# Patient Record
Sex: FEMALE | Employment: OTHER | URBAN - METROPOLITAN AREA
[De-identification: names, ages, dates, MRNs, and addresses within clinical notes are randomized per-mention and may not be internally consistent; named-entity substitution may affect disease eponyms.]

---

## 2017-01-08 ENCOUNTER — HOSPITAL ENCOUNTER (EMERGENCY)
Age: 82
Discharge: HOME | End: 2017-01-08
Attending: EMERGENCY MEDICINE
Payer: MEDICARE

## 2017-01-08 ENCOUNTER — APPOINTMENT (OUTPATIENT)
Dept: GENERAL RADIOLOGY | Age: 82
End: 2017-01-08
Attending: PHYSICIAN ASSISTANT
Payer: MEDICARE

## 2017-01-08 VITALS
TEMPERATURE: 97.9 F | HEART RATE: 64 BPM | SYSTOLIC BLOOD PRESSURE: 178 MMHG | WEIGHT: 113.76 LBS | BODY MASS INDEX: 21.49 KG/M2 | RESPIRATION RATE: 16 BRPM | OXYGEN SATURATION: 100 % | DIASTOLIC BLOOD PRESSURE: 93 MMHG

## 2017-01-08 DIAGNOSIS — M79.672 LEFT FOOT PAIN: Primary | ICD-10-CM

## 2017-01-08 PROCEDURE — 73630 X-RAY EXAM OF FOOT: CPT

## 2017-01-08 PROCEDURE — 77030036687 HC SHOE PSTOP S2SG -A

## 2017-01-08 PROCEDURE — 99283 EMERGENCY DEPT VISIT LOW MDM: CPT

## 2017-01-08 RX ORDER — HYDROCODONE BITARTRATE AND ACETAMINOPHEN 5; 325 MG/1; MG/1
1 TABLET ORAL
Qty: 15 TAB | Refills: 0 | Status: SHIPPED | OUTPATIENT
Start: 2017-01-08 | End: 2017-06-24

## 2017-01-08 NOTE — DISCHARGE INSTRUCTIONS
Foot Pain: Care Instructions  Your Care Instructions  Foot injuries that cause pain and swelling are fairly common. Almost all sports or home repair projects can cause a misstep that ends up as foot pain. Normal wear and tear, especially as you get older, also can cause foot pain. Most minor foot injuries will heal on their own, and home treatment is usually all you need to do. If you have a severe injury, you may need tests and treatment. Follow-up care is a key part of your treatment and safety. Be sure to make and go to all appointments, and call your doctor if you are having problems. Its also a good idea to know your test results and keep a list of the medicines you take. How can you care for yourself at home? · Take pain medicines exactly as directed. ¨ If the doctor gave you a prescription medicine for pain, take it as prescribed. ¨ If you are not taking a prescription pain medicine, ask your doctor if you can take an over-the-counter medicine. · Rest and protect your foot. Take a break from any activity that may cause pain. · Put ice or a cold pack on your foot for 10 to 20 minutes at a time. Put a thin cloth between the ice and your skin. · Prop up the sore foot on a pillow when you ice it or anytime you sit or lie down during the next 3 days. Try to keep it above the level of your heart. This will help reduce swelling. · Your doctor may recommend that you wrap your foot with an elastic bandage. Keep your foot wrapped for as long as your doctor advises. · If your doctor recommends crutches, use them as directed. · Wear roomy footwear. · As soon as pain and swelling end, begin gentle exercises of your foot. Your doctor can tell you which exercises will help. When should you call for help? Call 911 anytime you think you may need emergency care. For example, call if:  · Your foot turns pale, white, blue, or cold.   Call your doctor now or seek immediate medical care if:  · You cannot move or stand on your foot. · Your foot looks twisted or out of its normal position. · Your foot is not stable when you step down. · You have signs of infection, such as:  ¨ Increased pain, swelling, warmth, or redness. ¨ Red streaks leading from the sore area. ¨ Pus draining from a place on your foot. ¨ A fever. · Your foot is numb or tingly. Watch closely for changes in your health, and be sure to contact your doctor if:  · You do not get better as expected. · You have bruises from an injury that last longer than 2 weeks. Where can you learn more? Go to http://graeme-vlad.info/. Enter T764 in the search box to learn more about \"Foot Pain: Care Instructions. \"  Current as of: May 23, 2016  Content Version: 11.1  © 0061-5262 [employer]. Care instructions adapted under license by [employer] (which disclaims liability or warranty for this information). If you have questions about a medical condition or this instruction, always ask your healthcare professional. [place] [address] any warranty or liability for your use of this information. Foot Pain: Care Instructions  Your Care Instructions  Foot injuries that cause pain and swelling are fairly common. Almost all sports or home repair projects can cause a misstep that ends up as foot pain. Normal wear and tear, especially as you get older, also can cause foot pain. Most minor foot injuries will heal on their own, and home treatment is usually all you need to do. If you have a severe injury, you may need tests and treatment. Follow-up care is a key part of your treatment and safety. Be sure to make and go to all appointments, and call your doctor if you are having problems. Its also a good idea to know your test results and keep a list of the medicines you take. How can you care for yourself at home? · Take pain medicines exactly as directed.   ¨ If the doctor gave you a prescription medicine for pain, take it as prescribed. ¨ If you are not taking a prescription pain medicine, ask your doctor if you can take an over-the-counter medicine. · Rest and protect your foot. Take a break from any activity that may cause pain. · Put ice or a cold pack on your foot for 10 to 20 minutes at a time. Put a thin cloth between the ice and your skin. · Prop up the sore foot on a pillow when you ice it or anytime you sit or lie down during the next 3 days. Try to keep it above the level of your heart. This will help reduce swelling. · Your doctor may recommend that you wrap your foot with an elastic bandage. Keep your foot wrapped for as long as your doctor advises. · If your doctor recommends crutches, use them as directed. · Wear roomy footwear. · As soon as pain and swelling end, begin gentle exercises of your foot. Your doctor can tell you which exercises will help. When should you call for help? Call 911 anytime you think you may need emergency care. For example, call if:  · Your foot turns pale, white, blue, or cold. Call your doctor now or seek immediate medical care if:  · You cannot move or stand on your foot. · Your foot looks twisted or out of its normal position. · Your foot is not stable when you step down. · You have signs of infection, such as:  ¨ Increased pain, swelling, warmth, or redness. ¨ Red streaks leading from the sore area. ¨ Pus draining from a place on your foot. ¨ A fever. · Your foot is numb or tingly. Watch closely for changes in your health, and be sure to contact your doctor if:  · You do not get better as expected. · You have bruises from an injury that last longer than 2 weeks. Where can you learn more? Go to http://graeme-vlad.info/. Enter C379 in the search box to learn more about \"Foot Pain: Care Instructions. \"  Current as of: May 23, 2016  Content Version: 11.1  © 5428-4483 [employer].  Care instructions adapted under license by [address] (which disclaims liability or warranty for this information). If you have questions about a medical condition or this instruction, always ask your healthcare professional. [address] any warranty or liability for your use of this information.

## 2017-01-08 NOTE — ED TRIAGE NOTES
When she goes to stand up since the wood fell on the foot she has a pain that goes down entire left leg and then she is able to walk.

## 2017-01-08 NOTE — ED PROVIDER NOTES
HPI Comments: Feroz Richardson is a 80 y.o. female with PMHx significant for mild aortic root dilatation, HTN, CAD, hypothyroidism, arthritis, and GERD who presents ambulatory to the ED c/o constant, 5-10/10 L foot pain x 1 week after \"dropping a piece of wood on her foot. \" Pt reports that her pain is exacerbated with weight bearing actions and movement, and does not state any relieving factors. Pt reports that she is otherwise healthy. Pt specifically denies fever, SOB, nausea, and chest pain. PCP: Faye Portillo MD  Social Hx: - tobacco usage, - EtOH, - Illicit drugs      There are no other complaints, changes or physical findings at this time. This note is prepared by Malvin Ziegler, acting as Scribe for [employer] The history is provided by the patient. No  was used. Past Medical History:   Diagnosis Date    Aortic root dilatation (HCC)      mild    Arthritis      hands    CAD (coronary artery disease) 2001     started seeing Cardiologist 10 years ago for low pulse    Dyslipidemia     GERD (gastroesophageal reflux disease)      does not take anything other than Tums    HTN (hypertension)     Hypothyroidism     Thyroid disease        No past surgical history on file. No family history on file. Social History     Social History    Marital status:      Spouse name: N/A    Number of children: N/A    Years of education: N/A     Occupational History    Not on file. Social History Main Topics    Smoking status: Never Smoker    Smokeless tobacco: Not on file    Alcohol use No    Drug use: No    Sexual activity: Not on file     Other Topics Concern    Not on file     Social History Narrative    No narrative on file         ALLERGIES: Antibiotic [lhwla-dpamt-lceycmv-pramoxine]    Review of Systems   Constitutional: Negative for activity change, appetite change, chills, fever and unexpected weight change.    HENT: Negative for congestion. Eyes: Negative for pain and visual disturbance. Respiratory: Negative for cough and shortness of breath. Cardiovascular: Negative for chest pain. Gastrointestinal: Negative for abdominal pain, diarrhea, nausea and vomiting. Genitourinary: Negative for dysuria. Musculoskeletal: Positive for myalgias (L foot). Negative for back pain. Skin: Negative for rash. Neurological: Negative for headaches. Patient Vitals for the past 12 hrs:   Temp Pulse Resp BP SpO2   01/08/17 1049 - - - (!) 178/93 -   01/08/17 1046 97.9 °F (36.6 °C) 64 16 (!) 200/93 100 %           Physical Exam   Constitutional: She is oriented to person, place, and time. She appears well-developed and well-nourished. No distress. HENT:   Head: Normocephalic and atraumatic. Right Ear: External ear normal.   Left Ear: External ear normal.   Nose: Nose normal.   Mouth/Throat: Oropharynx is clear and moist. No oropharyngeal exudate. Eyes: Conjunctivae and EOM are normal. Pupils are equal, round, and reactive to light. Right eye exhibits no discharge. Left eye exhibits no discharge. No scleral icterus. Neck: Normal range of motion. Neck supple. No JVD present. No tracheal deviation present. Cardiovascular: Normal rate, regular rhythm, normal heart sounds and intact distal pulses. Exam reveals no gallop and no friction rub. No murmur heard. Pulmonary/Chest: Effort normal and breath sounds normal. No respiratory distress. She has no wheezes. She has no rales. She exhibits no tenderness. Abdominal: Soft. Bowel sounds are normal. She exhibits no distension and no mass. There is no tenderness. There is no rebound and no guarding. Musculoskeletal:   L Foot: slight swelling and tenderness on the dorsal aspect, decreased APROM secondary to pain. Lymphadenopathy:     She has no cervical adenopathy. Neurological: She is alert and oriented to person, place, and time. She has normal reflexes.  No cranial nerve deficit. She exhibits normal muscle tone. Coordination normal.   NVI. Skin: Skin is warm and dry. She is not diaphoretic. Psychiatric: She has a normal mood and affect. Her behavior is normal. Judgment and thought content normal.   Nursing note and vitals reviewed. MDM  Number of Diagnoses or Management Options  Diagnosis management comments:     DDx: sprain, strain, fracture       Amount and/or Complexity of Data Reviewed  Tests in the radiology section of CPT®: ordered and reviewed  Review and summarize past medical records: yes  Independent visualization of images, tracings, or specimens: yes    Patient Progress  Patient progress: stable    ED Course       Procedures       PROGRESS NOTE  11:14 AM  Post-op shoe was placed on pt's L foot. This note is prepared by Carolyn Leonardo, acting as Scribe for [employer] LABORATORY TESTS:  No results found for this or any previous visit (from the past 12 hour(s)). IMAGING RESULTS:  XR FOOT LT MIN 3 V   Final Result   EXAM: XR FOOT LT MIN 3 V     INDICATION: pain after dropping wood on foot.     COMPARISON: None.     FINDINGS: Three views of the left foot. The bones are osteopenic. No acute  fracture or dislocation. There is a type II os navicularis. No significant  arthritis. The soft tissues appear unremarkable.     IMPRESSION  IMPRESSION: No acute abnormality. MEDICATIONS GIVEN:  Medications - No data to display    IMPRESSION:  1. Left foot pain        PLAN:  1. Current Discharge Medication List      START taking these medications    Details   HYDROcodone-acetaminophen (NORCO) 5-325 mg per tablet Take 1 Tab by mouth every six (6) hours as needed for Pain. Max Daily Amount: 4 Tabs. Qty: 15 Tab, Refills: 0         CONTINUE these medications which have NOT CHANGED    Details   atorvastatin (LIPITOR) 20 mg tablet Take 1 Tab by mouth daily.   Qty: 90 Tab, Refills: 3    Associated Diagnoses: Dyslipidemia      multivitamins-minerals-lutein (CENTRUM SILVER) Tab Take 1 Tab by mouth. brimonidine (ALPHAGAN) 0.15 % ophthalmic solution Administer 1 Drop to both eyes three (3) times daily. Associated Diagnoses: Essential hypertension, benign      aspirin 81 mg tablet Take  by mouth daily. lisinopril-hydrochlorothiazide (PRINZIDE, ZESTORETIC) 20-25 mg per tablet Take  by mouth daily. levothyroxine (SYNTHROID) 125 mcg tablet Take  by mouth daily (before breakfast). Calcium Carbonate-Vit D3-Min (CALTRATE 600+D PLUS MINERALS) 600 mg calcium- 400 unit Tab Take 2 Tabs by mouth daily. LATANOPROST (XALATAN OP) Apply 1 Drop to eye daily. 2.   Follow-up Information     Follow up With Details Comments Contact Info    Katie Ortega DPM In 2 days As needed [address] [facility]  [phone]          Return to ED if worse     DISCHARGE NOTE  11:28 AM  The patient has been re-evaluated and is ready for discharge. Reviewed available results with patient. Counseled pt on diagnosis and care plan. Pt has expressed understanding, and all questions have been answered. Pt agrees with plan and agrees to F/U as recommended, or return to the ED if their sxs worsen. Discharge instructions have been provided and explained to the pt, along with reasons to return to the ED. Written by Carolyn Leonardo, ED Scribe, as dictated by Jarrod Acuna. This note is prepared by Carolyn Leonardo, acting as Scribe for Jarrod Acuna. DERRICK Devries: The scribe's documentation has been prepared under my direction and personally reviewed by me in its entirety. I confirm that the note above accurately reflects all work, treatment, procedures, and medical decision making performed by me.

## 2017-01-08 NOTE — ED NOTES
Patient identified and read over and explained discharge instructions with time for questions by attending MD/PA. Patient has verbalized understanding of discharge instructions.

## 2017-02-16 ENCOUNTER — APPOINTMENT (OUTPATIENT)
Dept: GENERAL RADIOLOGY | Age: 82
DRG: 919 | End: 2017-02-16
Attending: EMERGENCY MEDICINE
Payer: MEDICARE

## 2017-02-16 ENCOUNTER — HOSPITAL ENCOUNTER (INPATIENT)
Age: 82
LOS: 3 days | Discharge: HOME | DRG: 919 | End: 2017-02-20
Attending: EMERGENCY MEDICINE | Admitting: INTERNAL MEDICINE
Payer: MEDICARE

## 2017-02-16 DIAGNOSIS — D62 ACUTE BLOOD LOSS ANEMIA: Primary | ICD-10-CM

## 2017-02-16 DIAGNOSIS — R42 DIZZINESS: ICD-10-CM

## 2017-02-16 DIAGNOSIS — K92.2 GASTROINTESTINAL HEMORRHAGE, UNSPECIFIED GASTROINTESTINAL HEMORRHAGE TYPE: ICD-10-CM

## 2017-02-16 LAB
ALBUMIN SERPL BCP-MCNC: 3 G/DL (ref 3.5–5)
ALBUMIN/GLOB SERPL: 1.1 {RATIO} (ref 1.1–2.2)
ALP SERPL-CCNC: 64 U/L (ref 45–117)
ALT SERPL-CCNC: 16 U/L (ref 12–78)
ANION GAP BLD CALC-SCNC: 10 MMOL/L (ref 5–15)
APPEARANCE UR: CLEAR
AST SERPL W P-5'-P-CCNC: 14 U/L (ref 15–37)
BACTERIA URNS QL MICRO: ABNORMAL /HPF
BASOPHILS # BLD AUTO: 0 K/UL (ref 0–0.1)
BASOPHILS # BLD: 0 % (ref 0–1)
BILIRUB SERPL-MCNC: 0.3 MG/DL (ref 0.2–1)
BILIRUB UR QL: NEGATIVE
BUN SERPL-MCNC: 28 MG/DL (ref 6–20)
BUN/CREAT SERPL: 43 (ref 12–20)
CALCIUM SERPL-MCNC: 7.8 MG/DL (ref 8.5–10.1)
CHLORIDE SERPL-SCNC: 109 MMOL/L (ref 97–108)
CK SERPL-CCNC: 34 U/L (ref 26–192)
CO2 SERPL-SCNC: 24 MMOL/L (ref 21–32)
COLOR UR: ABNORMAL
CREAT SERPL-MCNC: 0.65 MG/DL (ref 0.55–1.02)
EOSINOPHIL # BLD: 0.1 K/UL (ref 0–0.4)
EOSINOPHIL NFR BLD: 1 % (ref 0–7)
EPITH CASTS URNS QL MICRO: ABNORMAL /LPF
ERYTHROCYTE [DISTWIDTH] IN BLOOD BY AUTOMATED COUNT: 14.4 % (ref 11.5–14.5)
GLOBULIN SER CALC-MCNC: 2.7 G/DL (ref 2–4)
GLUCOSE SERPL-MCNC: 110 MG/DL (ref 65–100)
GLUCOSE UR STRIP.AUTO-MCNC: NEGATIVE MG/DL
HCT VFR BLD AUTO: 27 % (ref 35–47)
HGB BLD-MCNC: 9.2 G/DL (ref 11.5–16)
HGB UR QL STRIP: ABNORMAL
KETONES UR QL STRIP.AUTO: NEGATIVE MG/DL
LEUKOCYTE ESTERASE UR QL STRIP.AUTO: ABNORMAL
LYMPHOCYTES # BLD AUTO: 19 % (ref 12–49)
LYMPHOCYTES # BLD: 1.6 K/UL (ref 0.8–3.5)
MCH RBC QN AUTO: 28.7 PG (ref 26–34)
MCHC RBC AUTO-ENTMCNC: 34.1 G/DL (ref 30–36.5)
MCV RBC AUTO: 84.1 FL (ref 80–99)
MONOCYTES # BLD: 0.6 K/UL (ref 0–1)
MONOCYTES NFR BLD AUTO: 7 % (ref 5–13)
NEUTS SEG # BLD: 6.2 K/UL (ref 1.8–8)
NEUTS SEG NFR BLD AUTO: 73 % (ref 32–75)
NITRITE UR QL STRIP.AUTO: NEGATIVE
PH UR STRIP: 5.5 [PH] (ref 5–8)
PLATELET # BLD AUTO: 123 K/UL (ref 150–400)
POTASSIUM SERPL-SCNC: 3.6 MMOL/L (ref 3.5–5.1)
PROT SERPL-MCNC: 5.7 G/DL (ref 6.4–8.2)
PROT UR STRIP-MCNC: NEGATIVE MG/DL
RBC # BLD AUTO: 3.21 M/UL (ref 3.8–5.2)
RBC #/AREA URNS HPF: ABNORMAL /HPF (ref 0–5)
SODIUM SERPL-SCNC: 143 MMOL/L (ref 136–145)
SP GR UR REFRACTOMETRY: 1.02 (ref 1–1.03)
TROPONIN I SERPL-MCNC: <0.04 NG/ML
UA: UC IF INDICATED,UAUC: ABNORMAL
UROBILINOGEN UR QL STRIP.AUTO: 0.2 EU/DL (ref 0.2–1)
WBC # BLD AUTO: 8.4 K/UL (ref 3.6–11)
WBC URNS QL MICRO: ABNORMAL /HPF (ref 0–4)

## 2017-02-16 PROCEDURE — 86900 BLOOD TYPING SEROLOGIC ABO: CPT | Performed by: EMERGENCY MEDICINE

## 2017-02-16 PROCEDURE — 80053 COMPREHEN METABOLIC PANEL: CPT | Performed by: EMERGENCY MEDICINE

## 2017-02-16 PROCEDURE — 81001 URINALYSIS AUTO W/SCOPE: CPT | Performed by: EMERGENCY MEDICINE

## 2017-02-16 PROCEDURE — 86921 COMPATIBILITY TEST INCUBATE: CPT | Performed by: EMERGENCY MEDICINE

## 2017-02-16 PROCEDURE — 36415 COLL VENOUS BLD VENIPUNCTURE: CPT | Performed by: EMERGENCY MEDICINE

## 2017-02-16 PROCEDURE — 86922 COMPATIBILITY TEST ANTIGLOB: CPT | Performed by: EMERGENCY MEDICINE

## 2017-02-16 PROCEDURE — 71020 XR CHEST PA LAT: CPT

## 2017-02-16 PROCEDURE — 82550 ASSAY OF CK (CPK): CPT | Performed by: EMERGENCY MEDICINE

## 2017-02-16 PROCEDURE — 86920 COMPATIBILITY TEST SPIN: CPT | Performed by: EMERGENCY MEDICINE

## 2017-02-16 PROCEDURE — 96374 THER/PROPH/DIAG INJ IV PUSH: CPT

## 2017-02-16 PROCEDURE — 87086 URINE CULTURE/COLONY COUNT: CPT | Performed by: EMERGENCY MEDICINE

## 2017-02-16 PROCEDURE — 85025 COMPLETE CBC W/AUTO DIFF WBC: CPT | Performed by: EMERGENCY MEDICINE

## 2017-02-16 PROCEDURE — 99285 EMERGENCY DEPT VISIT HI MDM: CPT

## 2017-02-16 PROCEDURE — 93005 ELECTROCARDIOGRAM TRACING: CPT

## 2017-02-16 PROCEDURE — 96361 HYDRATE IV INFUSION ADD-ON: CPT

## 2017-02-16 PROCEDURE — 84484 ASSAY OF TROPONIN QUANT: CPT | Performed by: EMERGENCY MEDICINE

## 2017-02-16 PROCEDURE — 74011250636 HC RX REV CODE- 250/636: Performed by: EMERGENCY MEDICINE

## 2017-02-16 PROCEDURE — 94761 N-INVAS EAR/PLS OXIMETRY MLT: CPT

## 2017-02-16 PROCEDURE — 86870 RBC ANTIBODY IDENTIFICATION: CPT | Performed by: EMERGENCY MEDICINE

## 2017-02-16 RX ORDER — SODIUM CHLORIDE 0.9 % (FLUSH) 0.9 %
5-10 SYRINGE (ML) INJECTION AS NEEDED
Status: DISCONTINUED | OUTPATIENT
Start: 2017-02-16 | End: 2017-02-17 | Stop reason: SDUPTHER

## 2017-02-16 RX ORDER — SODIUM CHLORIDE 0.9 % (FLUSH) 0.9 %
5-10 SYRINGE (ML) INJECTION EVERY 8 HOURS
Status: DISCONTINUED | OUTPATIENT
Start: 2017-02-16 | End: 2017-02-20 | Stop reason: HOSPADM

## 2017-02-16 RX ADMIN — SODIUM CHLORIDE 500 ML: 900 INJECTION, SOLUTION INTRAVENOUS at 20:21

## 2017-02-16 NOTE — ED NOTES
Pt requests to use the restroom. Family offers to assist pt. Pt family given urine hat and specimen cup.

## 2017-02-16 NOTE — IP AVS SNAPSHOT
[address] [facility] 
[phone] Patient: Elia Syed MRN: [id number] :1934 You are allergic to the following Allergen Reactions Antibiotic (Znxme-Segjc-Qlnvfls-Pramoxine) Other (comments) Unknown antibiotic begins with a \"C\" Recent Documentation Height Weight BMI OB Status Smoking Status 1.524 m 51.7 kg 22.26 kg/m2 Postmenopausal Never Smoker Unresulted Labs Order Current Status SAMPLE TO BLOOD BANK In process Emergency Contacts Name Discharge Info Relation Home Work Mobile Gaurang Ricklindsey  Child [2] [phone] Leopoldo Crawley  Daughter [21]   [phone] Melinda Townsend  Sister [23]   [phone] About your hospitalization You were admitted on:  2017 You last received care in the:  [facility] You were discharged on:  2017 Why you were hospitalized Your primary diagnosis was:  Not on File Your diagnoses also included:  Upper Gi Bleeding Providers Seen During Your Hospitalizations Provider Role Specialty Primary office phone Joshua Ham DO Attending Provider Emergency Medicine [phone] Eduarda Kohli MD Attending Provider Hospitalist [phone] David Rand MD Attending Provider Internal Medicine [phone] Your Primary Care Physician (PCP) Primary Care Physician Office Phone Office Fax Gaurang Ricklindsey [phone] Follow-up Information Follow up With Details Comments Contact Info Rocael Jean Baptiste MD Schedule an appointment as soon as possible for a visit in 1 week Patient states she will make a follow up appointment when she arrives home. [facility] [address] 
[phone]  Davonte Mahoney MD  Patient states she will make a follow up appointment when she arrives home. [address] 
[phone] Current Discharge Medication List  
  
START taking these medications Dose & Instructions Dispensing Information Comments Morning Noon Evening Bedtime  
 pantoprazole 40 mg tablet Commonly known as:  PROTONIX Your next dose is: Today, Tomorrow Other:  _________ Dose:  40 mg Take 1 Tab by mouth two (2) times a day. Quantity:  60 Tab Refills:  0  
     
   
   
   
  
 sucralfate 1 gram tablet Commonly known as:  Rainell Ruder Your next dose is: Today, Tomorrow Other:  _________ Dose:  1 g Take 1 Tab by mouth four (4) times daily for 14 days. Quantity:  56 Tab Refills:  0 CONTINUE these medications which have NOT CHANGED Dose & Instructions Dispensing Information Comments Morning Noon Evening Bedtime  
 atorvastatin 20 mg tablet Commonly known as:  LIPITOR Your next dose is: Today, Tomorrow Other:  _________ Dose:  20 mg Take 1 Tab by mouth daily. Quantity:  90 Tab Refills:  3  
     
   
   
   
  
 brimonidine 0.15 % ophthalmic solution Commonly known as:  Mounika Georgia Your next dose is: Today, Tomorrow Other:  _________ Dose:  1 Drop Administer 1 Drop to both eyes three (3) times daily. Refills:  0  
     
   
   
   
  
 CALTRATE 600+D PLUS MINERALS 600 mg calcium- 400 unit Tab Generic drug:  Calcium Carbonate-Vit D3-Min Your next dose is: Today, Tomorrow Other:  _________ Dose:  2 Tab Take 2 Tabs by mouth daily. Refills:  0 CENTRUM SILVER Tab tablet Generic drug:  multivitamins-minerals-lutein Your next dose is: Today, Tomorrow Other:  _________ Dose:  1 Tab Take 1 Tab by mouth. Refills:  0 HYDROcodone-acetaminophen 5-325 mg per tablet Commonly known as:  Barnet Cuff Your next dose is: Today, Tomorrow Other:  _________ Dose:  1 Tab Take 1 Tab by mouth every six (6) hours as needed for Pain. Max Daily Amount: 4 Tabs. Quantity:  15 Tab Refills:  0  
     
   
   
   
  
 lisinopril-hydroCHLOROthiazide 20-25 mg per tablet Commonly known as:  Patel Jadiel Your next dose is: Today, Tomorrow Other:  _________ Take  by mouth daily. Refills:  0  
     
   
   
   
  
 SYNTHROID 125 mcg tablet Generic drug:  levothyroxine Your next dose is: Today, Tomorrow Other:  _________ Take  by mouth daily (before breakfast). Refills:  0  
     
   
   
   
  
 XALATAN OP Your next dose is: Today, Tomorrow Other:  _________ Dose:  1 Drop Apply 1 Drop to eye daily. Refills:  0 ASK your doctor about these medications Dose & Instructions Dispensing Information Comments Morning Noon Evening Bedtime  
 aspirin 81 mg tablet Your next dose is: Today, Tomorrow Other:  _________ Take  by mouth daily. Refills:  0 Where to Get Your Medications Information on where to get these meds will be given to you by the nurse or doctor. ! Ask your nurse or doctor about these medications  
  pantoprazole 40 mg tablet  
 sucralfate 1 gram tablet Discharge Instructions HOSPITALIST DISCHARGE INSTRUCTIONS 
 
NAME: [place] :  1934 MRN:  [id number] Date/Time:  2017 9:23 AM 
 
ADMIT DATE: 2017 DISCHARGE DATE: 2017 Attending Physician: Libby Almanza MD 
 
DISCHARGE DIAGNOSIS: 
Upper GI bleeding - stomach ulcer and bleeding from biopsy site Syncope S/p orthostasis Essential HTN Hypothyroidism Glaucoma Thoracic AA MEDICATIONS: 
See above · It is important that you take the medication exactly as they are prescribed. · Keep your medication in the bottles provided by the pharmacist and keep a list of the medication names, dosages, and times to be taken in your wallet. · Do not take other medications without consulting your doctor. Pain Management: per above medications What to do at [facility] Recommended diet:  Cardiac Diet Recommended activity: Activity as tolerated Follow Up: Follow-up Information Follow up With Details Comments Contact Info Claudia Joiner MD Schedule an appointment as soon as possible for a visit in 1 week  [facility] [address] [place] [address] 
[phone] Carlene Eugene MD   [address] [facility] [address] [place] 
[phone] If you have questions regarding the hospital related prescriptions or hospital related issues please call [facility] at . You can always direct your questions to your primary care doctor if you are unable to reach your hospital physician; your PCP works as an extension of your hospital doctor just like your hospital doctor is an extension of your PCP for your time at [facility]. If you experience any of the following symptoms then please call your primary care physician or return to the emergency room if you cannot get hold of your doctor: 
Fever, chills, nausea, vomiting, diarrhea, change in mentation, falling, bleeding, shortness of breath Additional Instructions: 
 
 
Bring these papers with you to your follow up appointments. The papers will help your doctors be sure to continue the care plan from the hospital. 
 
 
 
 
 
 
Information obtained by : 
I understand that if any problems occur once I am at home I am to contact my physician. I understand and acknowledge receipt of the instructions indicated above. Physician's or R.N.'s Signature                                                                  Date/Time Patient or Representative Signature                                                          Da 
 
Discharge Orders None General Information Please provide this summary of care documentation to your next provider. Introducing [facility]! [facility] introduces Smappo patient portal. Now you can access parts of your medical record, email your doctor's office, and request medication refills online. 1. In your internet browser, go to [email or web address] 2. Click on the First Time User? Click Here link in the Sign In box. You will see the New Member Sign Up page. 3. Enter your Smappo Access Code exactly as it appears below. You will not need to use this code after youve completed the sign-up process. If you do not sign up before the expiration date, you must request a new code. · Smappo Access Code: [email or web address] Expires: 4/8/2017 11:13 AM 
 
4. Enter the last four digits of your Social Security Number (xxxx) and Date of Birth (mm/dd/yyyy) as indicated and click Submit. You will be taken to the next sign-up page. 5. Create a Smappo ID. This will be your Smappo login ID and cannot be changed, so think of one that is secure and easy to remember. 6. Create a Smappo password. You can change your password at any time. 7. Enter your Password Reset Question and Answer. This can be used at a later time if you forget your password. 8. Enter your e-mail address. You will receive e-mail notification when new information is available in [address] 9. Click Sign Up. You can now view and download portions of your medical record. 10. Click the Download Summary menu link to download a portable copy of your medical information. If you have questions, please visit the Frequently Asked Questions section of the [employer]ico website. Remember, [employer] is NOT to be used for urgent needs. For medical emergencies, dial 911. Now available from your iPhone and Android! Patient Signature:  ____________________________________________________________ Date:  ____________________________________________________________  
  
Alfredo Boug Provider Signature:  ____________________________________________________________ Date:  ____________________________________________________________

## 2017-02-16 NOTE — IP AVS SNAPSHOT
[address] [facility] 
[phone] Patient: Feroz Richardson MRN: [id number] :1934 You are allergic to the following Allergen Reactions Antibiotic (Xjcxe-Hycux-Ryhtvjv-Pramoxine) Other (comments) Unknown antibiotic begins with a \"C\" Recent Documentation Height Weight BMI OB Status Smoking Status 1.524 m 51.7 kg 22.26 kg/m2 Postmenopausal Never Smoker Unresulted Labs Order Current Status SAMPLE TO BLOOD BANK In process Emergency Contacts Name Discharge Info Relation Home Work Mobile Rajesh Lu  Child [2] [phone] Landry Alcocer  Daughter [21]   [phone] Silvestre Gitelman  Sister [23]   [phone] About your hospitalization You were admitted on:  2017 You last received care in the:  \[facility] [place] [facility]\"" 2 PROGRESSIVE CARE You were discharged on:  2017 Unit phone number:  [phone] Why you were hospitalized Your primary diagnosis was:  Not on File Your diagnoses also included:  Upper Gi Bleeding Providers Seen During Your Hospitalizations Provider Role Specialty Primary office phone Rachael Howard DO Attending Provider Emergency Medicine [phone] Yudith Garrison MD Attending Provider Hospitalist [phone] Libby Almanza MD Attending Provider Internal Medicine [phone] Your Primary Care Physician (PCP) Primary Care Physician Office Phone Office Fax Rajesh Lu [phone] Follow-up Information Follow up With Details Comments Contact Info Steve Avina MD Schedule an appointment as soon as possible for a visit in 1 week Patient states she will make a follow up appointment when she arrives home. [facility] [address] [place] [address] 
[phone] Wandy Scott MD  Patient states she will make a follow up appointment when she arrives home. [address] 
[phone] Current Discharge Medication List  
  
START taking these medications Dose & Instructions Dispensing Information Comments Morning Noon Evening Bedtime  
 pantoprazole 40 mg tablet Commonly known as:  PROTONIX Your next dose is: Today, Tomorrow Other:  _________ Dose:  40 mg Take 1 Tab by mouth two (2) times a day. Quantity:  60 Tab Refills:  0  
     
   
   
   
  
 sucralfate 1 gram tablet Commonly known as:  Kidder Stalling Your next dose is: Today, Tomorrow Other:  _________ Dose:  1 g Take 1 Tab by mouth four (4) times daily for 14 days. Quantity:  56 Tab Refills:  0 CONTINUE these medications which have NOT CHANGED Dose & Instructions Dispensing Information Comments Morning Noon Evening Bedtime  
 atorvastatin 20 mg tablet Commonly known as:  LIPITOR Your next dose is: Today, Tomorrow Other:  _________ Dose:  20 mg Take 1 Tab by mouth daily. Quantity:  90 Tab Refills:  3  
     
   
   
   
  
 brimonidine 0.15 % ophthalmic solution Commonly known as:  Fiore Sloop Your next dose is: Today, Tomorrow Other:  _________ Dose:  1 Drop Administer 1 Drop to both eyes three (3) times daily. Refills:  0  
     
   
   
   
  
 CALTRATE 600+D PLUS MINERALS 600 mg calcium- 400 unit Tab Generic drug:  Calcium Carbonate-Vit D3-Min Your next dose is: Today, Tomorrow Other:  _________ Dose:  2 Tab Take 2 Tabs by mouth daily. Refills:  0 CENTRUM SILVER Tab tablet Generic drug:  multivitamins-minerals-lutein Your next dose is: Today, Tomorrow Other:  _________ Dose:  1 Tab Take 1 Tab by mouth. Refills:  0 HYDROcodone-acetaminophen 5-325 mg per tablet Commonly known as:  Benetta Pock Your next dose is: Today, Tomorrow Other:  _________ Dose:  1 Tab Take 1 Tab by mouth every six (6) hours as needed for Pain. Max Daily Amount: 4 Tabs. Quantity:  15 Tab Refills:  0  
     
   
   
   
  
 lisinopril-hydroCHLOROthiazide 20-25 mg per tablet Commonly known as:  Donnamarie Parrot Your next dose is: Today, Tomorrow Other:  _________ Take  by mouth daily. Refills:  0  
     
   
   
   
  
 SYNTHROID 125 mcg tablet Generic drug:  levothyroxine Your next dose is: Today, Tomorrow Other:  _________ Take  by mouth daily (before breakfast). Refills:  0  
     
   
   
   
  
 XALATAN OP Your next dose is: Today, Tomorrow Other:  _________ Dose:  1 Drop Apply 1 Drop to eye daily. Refills:  0 ASK your doctor about these medications Dose & Instructions Dispensing Information Comments Morning Noon Evening Bedtime  
 aspirin 81 mg tablet Your next dose is: Today, Tomorrow Other:  _________ Take  by mouth daily. Refills:  0 Where to Get Your Medications Information on where to get these meds will be given to you by the nurse or doctor. ! Ask your nurse or doctor about these medications  
  pantoprazole 40 mg tablet  
 sucralfate 1 gram tablet Discharge Instructions HOSPITALIST DISCHARGE INSTRUCTIONS 
 
NAME: [place] :  1934 MRN:  [id number] Date/Time:  2017 9:23 AM 
 
ADMIT DATE: 2017 DISCHARGE DATE: 2017 Attending Physician: Jluis Bradshaw MD 
 
DISCHARGE DIAGNOSIS: 
Upper GI bleeding - stomach ulcer and bleeding from biopsy site Syncope S/p orthostasis Essential HTN Hypothyroidism Glaucoma Thoracic AA MEDICATIONS: 
See above · It is important that you take the medication exactly as they are prescribed. · Keep your medication in the bottles provided by the pharmacist and keep a list of the medication names, dosages, and times to be taken in your wallet. · Do not take other medications without consulting your doctor. Pain Management: per above medications What to do at [facility] Recommended diet:  Cardiac Diet Recommended activity: Activity as tolerated Follow Up: Follow-up Information Follow up With Details Comments Contact Info Rocael Jean Baptiste MD Schedule an appointment as soon as possible for a visit in 1 week  [facility] [address] [facility] [address] 
[phone] Davonte Mahoney MD   [address] [address] [facility] [address] [facility] [address] 
[phone] If you have questions regarding the hospital related prescriptions or hospital related issues please call [facility] at . You can always direct your questions to your primary care doctor if you are unable to reach your hospital physician; your PCP works as an extension of your hospital doctor just like your hospital doctor is an extension of your PCP for your time at [facility]. If you experience any of the following symptoms then please call your primary care physician or return to the emergency room if you cannot get hold of your doctor: 
Fever, chills, nausea, vomiting, diarrhea, change in mentation, falling, bleeding, shortness of breath Additional Instructions: 
 
 
Bring these papers with you to your follow up appointments. The papers will help your doctors be sure to continue the care plan from the hospital. 
 
 
 
 
 
 
Information obtained by : 
I understand that if any problems occur once I am at home I am to contact my physician. I understand and acknowledge receipt of the instructions indicated above. Physician's or R.N.'s Signature                                                                  Date/Time Patient or Representative Signature                                                          Da 
 
Discharge Orders None [facility] Announcement We are excited to announce that we are making your provider's discharge notes available to you in [employer]. You will see these notes when they are completed and signed by the physician that discharged you from your recent hospital stay. If you have any questions or concerns about any information you see in [employer], please call the Health Information Department where you were seen or reach out to your Primary Care Provider for more information about your plan of care. Introducing [facility] [place] [facility] SERVICES! Eliza Murguia introduces [employer] patient portal. Now you can access parts of your medical record, email your doctor's office, and request medication refills online. 1. In your internet browser, go to [email or web address] 2. Click on the First Time User? Click Here link in the Sign In box. You will see the New Member Sign Up page. 3. Enter your [employer] Access Code exactly as it appears below. You will not need to use this code after youve completed the sign-up process. If you do not sign up before the expiration date, you must request a new code. · [employer] Access Code: [email or web address] Expires: 4/8/2017 11:13 AM 
 
4. Enter the last four digits of your Social Security Number (xxxx) and Date of Birth (mm/dd/yyyy) as indicated and click Submit. You will be taken to the next sign-up page. 5. Create a FreshPayt ID.  This will be your [employer] login ID and cannot be changed, so think of one that is secure and easy to remember. 6. Create a [employer] password. You can change your password at any time. 7. Enter your Password Reset Question and Answer. This can be used at a later time if you forget your password. 8. Enter your e-mail address. You will receive e-mail notification when new information is available in [address] 9. Click Sign Up. You can now view and download portions of your medical record. 10. Click the Download Summary menu link to download a portable copy of your medical information. If you have questions, please visit the Frequently Asked Questions section of the [employer] website. Remember, [employer] is NOT to be used for urgent needs. For medical emergencies, dial 911. Now available from your iPhone and Android! General Information Please provide this summary of care documentation to your next provider. Patient Signature:  ____________________________________________________________ Date:  ____________________________________________________________  
  
Tamy Charter Provider Signature:  ____________________________________________________________ Date:  ____________________________________________________________

## 2017-02-17 ENCOUNTER — ANESTHESIA EVENT (OUTPATIENT)
Dept: ENDOSCOPY | Age: 82
DRG: 919 | End: 2017-02-17
Payer: MEDICARE

## 2017-02-17 ENCOUNTER — ANESTHESIA (OUTPATIENT)
Dept: ENDOSCOPY | Age: 82
DRG: 919 | End: 2017-02-17
Payer: MEDICARE

## 2017-02-17 PROBLEM — K92.2 UPPER GI BLEEDING: Status: ACTIVE | Noted: 2017-02-17

## 2017-02-17 LAB
ANION GAP BLD CALC-SCNC: 9 MMOL/L (ref 5–15)
ATRIAL RATE: 88 BPM
BASOPHILS # BLD AUTO: 0 K/UL (ref 0–0.1)
BASOPHILS # BLD: 0 % (ref 0–1)
BUN SERPL-MCNC: 22 MG/DL (ref 6–20)
BUN/CREAT SERPL: 42 (ref 12–20)
CALCIUM SERPL-MCNC: 7.8 MG/DL (ref 8.5–10.1)
CALCULATED P AXIS, ECG09: 16 DEGREES
CALCULATED R AXIS, ECG10: -58 DEGREES
CALCULATED T AXIS, ECG11: 163 DEGREES
CHLORIDE SERPL-SCNC: 111 MMOL/L (ref 97–108)
CO2 SERPL-SCNC: 25 MMOL/L (ref 21–32)
CREAT SERPL-MCNC: 0.53 MG/DL (ref 0.55–1.02)
DIAGNOSIS, 93000: NORMAL
EOSINOPHIL # BLD: 0.1 K/UL (ref 0–0.4)
EOSINOPHIL NFR BLD: 2 % (ref 0–7)
ERYTHROCYTE [DISTWIDTH] IN BLOOD BY AUTOMATED COUNT: 14.7 % (ref 11.5–14.5)
GLUCOSE SERPL-MCNC: 84 MG/DL (ref 65–100)
HCT VFR BLD AUTO: 21 % (ref 35–47)
HCT VFR BLD AUTO: 22.8 % (ref 35–47)
HGB BLD-MCNC: 7.2 G/DL (ref 11.5–16)
HGB BLD-MCNC: 7.8 G/DL (ref 11.5–16)
INR PPP: 1.1 (ref 0.9–1.1)
LYMPHOCYTES # BLD AUTO: 27 % (ref 12–49)
LYMPHOCYTES # BLD: 1.8 K/UL (ref 0.8–3.5)
MCH RBC QN AUTO: 28.7 PG (ref 26–34)
MCHC RBC AUTO-ENTMCNC: 34.2 G/DL (ref 30–36.5)
MCV RBC AUTO: 83.8 FL (ref 80–99)
MONOCYTES # BLD: 0.5 K/UL (ref 0–1)
MONOCYTES NFR BLD AUTO: 7 % (ref 5–13)
NEUTS SEG # BLD: 4.3 K/UL (ref 1.8–8)
NEUTS SEG NFR BLD AUTO: 64 % (ref 32–75)
P-R INTERVAL, ECG05: 150 MS
PLATELET # BLD AUTO: 107 K/UL (ref 150–400)
POTASSIUM SERPL-SCNC: 3.6 MMOL/L (ref 3.5–5.1)
PROTHROMBIN TIME: 11.5 SEC (ref 9–11.1)
Q-T INTERVAL, ECG07: 340 MS
QRS DURATION, ECG06: 82 MS
QTC CALCULATION (BEZET), ECG08: 411 MS
RBC # BLD AUTO: 2.72 M/UL (ref 3.8–5.2)
SODIUM SERPL-SCNC: 145 MMOL/L (ref 136–145)
TROPONIN I SERPL-MCNC: <0.04 NG/ML
VENTRICULAR RATE, ECG03: 88 BPM
WBC # BLD AUTO: 6.7 K/UL (ref 3.6–11)

## 2017-02-17 PROCEDURE — 74011000250 HC RX REV CODE- 250: Performed by: INTERNAL MEDICINE

## 2017-02-17 PROCEDURE — 77030029131 HC ADMN ST IV BLD N DEHP ICUM -B

## 2017-02-17 PROCEDURE — 85610 PROTHROMBIN TIME: CPT | Performed by: INTERNAL MEDICINE

## 2017-02-17 PROCEDURE — L3710 EO ELAS W/METAL JNTS PRE OTS: HCPCS

## 2017-02-17 PROCEDURE — 77030010936 HC CLP LIG BSC -C: Performed by: INTERNAL MEDICINE

## 2017-02-17 PROCEDURE — 76040000007: Performed by: INTERNAL MEDICINE

## 2017-02-17 PROCEDURE — 85018 HEMOGLOBIN: CPT | Performed by: INTERNAL MEDICINE

## 2017-02-17 PROCEDURE — 36415 COLL VENOUS BLD VENIPUNCTURE: CPT | Performed by: INTERNAL MEDICINE

## 2017-02-17 PROCEDURE — C9113 INJ PANTOPRAZOLE SODIUM, VIA: HCPCS | Performed by: EMERGENCY MEDICINE

## 2017-02-17 PROCEDURE — P9016 RBC LEUKOCYTES REDUCED: HCPCS | Performed by: EMERGENCY MEDICINE

## 2017-02-17 PROCEDURE — 84484 ASSAY OF TROPONIN QUANT: CPT | Performed by: INTERNAL MEDICINE

## 2017-02-17 PROCEDURE — 77030003657 HC NDL SCLER BSC -B: Performed by: INTERNAL MEDICINE

## 2017-02-17 PROCEDURE — 74011250636 HC RX REV CODE- 250/636

## 2017-02-17 PROCEDURE — 77030014179 HC APPL CLP RESOL BSC -C: Performed by: INTERNAL MEDICINE

## 2017-02-17 PROCEDURE — 74011250637 HC RX REV CODE- 250/637: Performed by: INTERNAL MEDICINE

## 2017-02-17 PROCEDURE — 85025 COMPLETE CBC W/AUTO DIFF WBC: CPT | Performed by: INTERNAL MEDICINE

## 2017-02-17 PROCEDURE — 80048 BASIC METABOLIC PNL TOTAL CA: CPT | Performed by: INTERNAL MEDICINE

## 2017-02-17 PROCEDURE — 74011250636 HC RX REV CODE- 250/636: Performed by: INTERNAL MEDICINE

## 2017-02-17 PROCEDURE — 74011250636 HC RX REV CODE- 250/636: Performed by: ANESTHESIOLOGY

## 2017-02-17 PROCEDURE — 74011250636 HC RX REV CODE- 250/636: Performed by: EMERGENCY MEDICINE

## 2017-02-17 PROCEDURE — 76060000032 HC ANESTHESIA 0.5 TO 1 HR: Performed by: INTERNAL MEDICINE

## 2017-02-17 PROCEDURE — 0W3P8ZZ CONTROL BLEEDING IN GASTROINTESTINAL TRACT, VIA NATURAL OR ARTIFICIAL OPENING ENDOSCOPIC: ICD-10-PCS | Performed by: INTERNAL MEDICINE

## 2017-02-17 PROCEDURE — 30233N1 TRANSFUSION OF NONAUTOLOGOUS RED BLOOD CELLS INTO PERIPHERAL VEIN, PERCUTANEOUS APPROACH: ICD-10-PCS | Performed by: INTERNAL MEDICINE

## 2017-02-17 PROCEDURE — 74011000250 HC RX REV CODE- 250

## 2017-02-17 PROCEDURE — 36430 TRANSFUSION BLD/BLD COMPNT: CPT

## 2017-02-17 PROCEDURE — 65660000000 HC RM CCU STEPDOWN

## 2017-02-17 PROCEDURE — 74011000250 HC RX REV CODE- 250: Performed by: EMERGENCY MEDICINE

## 2017-02-17 RX ORDER — SODIUM CHLORIDE 9 MG/ML
75 INJECTION, SOLUTION INTRAVENOUS CONTINUOUS
Status: DISPENSED | OUTPATIENT
Start: 2017-02-17 | End: 2017-02-17

## 2017-02-17 RX ORDER — ONDANSETRON 2 MG/ML
4 INJECTION INTRAMUSCULAR; INTRAVENOUS
Status: DISCONTINUED | OUTPATIENT
Start: 2017-02-17 | End: 2017-02-20 | Stop reason: HOSPADM

## 2017-02-17 RX ORDER — SODIUM CHLORIDE 0.9 % (FLUSH) 0.9 %
5-10 SYRINGE (ML) INJECTION AS NEEDED
Status: ACTIVE | OUTPATIENT
Start: 2017-02-17 | End: 2017-02-17

## 2017-02-17 RX ORDER — FLUMAZENIL 0.1 MG/ML
0.2 INJECTION INTRAVENOUS
Status: DISCONTINUED | OUTPATIENT
Start: 2017-02-17 | End: 2017-02-17 | Stop reason: SDUPTHER

## 2017-02-17 RX ORDER — SODIUM CHLORIDE 9 MG/ML
75 INJECTION, SOLUTION INTRAVENOUS CONTINUOUS
Status: DISCONTINUED | OUTPATIENT
Start: 2017-02-17 | End: 2017-02-19

## 2017-02-17 RX ORDER — DEXTROMETHORPHAN/PSEUDOEPHED 2.5-7.5/.8
1.2 DROPS ORAL
Status: DISCONTINUED | OUTPATIENT
Start: 2017-02-17 | End: 2017-02-20 | Stop reason: HOSPADM

## 2017-02-17 RX ORDER — SUCRALFATE 1 G/10ML
1 SUSPENSION ORAL 4 TIMES DAILY
Status: DISCONTINUED | OUTPATIENT
Start: 2017-02-17 | End: 2017-02-17

## 2017-02-17 RX ORDER — NALOXONE HYDROCHLORIDE 0.4 MG/ML
0.4 INJECTION, SOLUTION INTRAMUSCULAR; INTRAVENOUS; SUBCUTANEOUS AS NEEDED
Status: DISCONTINUED | OUTPATIENT
Start: 2017-02-17 | End: 2017-02-20 | Stop reason: HOSPADM

## 2017-02-17 RX ORDER — FLUMAZENIL 0.1 MG/ML
0.2 INJECTION INTRAVENOUS
Status: DISCONTINUED | OUTPATIENT
Start: 2017-02-17 | End: 2017-02-17

## 2017-02-17 RX ORDER — BRIMONIDINE TARTRATE 2 MG/ML
1 SOLUTION/ DROPS OPHTHALMIC 3 TIMES DAILY
Status: DISCONTINUED | OUTPATIENT
Start: 2017-02-17 | End: 2017-02-17

## 2017-02-17 RX ORDER — ATROPINE SULFATE 0.1 MG/ML
0.5 INJECTION INTRAVENOUS
Status: DISCONTINUED | OUTPATIENT
Start: 2017-02-17 | End: 2017-02-17 | Stop reason: SDUPTHER

## 2017-02-17 RX ORDER — PROPOFOL 10 MG/ML
INJECTION, EMULSION INTRAVENOUS AS NEEDED
Status: DISCONTINUED | OUTPATIENT
Start: 2017-02-17 | End: 2017-02-17 | Stop reason: HOSPADM

## 2017-02-17 RX ORDER — MIDAZOLAM HYDROCHLORIDE 1 MG/ML
.25-5 INJECTION, SOLUTION INTRAMUSCULAR; INTRAVENOUS
Status: ACTIVE | OUTPATIENT
Start: 2017-02-17 | End: 2017-02-17

## 2017-02-17 RX ORDER — LATANOPROST 50 UG/ML
1 SOLUTION/ DROPS OPHTHALMIC DAILY
Status: DISCONTINUED | OUTPATIENT
Start: 2017-02-17 | End: 2017-02-19

## 2017-02-17 RX ORDER — EPINEPHRINE 0.1 MG/ML
1 INJECTION INTRACARDIAC; INTRAVENOUS
Status: ACTIVE | OUTPATIENT
Start: 2017-02-17 | End: 2017-02-17

## 2017-02-17 RX ORDER — LEVOTHYROXINE SODIUM 125 UG/1
125 TABLET ORAL
Status: DISCONTINUED | OUTPATIENT
Start: 2017-02-17 | End: 2017-02-20 | Stop reason: HOSPADM

## 2017-02-17 RX ORDER — ACETAMINOPHEN 325 MG/1
650 TABLET ORAL
Status: DISCONTINUED | OUTPATIENT
Start: 2017-02-17 | End: 2017-02-20 | Stop reason: HOSPADM

## 2017-02-17 RX ORDER — SODIUM CHLORIDE 9 MG/ML
250 INJECTION, SOLUTION INTRAVENOUS AS NEEDED
Status: DISCONTINUED | OUTPATIENT
Start: 2017-02-17 | End: 2017-02-19 | Stop reason: ALTCHOICE

## 2017-02-17 RX ORDER — SODIUM CHLORIDE 9 MG/ML
INJECTION, SOLUTION INTRAVENOUS
Status: DISCONTINUED | OUTPATIENT
Start: 2017-02-17 | End: 2017-02-17 | Stop reason: HOSPADM

## 2017-02-17 RX ORDER — SODIUM CHLORIDE 0.9 % (FLUSH) 0.9 %
5-10 SYRINGE (ML) INJECTION EVERY 8 HOURS
Status: COMPLETED | OUTPATIENT
Start: 2017-02-17 | End: 2017-02-17

## 2017-02-17 RX ORDER — ONDANSETRON 2 MG/ML
INJECTION INTRAMUSCULAR; INTRAVENOUS
Status: DISPENSED
Start: 2017-02-17 | End: 2017-02-18

## 2017-02-17 RX ORDER — FENTANYL CITRATE 50 UG/ML
25 INJECTION, SOLUTION INTRAMUSCULAR; INTRAVENOUS
Status: ACTIVE | OUTPATIENT
Start: 2017-02-17 | End: 2017-02-17

## 2017-02-17 RX ORDER — SODIUM CHLORIDE 0.9 % (FLUSH) 0.9 %
5-10 SYRINGE (ML) INJECTION AS NEEDED
Status: DISCONTINUED | OUTPATIENT
Start: 2017-02-17 | End: 2017-02-20 | Stop reason: HOSPADM

## 2017-02-17 RX ORDER — ATORVASTATIN CALCIUM 20 MG/1
20 TABLET, FILM COATED ORAL
Status: DISCONTINUED | OUTPATIENT
Start: 2017-02-17 | End: 2017-02-20 | Stop reason: HOSPADM

## 2017-02-17 RX ORDER — ATROPINE SULFATE 0.1 MG/ML
0.5 INJECTION INTRAVENOUS
Status: ACTIVE | OUTPATIENT
Start: 2017-02-17 | End: 2017-02-17

## 2017-02-17 RX ORDER — NALOXONE HYDROCHLORIDE 0.4 MG/ML
0.4 INJECTION, SOLUTION INTRAMUSCULAR; INTRAVENOUS; SUBCUTANEOUS
Status: DISCONTINUED | OUTPATIENT
Start: 2017-02-17 | End: 2017-02-17 | Stop reason: SDUPTHER

## 2017-02-17 RX ORDER — EPINEPHRINE 0.1 MG/ML
1 INJECTION INTRACARDIAC; INTRAVENOUS
Status: DISCONTINUED | OUTPATIENT
Start: 2017-02-17 | End: 2017-02-17 | Stop reason: SDUPTHER

## 2017-02-17 RX ORDER — LIDOCAINE HYDROCHLORIDE 20 MG/ML
INJECTION, SOLUTION EPIDURAL; INFILTRATION; INTRACAUDAL; PERINEURAL AS NEEDED
Status: DISCONTINUED | OUTPATIENT
Start: 2017-02-17 | End: 2017-02-17 | Stop reason: HOSPADM

## 2017-02-17 RX ORDER — BRIMONIDINE TARTRATE 2 MG/ML
1 SOLUTION/ DROPS OPHTHALMIC 3 TIMES DAILY
Status: DISCONTINUED | OUTPATIENT
Start: 2017-02-17 | End: 2017-02-20 | Stop reason: HOSPADM

## 2017-02-17 RX ORDER — DEXTROMETHORPHAN/PSEUDOEPHED 2.5-7.5/.8
1.2 DROPS ORAL
Status: DISCONTINUED | OUTPATIENT
Start: 2017-02-17 | End: 2017-02-17 | Stop reason: SDUPTHER

## 2017-02-17 RX ORDER — SUCRALFATE 1 G/1
1 TABLET ORAL 4 TIMES DAILY
Status: DISCONTINUED | OUTPATIENT
Start: 2017-02-17 | End: 2017-02-20 | Stop reason: HOSPADM

## 2017-02-17 RX ORDER — SODIUM CHLORIDE 0.9 % (FLUSH) 0.9 %
5-10 SYRINGE (ML) INJECTION EVERY 8 HOURS
Status: DISCONTINUED | OUTPATIENT
Start: 2017-02-17 | End: 2017-02-18

## 2017-02-17 RX ORDER — FACIAL-BODY WIPES
10 EACH TOPICAL DAILY PRN
Status: DISCONTINUED | OUTPATIENT
Start: 2017-02-17 | End: 2017-02-20 | Stop reason: HOSPADM

## 2017-02-17 RX ORDER — SODIUM CHLORIDE 0.9 % (FLUSH) 0.9 %
5-10 SYRINGE (ML) INJECTION EVERY 8 HOURS
Status: DISCONTINUED | OUTPATIENT
Start: 2017-02-17 | End: 2017-02-17 | Stop reason: SDUPTHER

## 2017-02-17 RX ORDER — NALOXONE HYDROCHLORIDE 0.4 MG/ML
0.4 INJECTION, SOLUTION INTRAMUSCULAR; INTRAVENOUS; SUBCUTANEOUS
Status: ACTIVE | OUTPATIENT
Start: 2017-02-17 | End: 2017-02-17

## 2017-02-17 RX ADMIN — SODIUM CHLORIDE 40 MG: 9 INJECTION INTRAMUSCULAR; INTRAVENOUS; SUBCUTANEOUS at 13:11

## 2017-02-17 RX ADMIN — BRIMONIDINE TARTRATE 1 DROP: 2 SOLUTION/ DROPS OPHTHALMIC at 18:28

## 2017-02-17 RX ADMIN — PROPOFOL 70 MG: 10 INJECTION, EMULSION INTRAVENOUS at 15:28

## 2017-02-17 RX ADMIN — SUCRALFATE 1 G: 1 TABLET ORAL at 22:20

## 2017-02-17 RX ADMIN — SUCRALFATE 1 G: 1 TABLET ORAL at 18:28

## 2017-02-17 RX ADMIN — ONDANSETRON HYDROCHLORIDE 4 MG: 2 INJECTION, SOLUTION INTRAMUSCULAR; INTRAVENOUS at 15:47

## 2017-02-17 RX ADMIN — Medication 10 ML: at 18:50

## 2017-02-17 RX ADMIN — EPINEPHRINE 1 MG: 0.1 INJECTION, SOLUTION ENDOTRACHEAL; INTRACARDIAC; INTRAVENOUS at 15:33

## 2017-02-17 RX ADMIN — ATORVASTATIN CALCIUM 20 MG: 20 TABLET, FILM COATED ORAL at 02:58

## 2017-02-17 RX ADMIN — LIDOCAINE HYDROCHLORIDE 50 MG: 20 INJECTION, SOLUTION EPIDURAL; INFILTRATION; INTRACAUDAL; PERINEURAL at 15:18

## 2017-02-17 RX ADMIN — SODIUM CHLORIDE 100 ML/HR: 900 INJECTION, SOLUTION INTRAVENOUS at 01:56

## 2017-02-17 RX ADMIN — SODIUM CHLORIDE 80 MG: 9 INJECTION INTRAMUSCULAR; INTRAVENOUS; SUBCUTANEOUS at 00:15

## 2017-02-17 RX ADMIN — ATORVASTATIN CALCIUM 20 MG: 20 TABLET, FILM COATED ORAL at 22:20

## 2017-02-17 RX ADMIN — BRIMONIDINE TARTRATE 1 DROP: 2 SOLUTION/ DROPS OPHTHALMIC at 22:19

## 2017-02-17 RX ADMIN — Medication 10 ML: at 17:20

## 2017-02-17 RX ADMIN — Medication 10 ML: at 22:22

## 2017-02-17 RX ADMIN — SODIUM CHLORIDE 40 MG: 9 INJECTION INTRAMUSCULAR; INTRAVENOUS; SUBCUTANEOUS at 22:21

## 2017-02-17 RX ADMIN — SODIUM CHLORIDE: 9 INJECTION, SOLUTION INTRAVENOUS at 15:17

## 2017-02-17 RX ADMIN — Medication 10 ML: at 16:50

## 2017-02-17 RX ADMIN — SODIUM CHLORIDE 1000 ML: 900 INJECTION, SOLUTION INTRAVENOUS at 05:33

## 2017-02-17 RX ADMIN — SODIUM CHLORIDE 100 ML/HR: 900 INJECTION, SOLUTION INTRAVENOUS at 14:56

## 2017-02-17 NOTE — H&P
Luiz Rashid, [address]   HISTORY AND PHYSICAL       Name:  Letta Claude   MR#:  [id number]   :  1934   Account #:  [de-identified]        Date of Adm:  2017       MD LEANDER Alejandra / Jose Galeano   D:  2017   02:49   T:  2017   03:20   Job #:  [id number]

## 2017-02-17 NOTE — ED NOTES
Pt ambulated in ED hallway at this time per MD request. Pt tolerated ambulation and denies light headedness or dizziness with ambulation. Pt reports she is hungry at this time and it is starting to make her feel sick. Pt provided ginger ale and crackers approved by EUGENE FELIX at this time.

## 2017-02-17 NOTE — ED NOTES
Bedside shift change report given to Zeenat Kraft (oncoming nurse) by Sotero Martin RN (offgoing nurse).  Report included the following information SBAR, Kardex, ED Summary and MAR. '

## 2017-02-17 NOTE — ED NOTES
Assumed care, pt resting in bed, call bell within reach, family at bedside; pt is awaiting admission, updated on plan of care

## 2017-02-17 NOTE — H&P
Hospitalist Admission Note    NAME:  [place]   :   1934   MRN:   [id number]     Date of admit:  2017    PCP: Serina Logan MD    Assessment/Plan:      Upper GI bleeding (2017) POA suspect bleeding from a biopsy site  Recent EGD by Dr Michael Mckinney on 2017, normal by her report   2 biopsies done  Now with melena, HgB 9.2(no recent baseline), BUN 28  Cruz melena on rectal exam in ED, no further BM since arriving in the ED  Admit  NPO till seen by GI  IV PPI  IVF  Serial HgBs, transfuse as needed  Consult GI in AM, likely will need repeat EGD vs arteriography +/- embolization  Hold ASA and BP meds    Syncope POA suspect due to hypovolemia  Orthostasis POA in ED  Suspect due to hypovolemia with the bleeding, started after the EGD  IVF  Serial Hgbs  Tele  Hold BP meds    Essential HTN POA now with borderline hypotension 104/54 in ED  Hold lisinopril-HCTZ  PRN hydralazine    Hypothyroidism POA  Continue replacement    Thoracic aortic aneurysm POA     Glaucoma POA continue eye drops    DVT prophylaxis: SCDs    Code status: Full code    Next of Kin:     Subjective:   CHIEF COMPLAINT:  I passed out today and had a black stool    HISTORY OF PRESENT ILLNESS: An 66-year-old [place] American   female who recently has been having problems with upper abdominal   pain. Her sister in the room notes she has also had a very poor   appetite. She may have lost several pounds over the past few months. She underwent an endoscopy without Dr. Orly Krause on 2017, which   per patient report was normal. The patient reports 2 biopsies were   done and then she was discharged to home. She initially did fine then   the next day she began to feel \"dizzy and sick. \" She began to feel very   lightheaded when she would stand. At times she felt like she had   \"motion sickness\" when she tried to stand up. When she laid down, she   felt fine.  Her bowel movements were initially normal, then said on the   next day on Thursday she had a syncopal episode. She felt the urge to   move her bowels. She was partially bent over trying to lift up to get the   toilet seat down, when the next thing she remembers she woke up on   the floor. She had lost control of her bowels while she was out and she   had a very large black bowel movement. She continued to feel   lightheaded and dizzy so she decided to come to the emergency room. She did have some mild periumbilical abdominal pain, cramping that is   now resolved. She denied any nausea, vomiting or hematemesis. No   bright red blood per rectum, no chest pain, new shortness of breath,   headaches. She has felt diffusely weak and tired and has felt cold in   the emergency room. In the emergency room, she was borderline hypotensive with blood   pressures as low as 100/50. She has not had any further bowel   movements. Rectal exam by the emergency room physician revealed   black melenic stool that was heme-positive. Her hemoglobin came   back at 9.2. We do not have any baseline hemoglobins in the past 5   years. We were called to admit the patient. Her BUN was increased to 28. Past Medical History   Diagnosis Date    Aortic root dilatation (HCC)      mild    Arthritis      hands    CAD (coronary artery disease)      started seeing Cardiologist 10 years ago for low pulse    Dyslipidemia     GERD (gastroesophageal reflux disease)      does not take anything other than Tums    HTN (hypertension)     Hypothyroidism     Thyroid disease         History reviewed. No pertinent past surgical history. Social History:  Social History   Substance Use Topics    Smoking status: Never Smoker    Smokeless tobacco: Not on file    Alcohol use No        FAMILY HISTORY:  9 children healthy  Father  of TB approx 72 Y. O.  Mother  of CHF  Sister with HTN    Allergies   Allergen Reactions    Antibiotic [Yqype-Mwzuy-Tvybwxb-Pramoxine] Other (comments)     Unknown antibiotic begins with a \"C\"        Prior to Admission medications    Medication Sig Start Date End Date Taking? Authorizing Provider   HYDROcodone-acetaminophen (NORCO) 5-325 mg per tablet Take 1 Tab by mouth every six (6) hours as needed for Pain. Max Daily Amount: 4 Tabs. 1/8/17   LIVIER Trejo   atorvastatin (LIPITOR) 20 mg tablet Take 1 Tab by mouth daily. 10/30/13   Stefano Severance, MD   Calcium Carbonate-Vit D3-Min (CALTRATE 600+D PLUS MINERALS) 600 mg calcium- 400 unit Tab Take 2 Tabs by mouth daily. Historical Provider   multivitamins-minerals-lutein (CENTRUM SILVER) Tab Take 1 Tab by mouth. Historical Provider   brimonidine (ALPHAGAN) 0.15 % ophthalmic solution Administer 1 Drop to both eyes three (3) times daily. Historical Provider   aspirin 81 mg tablet Take  by mouth daily. 3/11/11   Historical Provider   lisinopril-hydrochlorothiazide (PRINZIDE, ZESTORETIC) 20-25 mg per tablet Take  by mouth daily. Historical Provider   levothyroxine (SYNTHROID) 125 mcg tablet Take  by mouth daily (before breakfast). Historical Provider   LATANOPROST (XALATAN OP) Apply 1 Drop to eye daily.  3/11/11   Historical Provider       REVIEW OF SYSTEMS:  Bold equals positive    Yes Total of 12 systems reviewed as follows:  Constitutional: fever Chills  weight loss 4 lbs    Feels cold    lightheaded upon standing  Eyes:   Diplopia visual changes  eye pain  ENT:   coryza  Sore throat Dysphagia  Respiratory:  cough or sputum  Hemoptysis Chronic AM dyspnea  Cards:   chest pain  orthopnea LE edema  GI:   Nausea/vomiting Diarrhea abdominal pain    melena  BRBPR  Genitourinary:  frequency  dysuria hematuria  Integument:  Rash Ulcers  Nodules  Hematologic:  Easy bruising, negative gum/nose bleeding  Endocrine: Polyuria/poldipsia heat/cold intolerance  Musculoskel: Myalgias Joint pain/swelling/redness Gout  Neurological:  Headaches  focal motor or sensory changes    Objective:   VITALS:    Patient Vitals for the past 24 hrs:   Temp Pulse Resp BP SpO2   17 0155 - 73 - - 100 %   17 -  - 103/86 -   17 - 74 - 104/52 -   17 - 70 - 107/63 -   17 1838 97.9 °F (36.6 °C) 85 18 110/71 99 %     Temp (24hrs), Av.9 °F (36.6 °C), Min:97.9 °F (36.6 °C), Max:97.9 °F (36.6 °C)      O2 Device: Room air    PHYSICAL EXAM:   General:    Alert, cooperative in no distress     HEENT: Normocephalic, atraumatic    PERRL, Sclera no icterus    Nasal mucosa without masses or discharge  Hearing intact to voice    Oropharynx without erythema or exudate  No thrush  Pale MM  Neck:  No meningismus, trachea midline, no carotid bruits     Thyroid not enlarged, no nodules or tenderness  Lungs:   Clear to auscultation bilaterally. No wheezing or rales    No accessory muscle use or retractions. Heart:   Regular rate and rhythm,  no murmur or gallop. No LE edema  Abdomen:   Soft, non-tender. Not distended. Bowel sounds normal.     No masses, No Hepatosplenomegaly, No Rebound or guarding  Lymph nodes: No cervical or inguinal RICK  Musculoskeletal:  No Joint swelling, erythema, warmth.  No Cyanosis or clubbing  Skin:      No rashes      Not Jaundiced   No nodules or thickening    Normal capillary refill  Neurologic: Alert and oriented X 3, follows commands     Cranial nerves 2 to 12 intact    Symmetric motor strength bilaterally         LAB DATA REVIEWED:    Recent Results (from the past 24 hour(s))   EKG, 12 LEAD, INITIAL    Collection Time: 17  6:32 PM   Result Value Ref Range    Ventricular Rate 88 BPM    Atrial Rate 88 BPM    P-R Interval 150 ms    QRS Duration 82 ms    Q-T Interval 340 ms    QTC Calculation (Bezet) 411 ms    Calculated P Axis 16 degrees    Calculated R Axis -58 degrees    Calculated T Axis 163 degrees    Diagnosis       Sinus rhythm with occasional premature ventricular complexes  Left anterior fascicular block  Cannot rule out Anterior infarct (cited on or before 16-FEB-2017)  Abnormal ECG  When compared with ECG of 05-JUL-2012 10:53,  Significant changes have occurred     CBC WITH AUTOMATED DIFF    Collection Time: 02/16/17  7:42 PM   Result Value Ref Range    WBC 8.4 3.6 - 11.0 K/uL    RBC 3.21 (L) 3.80 - 5.20 M/uL    HGB 9.2 (L) 11.5 - 16.0 g/dL    HCT 27.0 (L) 35.0 - 47.0 %    MCV 84.1 80.0 - 99.0 FL    MCH 28.7 26.0 - 34.0 PG    MCHC 34.1 30.0 - 36.5 g/dL    RDW 14.4 11.5 - 14.5 %    PLATELET 412 (L) 814 - 400 K/uL    NEUTROPHILS 73 32 - 75 %    LYMPHOCYTES 19 12 - 49 %    MONOCYTES 7 5 - 13 %    EOSINOPHILS 1 0 - 7 %    BASOPHILS 0 0 - 1 %    ABS. NEUTROPHILS 6.2 1.8 - 8.0 K/UL    ABS. LYMPHOCYTES 1.6 0.8 - 3.5 K/UL    ABS. MONOCYTES 0.6 0.0 - 1.0 K/UL    ABS. EOSINOPHILS 0.1 0.0 - 0.4 K/UL    ABS. BASOPHILS 0.0 0.0 - 0.1 K/UL   METABOLIC PANEL, COMPREHENSIVE    Collection Time: 02/16/17  7:42 PM   Result Value Ref Range    Sodium 143 136 - 145 mmol/L    Potassium 3.6 3.5 - 5.1 mmol/L    Chloride 109 (H) 97 - 108 mmol/L    CO2 24 21 - 32 mmol/L    Anion gap 10 5 - 15 mmol/L    Glucose 110 (H) 65 - 100 mg/dL    BUN 28 (H) 6 - 20 MG/DL    Creatinine 0.65 0.55 - 1.02 MG/DL    BUN/Creatinine ratio 43 (H) 12 - 20      GFR est AA >60 >60 ml/min/1.73m2    GFR est non-AA >60 >60 ml/min/1.73m2    Calcium 7.8 (L) 8.5 - 10.1 MG/DL    Bilirubin, total 0.3 0.2 - 1.0 MG/DL    ALT (SGPT) 16 12 - 78 U/L    AST (SGOT) 14 (L) 15 - 37 U/L    Alk.  phosphatase 64 45 - 117 U/L    Protein, total 5.7 (L) 6.4 - 8.2 g/dL    Albumin 3.0 (L) 3.5 - 5.0 g/dL    Globulin 2.7 2.0 - 4.0 g/dL    A-G Ratio 1.1 1.1 - 2.2     CK W/ REFLX CKMB    Collection Time: 02/16/17  7:42 PM   Result Value Ref Range    CK 34 26 - 192 U/L   TROPONIN I    Collection Time: 02/16/17  7:42 PM   Result Value Ref Range    Troponin-I, Qt. <0.04 <0.05 ng/mL   URINALYSIS W/ REFLEX CULTURE    Collection Time: 02/16/17  7:42 PM   Result Value Ref Range    Color YELLOW/STRAW      Appearance CLEAR CLEAR      Specific gravity 1.020 1.003 - 1.030      pH (UA) 5.5 5.0 - 8.0      Protein NEGATIVE  NEG mg/dL    Glucose NEGATIVE  NEG mg/dL    Ketone NEGATIVE  NEG mg/dL    Bilirubin NEGATIVE  NEG      Blood MODERATE (A) NEG      Urobilinogen 0.2 0.2 - 1.0 EU/dL    Nitrites NEGATIVE  NEG      Leukocyte Esterase SMALL (A) NEG      WBC 5-10 0 - 4 /hpf    RBC 5-10 0 - 5 /hpf    Epithelial cells FEW FEW /lpf    Bacteria 1+ (A) NEG /hpf    UA:UC IF INDICATED URINE CULTURE ORDERED (A) CNI     TYPE & SCREEN    Collection Time: 02/16/17  7:42 PM   Result Value Ref Range    Crossmatch Expiration 02/19/2017     ABO/Rh(D) Chen Kc NEGATIVE     Antibody screen POS     Antibody ID [id number]     Unit number [id number]     Blood component type  LR AS3,2     Unit division 00     Status of unit ALLOCATED     Crossmatch result Compatible     Unit number [id number]     Blood component type  LR AS3,2     Unit division 00     Status of unit ALLOCATED     Crossmatch result Compatible        I have reviewed the results of all available imaging studies. Yes I have personally reviewed the actual    xray    EKG as read by me shows NSR with PVCs rate 88, LAD, no LVH  Left anterior fascicular block, non specific ST-T changes    CXR read by radiology and reviewed by myself shows  Frontal and lateral views of the chest show clear lungs. The heart, mediastinum  and pulmonary vasculature are normal. There is atherosclerosis of the aorta. There are multiple mild thoracic compression deformities without change. IMPRESSION:  No acute findings. ___________________________________________________    Inpatient is warranted for this patient because they presents with syncope, orthostasis, black stools      I have a high level of concern for upper GI bleed s/p recent EGD  I anticipate the stay in the hospital will span at least 2 midnights.     My assessment of the clinical condition and my plan of care is as outlined above  __________________________________________________    Care Plan discussed with:    Patient,  ED Doc     I saw the patient personally, took a history and did a complete physical exam at the bedside. I performed complex decision making in coming up with a diagnostic and treatment plan for the patient. I reviewed the patient's past medical records, current laboratory and radiology results, and actual Xray films/EKG. I have also discussed this case with the involved ED physician.     Risk of deterioration:  High    Total Time Coordinating Admission:   65   minutes    Total Critical Care Time:     Admitting Physician: Jess Condon MD

## 2017-02-17 NOTE — ED PROVIDER NOTES
HPI Comments: Seamus Shaikh is a 80 y.o. female with PMHx of HTN, hypothyroidism, CAD, aortic root dilatation, and dyslipidemia, presenting ambulatory to ED c/o episode of syncope PTA. Pt reports she went to the bathroom and woke up on the floor with fecal incontinence. She endorses black stools today, an episode up mid upper abdominal pain, which resolved, and mild HA in the ED. Pt reports history of AAA and normal US on 2/13/17. Per pt, she had endoscopy two days ago and has felt lightheaded (described as \"swimmy headed\") and has not been eating/drinking much since. She denies prior similar symptoms and taking diuretics. Pt denies dizziness with head movement. She specifically denies knots on her head, SOB, CP, and current abdominal pain. PCP: Manuela Skinner MD  Social Hx: never smoker; - EtOH; - drug use. There are no other complaints, changes, or physical findings at this time. Written by Orin Monae ED Scribe, as dictated by Maxwell Arnold DO. The history is provided by the patient. Past Medical History:   Diagnosis Date    Aortic root dilatation (HCC)      mild    Arthritis      hands    CAD (coronary artery disease) 2001     started seeing Cardiologist 10 years ago for low pulse    Dyslipidemia     GERD (gastroesophageal reflux disease)      does not take anything other than Tums    HTN (hypertension)     Hypothyroidism     Thyroid disease        History reviewed. No pertinent past surgical history. History reviewed. No pertinent family history. Social History     Social History    Marital status:      Spouse name: N/A    Number of children: N/A    Years of education: N/A     Occupational History    Not on file.      Social History Main Topics    Smoking status: Never Smoker    Smokeless tobacco: Not on file    Alcohol use No    Drug use: No    Sexual activity: Not on file     Other Topics Concern    Not on file     Social History Narrative ALLERGIES: Antibiotic [[id number]]    Review of Systems   Constitutional: Negative. Negative for appetite change, chills, fatigue and fever. HENT: Negative for congestion, rhinorrhea, sinus pressure and sore throat. Eyes: Negative. Respiratory: Negative. Negative for cough, choking, chest tightness, shortness of breath and wheezing. Cardiovascular: Negative. Negative for chest pain, palpitations and leg swelling. Gastrointestinal: Negative for abdominal pain, constipation, diarrhea, nausea and vomiting.        + black stools; fecal incontinence;   Endocrine: Negative. Genitourinary: Negative. Negative for difficulty urinating, dysuria, flank pain and urgency. Musculoskeletal: Negative. Skin: Negative. Neurological: Positive for syncope, light-headedness and headaches (mild). Negative for speech difficulty, weakness and numbness. Denies knots on her head; denies dizziness with head movement;   Psychiatric/Behavioral: Negative. All other systems reviewed and are negative. Vitals:    02/16/17 1838 02/16/17 2004 02/16/17 2006 02/16/17 2008   BP: 110/71 107/63 104/52 103/86   Pulse: 85 70 74 74   Resp: 18      Temp: 97.9 °F (36.6 °C)      SpO2: 99%      Weight: 51.7 kg (114 lb)      Height: 5' (1.524 m)               Physical Exam   Constitutional: She is oriented to person, place, and time. She appears well-developed and well-nourished. No distress. HENT:   Head: Normocephalic and atraumatic. Mouth/Throat: Oropharynx is clear and moist. No oropharyngeal exudate. Eyes: EOM are normal. Pupils are equal, round, and reactive to light. Pale conjunctiva   Neck: Normal range of motion. Neck supple. No JVD present. No tracheal deviation present. Cardiovascular: Normal rate, regular rhythm, normal heart sounds and intact distal pulses. No murmur heard. Pulmonary/Chest: Effort normal and breath sounds normal. No stridor. No respiratory distress. She has no wheezes. She has no rales. She exhibits no tenderness. Abdominal: Soft. She exhibits no distension. There is no tenderness. There is no rebound and no guarding. Musculoskeletal: Normal range of motion. She exhibits no edema or tenderness. Neurological: She is alert and oriented to person, place, and time. No cranial nerve deficit. No gross motor or sensory deficits    Skin: Skin is warm and dry. She is not diaphoretic. Psychiatric: She has a normal mood and affect. Her behavior is normal.   Nursing note and vitals reviewed. MDM  Number of Diagnoses or Management Options  Diagnosis management comments: DDx: acute blood loss anemia secondary to GI bleed, dehydration, renal failure, UTI, arrhythmia, ACS. Amount and/or Complexity of Data Reviewed  Clinical lab tests: ordered and reviewed  Tests in the radiology section of CPT®: ordered and reviewed  Tests in the medicine section of CPT®: ordered and reviewed  Review and summarize past medical records: yes  Discuss the patient with other providers: yes (Hospitalist)  Independent visualization of images, tracings, or specimens: yes    Patient Progress  Patient progress: stable    Procedures    Progress Note:  7:06 PM  Pt declines pain medication at the time of exam.  Written by eLi Tirado ED Scribkarma, as dictated by Dg Razo DO. Pt with recent GI procedure had two polyps removed, now with black tarry stool, after IV fluid bolus, pt still feels dizzy with standing and ambulation. Last Hgb from 2012, no recent blood work in system. Given symptoms with rectal exam showing black tarry stool heme + and symptoms will admit to trend Hgb may need repeat endoscopy. Procedure Note - Rectal Exam:   12:00 AM  Performed by: Dg Razo DO  Chaperoned by: nursing  Rectal exam performed. black stool was collected. Stool was Hemoccult tested, and found to be heme Positive.    The procedure took 1-15 minutes, and pt tolerated well.  Written by Joan Conte, ED Scribe, as dictated by Zak Cross DO.    CONSULT NOTE:   12:11 AM  Zak Cross DO spoke with Dr. Aime Galindo,   Specialty: Hospitalist  Discussed pt's hx, disposition, and available diagnostic and imaging results. Reviewed care plans. Consultant will evaluate pt for admission. Written by Joan Conte, ED Scribe, as dictated by Zak Cross DO.    LABORATORY TESTS:  Recent Results (from the past 12 hour(s))   EKG, 12 LEAD, INITIAL    Collection Time: 02/16/17  6:32 PM   Result Value Ref Range    Ventricular Rate 88 BPM    Atrial Rate 88 BPM    P-R Interval 150 ms    QRS Duration 82 ms    Q-T Interval 340 ms    QTC Calculation (Bezet) 411 ms    Calculated P Axis 16 degrees    Calculated R Axis -58 degrees    Calculated T Axis 163 degrees    Diagnosis       Sinus rhythm with occasional premature ventricular complexes  Left anterior fascicular block  Cannot rule out Anterior infarct (cited on or before 16-FEB-2017)  Abnormal ECG  When compared with ECG of 05-JUL-2012 10:53,  Significant changes have occurred     CBC WITH AUTOMATED DIFF    Collection Time: 02/16/17  7:42 PM   Result Value Ref Range    WBC 8.4 3.6 - 11.0 K/uL    RBC 3.21 (L) 3.80 - 5.20 M/uL    HGB 9.2 (L) 11.5 - 16.0 g/dL    HCT 27.0 (L) 35.0 - 47.0 %    MCV 84.1 80.0 - 99.0 FL    MCH 28.7 26.0 - 34.0 PG    MCHC 34.1 30.0 - 36.5 g/dL    RDW 14.4 11.5 - 14.5 %    PLATELET 906 (L) 523 - 400 K/uL    NEUTROPHILS 73 32 - 75 %    LYMPHOCYTES 19 12 - 49 %    MONOCYTES 7 5 - 13 %    EOSINOPHILS 1 0 - 7 %    BASOPHILS 0 0 - 1 %    ABS. NEUTROPHILS 6.2 1.8 - 8.0 K/UL    ABS. LYMPHOCYTES 1.6 0.8 - 3.5 K/UL    ABS. MONOCYTES 0.6 0.0 - 1.0 K/UL    ABS. EOSINOPHILS 0.1 0.0 - 0.4 K/UL    ABS.  BASOPHILS 0.0 0.0 - 0.1 K/UL   METABOLIC PANEL, COMPREHENSIVE    Collection Time: 02/16/17  7:42 PM   Result Value Ref Range    Sodium 143 136 - 145 mmol/L    Potassium 3.6 3.5 - 5.1 mmol/L    Chloride 109 (H) 97 - 108 mmol/L CO2 24 21 - 32 mmol/L    Anion gap 10 5 - 15 mmol/L    Glucose 110 (H) 65 - 100 mg/dL    BUN 28 (H) 6 - 20 MG/DL    Creatinine 0.65 0.55 - 1.02 MG/DL    BUN/Creatinine ratio 43 (H) 12 - 20      GFR est AA >60 >60 ml/min/1.73m2    GFR est non-AA >60 >60 ml/min/1.73m2    Calcium 7.8 (L) 8.5 - 10.1 MG/DL    Bilirubin, total 0.3 0.2 - 1.0 MG/DL    ALT (SGPT) 16 12 - 78 U/L    AST (SGOT) 14 (L) 15 - 37 U/L    Alk.  phosphatase 64 45 - 117 U/L    Protein, total 5.7 (L) 6.4 - 8.2 g/dL    Albumin 3.0 (L) 3.5 - 5.0 g/dL    Globulin 2.7 2.0 - 4.0 g/dL    A-G Ratio 1.1 1.1 - 2.2     CK W/ REFLX CKMB    Collection Time: 02/16/17  7:42 PM   Result Value Ref Range    CK 34 26 - 192 U/L   TROPONIN I    Collection Time: 02/16/17  7:42 PM   Result Value Ref Range    Troponin-I, Qt. <0.04 <0.05 ng/mL   URINALYSIS W/ REFLEX CULTURE    Collection Time: 02/16/17  7:42 PM   Result Value Ref Range    Color YELLOW/STRAW      Appearance CLEAR CLEAR      Specific gravity 1.020 1.003 - 1.030      pH (UA) 5.5 5.0 - 8.0      Protein NEGATIVE  NEG mg/dL    Glucose NEGATIVE  NEG mg/dL    Ketone NEGATIVE  NEG mg/dL    Bilirubin NEGATIVE  NEG      Blood MODERATE (A) NEG      Urobilinogen 0.2 0.2 - 1.0 EU/dL    Nitrites NEGATIVE  NEG      Leukocyte Esterase SMALL (A) NEG      WBC 5-10 0 - 4 /hpf    RBC 5-10 0 - 5 /hpf    Epithelial cells FEW FEW /lpf    Bacteria 1+ (A) NEG /hpf    UA:UC IF INDICATED URINE CULTURE ORDERED (A) CNI     TYPE & SCREEN    Collection Time: 02/16/17  7:42 PM   Result Value Ref Range    Crossmatch Expiration 02/19/2017     ABO/Rh(D) O NEGATIVE     Antibody screen POS     Antibody ID [id number]     Unit number [id number]     Blood component type  LR AS3,2     Unit division 00     Status of unit ALLOCATED     Crossmatch result Compatible     Unit number [id number]     Blood component type  LR AS3,2     Unit division 00     Status of unit ALLOCATED     Crossmatch result Compatible        IMAGING RESULTS:  History: Syncope.     Frontal and lateral views of the chest show clear lungs. The heart, mediastinum  and pulmonary vasculature are normal. There is atherosclerosis of the aorta. There are multiple mild thoracic compression deformities without change.     IMPRESSION  IMPRESSION:  No acute findings.               MEDICATIONS GIVEN:  Medications   sodium chloride (NS) flush 5-10 mL (not administered)   sodium chloride (NS) flush 5-10 mL (not administered)   pantoprazole (PROTONIX) 80 mg in sodium chloride 0.9 % 20 mL injection (not administered)   sodium chloride 0.9 % bolus infusion 500 mL (0 mL IntraVENous IV Completed 2/16/17 2129)       IMPRESSION:   1. Acute blood loss anemia    2. Gastrointestinal hemorrhage, unspecified gastrointestinal hemorrhage type    3. Dizziness        PLAN:  1. Admit to Hospitalist.    12:11 AM  Patient is being admitted to the hospital by Dr. Antelmo Sands. The results of their tests and reasons for their admission have been discussed with them and/or available family. They convey agreement and understanding for the need to be admitted and for their admission diagnosis. Consultation has been made with the inpatient physician specialist for hospitalization. Written by Julia Desir ED Scribe, as dictated by Cassell Fleischer, DO. This note is prepared by Julia Desir, acting as Scribe for Cassell Fleischer, [address] [place] [address]: The scribe's documentation has been prepared under my direction and personally reviewed by me in its entirety. I confirm that the note above accurately reflects all work, treatment, procedures, and medical decision making performed by me.

## 2017-02-17 NOTE — ED NOTES
TRANSFER - OUT REPORT:    Verbal report given to BERNARDINO Griffith (name) on 1 [facility]  being transferred to [facility] (unit) for routine progression of care       Report consisted of patients Situation, Background, Assessment and   Recommendations(SBAR). Information from the following report(s) ED Summary was reviewed with the receiving nurse. Lines:   Peripheral IV 02/16/17 Left Antecubital (Active)   Site Assessment Clean, dry, & intact 2/16/2017  7:41 PM   Phlebitis Assessment 0 2/16/2017  7:41 PM   Infiltration Assessment 0 2/16/2017  7:41 PM   Dressing Status Clean, dry, & intact 2/16/2017  7:41 PM   Dressing Type Tape;Transparent 2/16/2017  7:41 PM   Hub Color/Line Status Pink 2/16/2017  7:41 PM       Peripheral IV 02/17/17 Right Antecubital (Active)   Site Assessment Clean, dry, & intact 2/17/2017 11:44 AM   Phlebitis Assessment 0 2/17/2017 11:44 AM   Infiltration Assessment 0 2/17/2017 11:44 AM   Dressing Status Clean, dry, & intact 2/17/2017 11:44 AM   Dressing Type Tape;Transparent 2/17/2017 11:44 AM   Hub Color/Line Status Pink;Flushed 2/17/2017 11:44 AM   Action Taken Blood drawn 2/17/2017 11:44 AM        Opportunity for questions and clarification was provided.       Patient to be transported from [facility] to room 2107

## 2017-02-17 NOTE — CONSULTS
GI Consultation Note Awa Ayala)    NAME: Marivel Harris : 1934 MRN: [id number]   ATTG: Gage Pineda MD Prim GI: ELVIN Yang MD PCP: Verner Grooms, MD  Date/Time:  2017 11:47 AM  Subjective:   REASON FOR CONSULT:      [place] is a 80 y.o.  female on ASA, who I was asked to see for evaluation of suspected upper GI Bleeding. She was admitted overnight via ER where she presented c/o black stool and passing out, having undergone EGD on 17 only 2d earlier at which time gastric biopsies showed minimal inflammation and no ulcers were noted. That study was undertaken as evaluation of upper abdominal associated with poor appetite over the last few months. She notes that the day after EGD, she began feeling light-headed and dizzy on rising and would feel better after lying down for a bit. She described no change in her BMs until the day PTA admission, she began having urgency with BMs and experienced a syncopal episode after she bent over and woke up passed out on the floor surrounded by a large melanic liquid BM. Her light-headedness continued and she came to ER c/o of associated mild periumbilical cramping and feeling cold. She denied N/V, hematemesis, CP, SOB. She denies similar episodes in the past.  Her last colonoscopy was >10yrs ago. She does not take any acid suppressive medications. Her Hgb was 14.8 on 17. ER eval noted /50 and ARELIS with melena. Initial Hgb was 9.2 and when rechecked was 7.8 and then 7.2.  BUN:Cr was 28/0. 65. She continues to feel weak. Past Medical History   Diagnosis Date    Aortic root dilatation (HCC)      mild    Arthritis      hands    CAD (coronary artery disease)      started seeing Cardiologist 10 years ago for low pulse    Dyslipidemia     GERD (gastroesophageal reflux disease)      does not take anything other than Tums    HTN (hypertension)     Hypothyroidism     Thyroid disease       History reviewed.  No pertinent past surgical history. Social History   Substance Use Topics    Smoking status: Never Smoker    Smokeless tobacco: Not on file    Alcohol use No      History reviewed. No pertinent family history. Allergies   Allergen Reactions    Antibiotic [Lpdvg-Btgix-Ydnnrps-Pramoxine] Other (comments)     Unknown antibiotic begins with a \"C\"      Home Medications:  Prior to Admission Medications   Prescriptions Last Dose Informant Patient Reported? Taking? Calcium Carbonate-Vit D3-Min (CALTRATE 600+D PLUS MINERALS) 600 mg calcium- 400 unit Tab   Yes No   Sig: Take 2 Tabs by mouth daily. HYDROcodone-acetaminophen (NORCO) 5-325 mg per tablet   No No   Sig: Take 1 Tab by mouth every six (6) hours as needed for Pain. Max Daily Amount: 4 Tabs. LATANOPROST (XALATAN OP)   Yes No   Sig: Apply 1 Drop to eye daily. aspirin 81 mg tablet   Yes No   Sig: Take  by mouth daily. atorvastatin (LIPITOR) 20 mg tablet   No No   Sig: Take 1 Tab by mouth daily. brimonidine (ALPHAGAN) 0.15 % ophthalmic solution   Yes No   Sig: Administer 1 Drop to both eyes three (3) times daily. levothyroxine (SYNTHROID) 125 mcg tablet   Yes No   Sig: Take  by mouth daily (before breakfast). lisinopril-hydrochlorothiazide (PRINZIDE, ZESTORETIC) 20-25 mg per tablet   Yes No   Sig: Take  by mouth daily. multivitamins-minerals-lutein (CENTRUM SILVER) Tab   Yes No   Sig: Take 1 Tab by mouth.         Facility-Administered Medications: None     Hospital medications:  Current Facility-Administered Medications   Medication Dose Route Frequency    sodium chloride (NS) flush 5-10 mL  5-10 mL IntraVENous Q8H    sodium chloride (NS) flush 5-10 mL  5-10 mL IntraVENous PRN    acetaminophen (TYLENOL) tablet 650 mg  650 mg Oral Q6H PRN    naloxone (NARCAN) injection 0.4 mg  0.4 mg IntraVENous PRN    ondansetron (ZOFRAN) injection 4 mg  4 mg IntraVENous Q4H PRN    bisacodyl (DULCOLAX) suppository 10 mg  10 mg Rectal DAILY PRN    0.9% sodium chloride infusion  100 mL/hr IntraVENous CONTINUOUS    pantoprazole (PROTONIX) 40 mg in sodium chloride 0.9 % 10 mL injection  40 mg IntraVENous Q12H    atorvastatin (LIPITOR) tablet 20 mg  20 mg Oral QHS    brimonidine (ALPHAGAN) 0.2 % ophthalmic solution 1 Drop  1 Drop Both Eyes TID    latanoprost (XALATAN) 0.005 % ophthalmic solution 1 Drop  1 Drop Both Eyes DAILY    levothyroxine (SYNTHROID) tablet 125 mcg  125 mcg Oral ACB    sodium chloride (NS) flush 5-10 mL  5-10 mL IntraVENous Q8H    sodium chloride (NS) flush 5-10 mL  5-10 mL IntraVENous PRN     Current Outpatient Prescriptions   Medication Sig    HYDROcodone-acetaminophen (NORCO) 5-325 mg per tablet Take 1 Tab by mouth every six (6) hours as needed for Pain. Max Daily Amount: 4 Tabs.  atorvastatin (LIPITOR) 20 mg tablet Take 1 Tab by mouth daily.  Calcium Carbonate-Vit D3-Min (CALTRATE 600+D PLUS MINERALS) 600 mg calcium- 400 unit Tab Take 2 Tabs by mouth daily.  multivitamins-minerals-lutein (CENTRUM SILVER) Tab Take 1 Tab by mouth.  brimonidine (ALPHAGAN) 0.15 % ophthalmic solution Administer 1 Drop to both eyes three (3) times daily.  aspirin 81 mg tablet Take  by mouth daily.  lisinopril-hydrochlorothiazide (PRINZIDE, ZESTORETIC) 20-25 mg per tablet Take  by mouth daily.  levothyroxine (SYNTHROID) 125 mcg tablet Take  by mouth daily (before breakfast).  LATANOPROST (XALATAN OP) Apply 1 Drop to eye daily.      REVIEW OF SYSTEMS:     [x]    Total of 11 systems reviewed as follows:  Const:   negative fever, negative chills, negative weight loss  Eyes:   negative diplopia or visual changes, negative eye pain  ENT:   negative coryza, negative sore throat  Resp:   negative cough, hemoptysis, dyspnea  Cards:  negative for chest pain, palpitations, lower extremity edema  :  negative for frequency, dysuria and hematuria  Skin:   negative for rash and pruritus  Heme:   negative for easy bruising and gum/nose bleeding  MS:  negative for myalgias, arthralgias, back pain and muscle weakness  Neurolo:  negative for headaches, dizziness, vertigo, memory problems   Psych:  negative for feelings of anxiety, depression     Pertinent Positives include :    Objective:   VITALS:    Visit Vitals    /59    Pulse 75    Temp 97.9 °F (36.6 °C)    Resp 18    Ht 5' (1.524 m)    Wt 51.7 kg (114 lb)    SpO2 100%    BMI 22.26 kg/m2     Temp (24hrs), Av.9 °F (36.6 °C), Min:97.9 °F (36.6 °C), Max:97.9 °F (36.6 °C)    PHYSICAL EXAM:   General:    Alert, cooperative, no distress, appears stated age. Head:   Normocephalic, without obvious abnormality, atraumatic. Eyes:   Conjunctivae clear, anicteric sclerae. Pupils are equal  Nose:  Nares normal. No drainage or sinus tenderness. Throat:    Lips, mucosa, and tongue normal. Upper/lower plates  Neck:  Supple, symmetrical,  no adenopathy, thyroid: non tender  Back:    Symmetric,  No CVA tenderness. Lungs:   CTA bilaterally. No wheezing/rhonchi/rales. Chest wall:  No tenderness or deformity. No Accessory muscle use. Heart:   Regular rate and rhythm,  no murmur, rub or gallop. Abdomen:   Soft, non-tender. Not distended. NABS. No masses  Extremities: Atraumatic, No cyanosis. No edema. No clubbing  Skin:     Texture, turgor normal. No rashes/lesions/jaundice  Lymph: Cervical, supraclavicular normal.  Psych:  Good insight. Not depressed. Not anxious or agitated. Neurologic: EOMs intact. Normal  strength, A/O X 3.      LAB DATA REVIEWED:    Recent Results (from the past 48 hour(s))   EKG, 12 LEAD, INITIAL    Collection Time: 17  6:32 PM   Result Value Ref Range    Ventricular Rate 88 BPM    Atrial Rate 88 BPM    P-R Interval 150 ms    QRS Duration 82 ms    Q-T Interval 340 ms    QTC Calculation (Bezet) 411 ms    Calculated P Axis 16 degrees    Calculated R Axis -58 degrees    Calculated T Axis 163 degrees    Diagnosis       Sinus rhythm with occasional premature ventricular complexes  Left anterior fascicular block  Cannot rule out Anterior infarct (cited on or before 16-FEB-2017)  When compared with ECG of 05-JUL-2012 10:53,  Heart rate has increased. Confirmed by Victor M Turner ([id number]) on 2/17/2017 11:16:45 AM     CBC WITH AUTOMATED DIFF    Collection Time: 02/16/17  7:42 PM   Result Value Ref Range    WBC 8.4 3.6 - 11.0 K/uL    RBC 3.21 (L) 3.80 - 5.20 M/uL    HGB 9.2 (L) 11.5 - 16.0 g/dL    HCT 27.0 (L) 35.0 - 47.0 %    MCV 84.1 80.0 - 99.0 FL    MCH 28.7 26.0 - 34.0 PG    MCHC 34.1 30.0 - 36.5 g/dL    RDW 14.4 11.5 - 14.5 %    PLATELET 866 (L) 765 - 400 K/uL    NEUTROPHILS 73 32 - 75 %    LYMPHOCYTES 19 12 - 49 %    MONOCYTES 7 5 - 13 %    EOSINOPHILS 1 0 - 7 %    BASOPHILS 0 0 - 1 %    ABS. NEUTROPHILS 6.2 1.8 - 8.0 K/UL    ABS. LYMPHOCYTES 1.6 0.8 - 3.5 K/UL    ABS. MONOCYTES 0.6 0.0 - 1.0 K/UL    ABS. EOSINOPHILS 0.1 0.0 - 0.4 K/UL    ABS. BASOPHILS 0.0 0.0 - 0.1 K/UL   METABOLIC PANEL, COMPREHENSIVE    Collection Time: 02/16/17  7:42 PM   Result Value Ref Range    Sodium 143 136 - 145 mmol/L    Potassium 3.6 3.5 - 5.1 mmol/L    Chloride 109 (H) 97 - 108 mmol/L    CO2 24 21 - 32 mmol/L    Anion gap 10 5 - 15 mmol/L    Glucose 110 (H) 65 - 100 mg/dL    BUN 28 (H) 6 - 20 MG/DL    Creatinine 0.65 0.55 - 1.02 MG/DL    BUN/Creatinine ratio 43 (H) 12 - 20      GFR est AA >60 >60 ml/min/1.73m2    GFR est non-AA >60 >60 ml/min/1.73m2    Calcium 7.8 (L) 8.5 - 10.1 MG/DL    Bilirubin, total 0.3 0.2 - 1.0 MG/DL    ALT (SGPT) 16 12 - 78 U/L    AST (SGOT) 14 (L) 15 - 37 U/L    Alk.  phosphatase 64 45 - 117 U/L    Protein, total 5.7 (L) 6.4 - 8.2 g/dL    Albumin 3.0 (L) 3.5 - 5.0 g/dL    Globulin 2.7 2.0 - 4.0 g/dL    A-G Ratio 1.1 1.1 - 2.2     CK W/ REFLX CKMB    Collection Time: 02/16/17  7:42 PM   Result Value Ref Range    CK 34 26 - 192 U/L   TROPONIN I    Collection Time: 02/16/17  7:42 PM   Result Value Ref Range    Troponin-I, Qt. <0.04 <0.05 ng/mL   URINALYSIS W/ REFLEX CULTURE Collection Time: 02/16/17  7:42 PM   Result Value Ref Range    Color YELLOW/STRAW      Appearance CLEAR CLEAR      Specific gravity 1.020 1.003 - 1.030      pH (UA) 5.5 5.0 - 8.0      Protein NEGATIVE  NEG mg/dL    Glucose NEGATIVE  NEG mg/dL    Ketone NEGATIVE  NEG mg/dL    Bilirubin NEGATIVE  NEG      Blood MODERATE (A) NEG      Urobilinogen 0.2 0.2 - 1.0 EU/dL    Nitrites NEGATIVE  NEG      Leukocyte Esterase SMALL (A) NEG      WBC 5-10 0 - 4 /hpf    RBC 5-10 0 - 5 /hpf    Epithelial cells FEW FEW /lpf    Bacteria 1+ (A) NEG /hpf    UA:UC IF INDICATED URINE CULTURE ORDERED (A) CNI     TYPE & SCREEN    Collection Time: 02/16/17  7:42 PM   Result Value Ref Range    Crossmatch Expiration 02/19/2017     ABO/Rh(D) O NEGATIVE     Antibody screen POS     Antibody ID [id number]     Unit number [id number]     Blood component type  LR AS3,2     Unit division 00     Status of unit ALLOCATED     Crossmatch result Compatible     Unit number [id number]     Blood component type  LR AS3,2     Unit division 00     Status of unit ALLOCATED     Crossmatch result Compatible    CBC WITH AUTOMATED DIFF    Collection Time: 02/17/17  4:19 AM   Result Value Ref Range    WBC 6.7 3.6 - 11.0 K/uL    RBC 2.72 (L) 3.80 - 5.20 M/uL    HGB 7.8 (L) 11.5 - 16.0 g/dL    HCT 22.8 (L) 35.0 - 47.0 %    MCV 83.8 80.0 - 99.0 FL    MCH 28.7 26.0 - 34.0 PG    MCHC 34.2 30.0 - 36.5 g/dL    RDW 14.7 (H) 11.5 - 14.5 %    PLATELET 633 (L) 728 - 400 K/uL    NEUTROPHILS 64 32 - 75 %    LYMPHOCYTES 27 12 - 49 %    MONOCYTES 7 5 - 13 %    EOSINOPHILS 2 0 - 7 %    BASOPHILS 0 0 - 1 %    ABS. NEUTROPHILS 4.3 1.8 - 8.0 K/UL    ABS. LYMPHOCYTES 1.8 0.8 - 3.5 K/UL    ABS. MONOCYTES 0.5 0.0 - 1.0 K/UL    ABS. EOSINOPHILS 0.1 0.0 - 0.4 K/UL    ABS.  BASOPHILS 0.0 0.0 - 0.1 K/UL   METABOLIC PANEL, BASIC    Collection Time: 02/17/17  4:19 AM   Result Value Ref Range    Sodium 145 136 - 145 mmol/L    Potassium 3.6 3.5 - 5.1 mmol/L    Chloride 111 (H) 97 - 108 mmol/L CO2 25 21 - 32 mmol/L    Anion gap 9 5 - 15 mmol/L    Glucose 84 65 - 100 mg/dL    BUN 22 (H) 6 - 20 MG/DL    Creatinine 0.53 (L) 0.55 - 1.02 MG/DL    BUN/Creatinine ratio 42 (H) 12 - 20      GFR est AA >60 >60 ml/min/1.73m2    GFR est non-AA >60 >60 ml/min/1.73m2    Calcium 7.8 (L) 8.5 - 10.1 MG/DL   TROPONIN I    Collection Time: 02/17/17  4:19 AM   Result Value Ref Range    Troponin-I, Qt. <0.04 <0.05 ng/mL   PROTHROMBIN TIME + INR    Collection Time: 02/17/17  4:19 AM   Result Value Ref Range    INR 1.1 0.9 - 1.1      Prothrombin time 11.5 (H) 9.0 - 11.1 sec   HGB & HCT    Collection Time: 02/17/17  8:40 AM   Result Value Ref Range    HGB 7.2 (L) 11.5 - 16.0 g/dL    HCT 21.0 (L) 35.0 - 47.0 %     IMAGING RESULTS:   [x]      I have personally reviewed the actual   [x]    CXR 2/17/17 nl    Recommendations/Plan:      Active Problems:    Upper GI bleeding (2/17/2017)       ___________________________________________________  RECOMMENDATIONS:    79yo F with symptomatic acute anemia c/w Upper GI bleeding, manifest as melena and syncope accompanied by drop in Hgb from 14 to 9 and now 7. Increased BUN:CR ratio also supports GIB. She is at risk from bleeding in setting of advanced age and ongoing ASA use with no GI prophylaxis in addition to recent EGD Bx. It is possible that a definitive lesion as source of her pain or bleeding was not identified at the time of her prior EGD, although bx then showed only mild inflammation.     Plan:  1) Keep NPO  2) IVF  3) IV PPI as gtt or Q12 infusion  4) EGD today  5) Serial H/H q6-8hrs x 24hrs, then every day  6) Consider for transfusion of 2units PRBC dependent on EGd findings and sx  7) 2 IVs at all times  8) Hold ASA; no Lovenox  9) Admit to telemetry bed; may need ICU or PCU if identify visible vessel    Discussed Code Status:    [x]    Full Code      []    DNR    ___________________________________________________  Care Plan discussed with:    [x]    Patient   [x] Family   [x]    Nursing   []    Attending  Total Time :  50   minutes   ___________________________________________________  GI: Jeff De Leon MD

## 2017-02-17 NOTE — PERIOP NOTES
Anesthesia reports 70mg Propofol, 50mg Lidocaine and 200mL NS given during procedure. Received report from anesthesia staff on vital signs and status of patient.

## 2017-02-17 NOTE — ED NOTES
Pt ambulated to ED restroom with RN assist without any sign of distress. Pt had small BM, upon standing from ED toilet patient became diaphoretic and dizzy witnessed by RN. Pt assisted back to toilet with RN assistance. Pt then had witnessed syncopal episode witnessed by this RN  while in sitting position on toilet. ER MD and secondary RN called for assistance at this time. Pt placed in wheelchair. MD Stoney Webster at patient side to evaluate patient. Pt placed back in ED bed at this time and returned to monitor. Pt VS WNL after returning to ED bed. MD Stoney Webster gave verbal request to bolus the rest of NS IV fluids and then return patient to 100mL/hr IV NS drip. Pt reports she feels much better after returning to ED bed. Bed in low position with side rails up x2 at this time. Call light within reach. Family at bedside.

## 2017-02-17 NOTE — CDMP QUERY
Please clarify if this patient is being treated/managed for:    =>1. What is the most likely cause of pt's GIB in setting of recent EGD biopsies, h/o GERD, HTN & TAA? Postop hemorrhage following GI procedure POA?     =>Other Explanation of clinical findings  =>Unable to Determine (no explanation of clinical findings)    The medical record reflects the following clinical findings, treatment, and risk factors:    Risk Factors: 82yoF 2d s/p EGD biopsies, next day syncopal, incontinent of lg black stool, mild periumb pain, heme+ exam in ED    Clinical Indicators: Adm labs-h/h-9/27, plt-123k, on HD#1-h/h-7/22 dropping to 7/21, plt-107k orthostatic 110/71 down to 104/54, EKG: SR w/ PVC's    Treatment: NPO, GI cs, IVF, PPI, serial h/h, hold ASA/BP meds, tele, SCD's. Please clarify and document your clinical opinion in the progress notes and discharge summary. Thank you!   Caleb Gaston, [address] [facility], . Huss[address]

## 2017-02-17 NOTE — ROUTINE PROCESS
Received patient in recovery at [address] [facility]  1934  [id number]    Situation:  Verbal report received from:LEXIS Lozada RN  Procedure: Procedure(s) with comments:  ESOPHAGOGASTRODUODENOSCOPY (EGD) - ED patient  INJECTION  RESOLUTION CLIP    Background:    Preoperative diagnosis: GI bleed  Postoperative diagnosis: Gastric ulcer with visible vessel    :  Dr. Katina Palomino  Assistant(s): Endoscopy RN-1: Gentry Ortega RN; Amadeo Cassidy RN    Specimens: * No specimens in log *  H. Pylori  no    Assessment:  Intra-procedure medications   Anesthesia gave intra-procedure sedation and medications, see anesthesia flow sheet     Intravenous fluids: NS@ Annanahi Melendez     Vital signs stable     Abdominal assessment: round and soft     Recommendation:  .   Return to floor [id number]  Family or Friend     Permission to share finding with family or friend yes

## 2017-02-17 NOTE — PROCEDURES
NAME:  Benjamin Bliss   :   1934   MRN:   [id number]     Date/Time:  2017 3:59 PM    Esophagogastroduodenoscopy (EGD) Procedure Note    Procedure: Esophagogastroduodenoscopy with control of bleeding    Indication:  Melena/hematochezia  Pre-operative Diagnosis: see indication above  Post-operative Diagnosis: see findings below  :  Landy Arriaga MD  Referring Provider:   Dayana Elam MD; Radha Lindquist MD;Dionte Samayoa MD    Exam:  Airway: clear, no airway problems anticipated  Heart: RRR, without gallops or rubs  Lungs: clear bilaterally without wheezes, crackles, or rhonchi  Abdomen: soft, nontender, nondistended, bowel sounds present  Mental Status: awake, alert and oriented to person, place and time     Anethesia/Sedation:  MAC anesthesia Propofol 70mg IV  Procedure Details   After informed consent was obtained for the procedure, with all risks and benefits of procedure explained the patient was taken to the endoscopy suite and placed in the left lateral decubitus position. Following sequential administration of sedation as per above, the [id number] gastroscope was inserted into the mouth and advanced under direct vision to second portion of the duodenum. A careful inspection was made as the gastroscope was withdrawn, including a retroflexed view of the proximal stomach; findings and interventions are described below.                   Findings:    -Normal esophagus  -Single shallow gastric antral 2mm ulceration without vessel; injected with 1 cc Epi 1:69932 mix with blanching  -Single shallow gastric incisural ulceration 4mm diameter with central red visible vessel; injected with 2cc Epi 1:75507 mix with blanching, followed by placement of 2 hemoclips across visible vessel  -Normal duodenum    Therapies:  gastric antral 2mm ulceration without vessel; injected with 1 cc Epi 1:73201 mix with blanching  -Single shallow gastric incisural ulceration 4mm diameter with central red visible vessel; injected with 2cc Epi 1:37116 mix with blanching, followed by placement of 2 hemoclips across visible vessel  Specimens: None  EBL:  None. Complications:   None; patient tolerated the procedure well. Impression:    -Normal esophagus  -Single shallow gastric antral 2mm ulceration without vessel; injected with 1 cc Epi 1:89117 mix with blanching  -Single shallow gastric incisural ulceration 4mm diameter with central red visible vessel; injected with 2cc Epi 1:85822 mix with blanching, followed by placement of 2 hemoclips across visible vessel  -Normal duodenum    Recommendations:  -Acid suppression with a proton pump inhibitor. ,   -Add Carafate tab QID  -Follow clinical symptoms and laboratory studies for evidence of rebleeding. ,  -Keep NPO except meds  -Admit to PCU/monitored bed  -Transfuse 2 units PRBC now as c/o CP and nausea    Discharge disposition:  To room after recovery in Endoscopy; plan d/w  (hospitalist)    Pankaj Valenzuela MD

## 2017-02-17 NOTE — H&P
See office History and Physical/prior Consultation Note. The patient was reexamined. No interval change in the last 30 days. Proceed with procedure(s) with deep sedation or conscious sedation as clinically indicated.

## 2017-02-17 NOTE — PERIOP NOTES
TRANSFER - OUT REPORT:    Verbal report given to Devika(name) on 1 [facility]  being transferred to [facility](unit) for ordered procedure       Report consisted of patients Situation, Background, Assessment and   Recommendations(SBAR). Information from the following report(s) SBAR was reviewed with the receiving nurse. Lines:   Peripheral IV 02/16/17 Left Antecubital (Active)   Site Assessment Clean, dry, & intact 2/16/2017  7:41 PM   Phlebitis Assessment 0 2/16/2017  7:41 PM   Infiltration Assessment 0 2/16/2017  7:41 PM   Dressing Status Clean, dry, & intact 2/16/2017  7:41 PM   Dressing Type Tape;Transparent 2/16/2017  7:41 PM   Hub Color/Line Status Pink 2/16/2017  7:41 PM       Peripheral IV 02/17/17 Right Antecubital (Active)   Site Assessment Clean, dry, & intact 2/17/2017 11:44 AM   Phlebitis Assessment 0 2/17/2017 11:44 AM   Infiltration Assessment 0 2/17/2017 11:44 AM   Dressing Status Clean, dry, & intact 2/17/2017 11:44 AM   Dressing Type Tape;Transparent 2/17/2017 11:44 AM   Hub Color/Line Status Pink;Flushed 2/17/2017 11:44 AM   Action Taken Blood drawn 2/17/2017 11:44 AM        Opportunity for questions and clarification was provided.       Patient transported with:

## 2017-02-17 NOTE — ANESTHESIA PREPROCEDURE EVALUATION
Anesthetic History   No history of anesthetic complications            Review of Systems / Medical History  Patient summary reviewed, nursing notes reviewed and pertinent labs reviewed    Pulmonary  Within defined limits                 Neuro/Psych   Within defined limits           Cardiovascular    Hypertension          CAD and hyperlipidemia    Exercise tolerance: >4 METS  Comments: PVCs  Left anterior fascicular block   Cannot rule out Anterior infarct   Aortic root dilatation (mild)   GI/Hepatic/Renal     GERD          Comments: Hx GI bleed Endo/Other      Hypothyroidism  Arthritis and anemia     Other Findings            Physical Exam    Airway  Mallampati: II    Neck ROM: normal range of motion   Mouth opening: Normal     Cardiovascular  Regular rate and rhythm,  S1 and S2 normal,  no murmur, click, rub, or gallop             Dental    Dentition: Full upper dentures and Full lower dentures     Pulmonary  Breath sounds clear to auscultation               Abdominal  GI exam deferred       Other Findings            Anesthetic Plan    ASA: 3  Anesthesia type: total IV anesthesia          Induction: Intravenous  Anesthetic plan and risks discussed with: Patient

## 2017-02-17 NOTE — ED NOTES
TRANSFER - OUT REPORT:    Verbal report given to jarad (name) on 1 [facility]  being transferred to endo (unit) for ordered procedure       Report consisted of patients Situation, Background, Assessment and   Recommendations(SBAR). Information from the following report(s) ED Summary was reviewed with the receiving nurse. Lines:   Peripheral IV 02/16/17 Left Antecubital (Active)   Site Assessment Clean, dry, & intact 2/16/2017  7:41 PM   Phlebitis Assessment 0 2/16/2017  7:41 PM   Infiltration Assessment 0 2/16/2017  7:41 PM   Dressing Status Clean, dry, & intact 2/16/2017  7:41 PM   Dressing Type Tape;Transparent 2/16/2017  7:41 PM   Hub Color/Line Status Pink 2/16/2017  7:41 PM       Peripheral IV 02/17/17 Right Antecubital (Active)   Site Assessment Clean, dry, & intact 2/17/2017 11:44 AM   Phlebitis Assessment 0 2/17/2017 11:44 AM   Infiltration Assessment 0 2/17/2017 11:44 AM   Dressing Status Clean, dry, & intact 2/17/2017 11:44 AM   Dressing Type Tape;Transparent 2/17/2017 11:44 AM   Hub Color/Line Status Pink;Flushed 2/17/2017 11:44 AM   Action Taken Blood drawn 2/17/2017 11:44 AM        Opportunity for questions and clarification was provided.       Patient transported with:   [employer]

## 2017-02-17 NOTE — CDMP QUERY
Please clarify if this patient is being treated/managed for:    =>2. What is the clinical significance of sudden drop in pt's heme panel? Acute blood loss anemia POA? Precipitous drop in hgb and hct POA?    =>Other Explanation of clinical findings  =>Unable to Determine (no explanation of clinical findings)    The medical record reflects the following clinical findings, treatment, and risk factors:    Risk Factors: 82yoF 2d s/p EGD biopsies, next day syncopal, incontinent of lg black stool, mild periumb pain, heme+ exam in ED    Clinical Indicators: Adm labs-h/h-9/27, plt-123k, on HD#1-h/h-7/22 dropping to 7/21, plt-107k orthostatic 110/71 down to 104/54, EKG: SR w/ PVC's    Treatment: NPO, GI cs, IVF, PPI, serial h/h, hold ASA/BP meds, tele, SCD's. Please clarify and document your clinical opinion in the progress notes and discharge summary. Thank you!   Kelsey Maciel, [address] [facility], . [address]

## 2017-02-17 NOTE — ED NOTES
RN at bedside for hemoccult test at this time assisting ER MD. Patient is hemoccult positive at this time

## 2017-02-17 NOTE — ANESTHESIA POSTPROCEDURE EVALUATION
Post-Anesthesia Evaluation and Assessment    Patient: Mary Farrell MRN: [id number]  SSN: [id number]    YOB: 1934  Age: 80 y.o. Sex: female       Cardiovascular Function/Vital Signs  Visit Vitals    /73    Pulse 66    Temp 36.9 °C (98.5 °F)    Resp 24    Ht 5' (1.524 m)    Wt 51.7 kg (114 lb)    SpO2 99%    BMI 22.26 kg/m2       Patient is status post total IV anesthesia, general anesthesia for Procedure(s):  ESOPHAGOGASTRODUODENOSCOPY (EGD)  INJECTION  RESOLUTION CLIP. Nausea/Vomiting: None    Postoperative hydration reviewed and adequate. Pain:  Pain Scale 1: Numeric (0 - 10) (02/17/17 1451)  Pain Intensity 1: 0 (02/17/17 1451)   Managed    Neurological Status: At baseline    Mental Status and Level of Consciousness: Arousable    Pulmonary Status:   O2 Device: CO2 nasal cannula (02/17/17 1529)   Adequate oxygenation and airway patent    Complications related to anesthesia: None    Post-anesthesia assessment completed.  No concerns    Signed By: Vandana Farrell MD     February 17, 2017

## 2017-02-18 LAB
ABO + RH BLD: NORMAL
ANION GAP BLD CALC-SCNC: 10 MMOL/L (ref 5–15)
BACTERIA SPEC CULT: NORMAL
BASOPHILS # BLD AUTO: 0 K/UL (ref 0–0.1)
BASOPHILS # BLD: 0 % (ref 0–1)
BLD PROD TYP BPU: NORMAL
BLD PROD TYP BPU: NORMAL
BLOOD GROUP ANTIBODIES SERPL: NORMAL
BLOOD GROUP ANTIBODIES SERPL: NORMAL
BPU ID: NORMAL
BPU ID: NORMAL
BUN SERPL-MCNC: 8 MG/DL (ref 6–20)
BUN/CREAT SERPL: 12 (ref 12–20)
CALCIUM SERPL-MCNC: 7.7 MG/DL (ref 8.5–10.1)
CC UR VC: NORMAL
CHLORIDE SERPL-SCNC: 111 MMOL/L (ref 97–108)
CO2 SERPL-SCNC: 23 MMOL/L (ref 21–32)
CREAT SERPL-MCNC: 0.65 MG/DL (ref 0.55–1.02)
CROSSMATCH RESULT,%XM: NORMAL
CROSSMATCH RESULT,%XM: NORMAL
EOSINOPHIL # BLD: 0.2 K/UL (ref 0–0.4)
EOSINOPHIL NFR BLD: 3 % (ref 0–7)
ERYTHROCYTE [DISTWIDTH] IN BLOOD BY AUTOMATED COUNT: 15 % (ref 11.5–14.5)
GLUCOSE SERPL-MCNC: 105 MG/DL (ref 65–100)
HCT VFR BLD AUTO: 25.1 % (ref 35–47)
HCT VFR BLD AUTO: 26.7 % (ref 35–47)
HCT VFR BLD AUTO: 28.9 % (ref 35–47)
HGB BLD-MCNC: 10 G/DL (ref 11.5–16)
HGB BLD-MCNC: 8.6 G/DL (ref 11.5–16)
HGB BLD-MCNC: 9.2 G/DL (ref 11.5–16)
LYMPHOCYTES # BLD AUTO: 14 % (ref 12–49)
LYMPHOCYTES # BLD: 1 K/UL (ref 0.8–3.5)
MCH RBC QN AUTO: 29.2 PG (ref 26–34)
MCHC RBC AUTO-ENTMCNC: 34.6 G/DL (ref 30–36.5)
MCV RBC AUTO: 84.5 FL (ref 80–99)
MONOCYTES # BLD: 0.5 K/UL (ref 0–1)
MONOCYTES NFR BLD AUTO: 8 % (ref 5–13)
NEUTS SEG # BLD: 5.3 K/UL (ref 1.8–8)
NEUTS SEG NFR BLD AUTO: 75 % (ref 32–75)
PLATELET # BLD AUTO: 105 K/UL (ref 150–400)
POTASSIUM SERPL-SCNC: 3.4 MMOL/L (ref 3.5–5.1)
RBC # BLD AUTO: 3.42 M/UL (ref 3.8–5.2)
SERVICE CMNT-IMP: NORMAL
SODIUM SERPL-SCNC: 144 MMOL/L (ref 136–145)
SPECIMEN EXP DATE BLD: NORMAL
STATUS OF UNIT,%ST: NORMAL
STATUS OF UNIT,%ST: NORMAL
UNIT DIVISION, %UDIV: 0
UNIT DIVISION, %UDIV: 0
WBC # BLD AUTO: 7 K/UL (ref 3.6–11)

## 2017-02-18 PROCEDURE — 36415 COLL VENOUS BLD VENIPUNCTURE: CPT | Performed by: INTERNAL MEDICINE

## 2017-02-18 PROCEDURE — 74011000250 HC RX REV CODE- 250: Performed by: EMERGENCY MEDICINE

## 2017-02-18 PROCEDURE — 74011250636 HC RX REV CODE- 250/636: Performed by: EMERGENCY MEDICINE

## 2017-02-18 PROCEDURE — 74011250637 HC RX REV CODE- 250/637: Performed by: INTERNAL MEDICINE

## 2017-02-18 PROCEDURE — 80048 BASIC METABOLIC PNL TOTAL CA: CPT | Performed by: INTERNAL MEDICINE

## 2017-02-18 PROCEDURE — 85025 COMPLETE CBC W/AUTO DIFF WBC: CPT | Performed by: INTERNAL MEDICINE

## 2017-02-18 PROCEDURE — 85018 HEMOGLOBIN: CPT | Performed by: INTERNAL MEDICINE

## 2017-02-18 PROCEDURE — 74011000250 HC RX REV CODE- 250: Performed by: INTERNAL MEDICINE

## 2017-02-18 PROCEDURE — 65660000000 HC RM CCU STEPDOWN

## 2017-02-18 PROCEDURE — C9113 INJ PANTOPRAZOLE SODIUM, VIA: HCPCS | Performed by: EMERGENCY MEDICINE

## 2017-02-18 RX ORDER — POTASSIUM CHLORIDE 750 MG/1
30 TABLET, FILM COATED, EXTENDED RELEASE ORAL
Status: COMPLETED | OUTPATIENT
Start: 2017-02-18 | End: 2017-02-18

## 2017-02-18 RX ADMIN — SUCRALFATE 1 G: 1 TABLET ORAL at 17:25

## 2017-02-18 RX ADMIN — SODIUM CHLORIDE 40 MG: 9 INJECTION INTRAMUSCULAR; INTRAVENOUS; SUBCUTANEOUS at 10:51

## 2017-02-18 RX ADMIN — SUCRALFATE 1 G: 1 TABLET ORAL at 12:49

## 2017-02-18 RX ADMIN — LATANOPROST 1 DROP: 50 SOLUTION OPHTHALMIC at 09:00

## 2017-02-18 RX ADMIN — ACETAMINOPHEN 650 MG: 325 TABLET, FILM COATED ORAL at 04:26

## 2017-02-18 RX ADMIN — SUCRALFATE 1 G: 1 TABLET ORAL at 21:40

## 2017-02-18 RX ADMIN — POTASSIUM CHLORIDE 30 MEQ: 750 TABLET, FILM COATED, EXTENDED RELEASE ORAL at 12:49

## 2017-02-18 RX ADMIN — SODIUM CHLORIDE 40 MG: 9 INJECTION INTRAMUSCULAR; INTRAVENOUS; SUBCUTANEOUS at 21:40

## 2017-02-18 RX ADMIN — BRIMONIDINE TARTRATE 1 DROP: 2 SOLUTION/ DROPS OPHTHALMIC at 09:00

## 2017-02-18 RX ADMIN — BRIMONIDINE TARTRATE 1 DROP: 2 SOLUTION/ DROPS OPHTHALMIC at 16:00

## 2017-02-18 RX ADMIN — Medication 10 ML: at 21:40

## 2017-02-18 RX ADMIN — ATORVASTATIN CALCIUM 20 MG: 20 TABLET, FILM COATED ORAL at 21:40

## 2017-02-18 RX ADMIN — BRIMONIDINE TARTRATE 1 DROP: 2 SOLUTION/ DROPS OPHTHALMIC at 21:41

## 2017-02-18 RX ADMIN — LEVOTHYROXINE SODIUM 125 MCG: 125 TABLET ORAL at 10:51

## 2017-02-18 RX ADMIN — SUCRALFATE 1 G: 1 TABLET ORAL at 10:51

## 2017-02-18 NOTE — PROGRESS NOTES
PCU SHIFT NURSING NOTE      Bedside shift change report given to Ruddy Salcido (oncoming nurse) by Darrel Cornejo RN (offgoing nurse). Report included the following information SBAR, Kardex, Procedure Summary, Intake/Output, MAR and Recent Results. Shift Summary:   2000: First unit of blood completed at this time. VSS and no complaints of pain at this time. 2030: Pharmacy came up with eyedrops stating they were unaware that there were 2 eyedrops. Will inform MD in morning to place order so they can make a label for the other eyedrops. 2243: Second unit of blood started at 75 mL/hr.     2258: Vitals remain stable, rate increased to 125 mL/hr on blood. 0430: Patient flushed toilet before I could see stool, patient states it was still dark. VSS. Medicated with Tylenol for headache. Right AC PIV accidentally pulled out by patient, was found in bed. Dressing changed on other PIV. Admission database completed with patient. Labs sent. 0119: Blood transfusion completed at this time. Will draw CBC in 2 hours. Admission Date 2/16/2017   Admission Diagnosis Upper GI bleeding  GI bleed   Consults IP CONSULT TO GASTROENTEROLOGY        Consults   []PT   []OT   []Speech   []Case Management      [] Palliative      Cardiac Monitoring Order   []Yes   []No     IV drips   []Yes    Drip:                            Dose:  Drip:                            Dose:  Drip:                            Dose:   []No     GI Prophylaxis   []Yes   []No         DVT Prophylaxis   SCDs:             Reagan stockings:         [] Medication   []Contraindicated   []None      Activity Level Activity Level: Up with Assistance     Activity Assistance: Partial (two people)   Purposeful Rounding every 1-2 hour?    []Yes   Mclain Score  Total Score: 3   Bed Alarm (If score 3 or >)   []Yes   [] Refused (See signed refusal form in chart)   Salvatore Score  Salvatore Score: 20   Salvatore Score (if score 14 or less)   []PMT consult   []Wound Care consult []Specialty bed   [] Nutrition consult          Needs prior to discharge:   Home O2 required:    []Yes   []No    If yes, how much O2 required?     Other:    Last Bowel Movement: Last Bowel Movement Date: 02/16/17      Influenza Vaccine          Pneumonia Vaccine           Diet Active Orders   Diet    DIET NPO Except Meds      LDAs               Peripheral IV 02/16/17 Left Antecubital (Active)   Site Assessment Clean, dry, & intact 2/17/2017  5:04 PM   Phlebitis Assessment 0 2/17/2017  5:04 PM   Infiltration Assessment 0 2/17/2017  5:04 PM   Dressing Status Clean, dry, & intact 2/17/2017  5:04 PM   Dressing Type Tape;Transparent 2/17/2017  5:04 PM   Hub Color/Line Status Pink 2/17/2017  5:04 PM       Peripheral IV 02/17/17 Right Antecubital (Active)   Site Assessment Clean, dry, & intact 2/17/2017  5:04 PM   Phlebitis Assessment 0 2/17/2017  5:04 PM   Infiltration Assessment 0 2/17/2017  5:04 PM   Dressing Status Clean, dry, & intact 2/17/2017  5:04 PM   Dressing Type Tape;Transparent 2/17/2017  5:04 PM   Hub Color/Line Status Pink 2/17/2017  5:04 PM   Action Taken Blood drawn 2/17/2017 11:44 AM                      Urinary Catheter      Intake & Output   Date 02/17/17 0700 - 02/18/17 0659 02/18/17 0700 - 02/19/17 0659   Shift 1373-3036 5002-5875 24 Hour Total 8849-5664 4261-0140 24 Hour Total   I  N  T  A  K  E   I.V.  (mL/kg/hr) 200  (0.3)  200         Volume (0.9% sodium chloride infusion) 200  200       Blood  605.8 605.8         Volume (TRANSFUSE PACKED RBC'S)  293.3 293.3         Volume (TRANSFUSE PACKED RBC'S)  312.5 312.5       Shift Total  (mL/kg) 200  (3.9) 605.8  (11.7) 805.8  (15.6)      O  U  T  P  U  T   Urine  (mL/kg/hr)  150 150         Urine Voided  150 150       Shift Total  (mL/kg)  150  (2.9) 150  (2.9)       455.8 655.8      Weight (kg) 51.7 51.7 51.7 51.7 51.7 51.7         Readmission Risk Assessment Tool Score Medium Risk            14       Total Score        4 More than 1 Admission in calendar year    5 Patient Insurance is Medicare, Medicaid or Self Pay    5 Charlson Comorbidity Score        Criteria that do not apply:    Relationship with PCP    Patient Living Status    Patient Length of Stay > 5       Expected Length of Stay 2d 4h   Actual Length of Stay 1

## 2017-02-18 NOTE — PROGRESS NOTES
Latosha Royal [address] M.D.  [phone]               GASTROENTEROLOGY PROGRESS NOTE        NAME: Mehrdad Noel   :  1934   MRN:  [id number]       Subjective:   Less melena. No abdominal pain. Objective:   VITALS:   Last 24hrs VS reviewed. Most recent are:  Visit Vitals    /54 (BP Patient Position: Standing)    Pulse 100    Temp 98.2 °F (36.8 °C)    Resp 18    Ht 5' (1.524 m)    Wt 51.7 kg (114 lb)    SpO2 100%    BMI 22.26 kg/m2       Intake/Output Summary (Last 24 hours) at 17 1544  Last data filed at 17 1336   Gross per 24 hour   Intake           1325.8 ml   Output              550 ml   Net            775.8 ml       PHYSICAL EXAM:  General: Alert, in no acute distress    Lungs:            CTA Bilaterally  Heart:  Normal S1, S2    Abdomen: Soft, Non distended, Non tender. Normoactive bowel sounds, no HSM,   no rebound/guarding  MSK:   Poor muscle tone  Skin:   Warm to touch  Psych:   Good insight. Not anxious nor agitated. Lab Data Reviewed:   Recent Labs      17   1440  17   WBC   --   7.0   --   6.7   HGB  9.2*  10.0*   < >  7.8*   HCT  26.7*  28.9*   < >  22.8*   PLT   --   105*   --   107*    < > = values in this interval not displayed. Recent Labs      17   NA  144  145   K  3.4*  3.6   CL  111*  111*   CO2  23  25   BUN  8  22*   CREA  0.65  0.53*   GLU  105*  84   CA  7.7*  7.8*     Recent Labs      17   194   SGOT  14*   AP  64   TP  5.7*   ALB  3.0*   GLOB  2.7     Recent Labs      17   INR  1.1   PTP  11.5*      No results for input(s): FE, TIBC, PSAT, FERR in the last 72 hours. No results for input(s): CPK, CKMB in the last 72 hours.     No lab exists for component: TOPONINI    See Electronic Medical Record for all procedure/radiology reports and details which were not copied into this note but were reviewed prior to the creation of the Plan.    Assessment:   · Gastric ulcer with stigmata of recent hemorrhage s/p treatment. Less bleeding clinically and normalization of BUN/Cr ratio. 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

## 2017-02-18 NOTE — PROGRESS NOTES
Hospitalist Progress Note    NAME: [place]   :  1934   MRN:  [id number]     Upper GI bleeding (2017) POA suspect bleeding from a biopsy site  Recent EGD by Dr Tory Singh on 2017, normal by her report, 2 biopsies done  Sunny Pyle melena on rectal exam in ED, no further BM since arriving in the ED    IV PPI acid suppression and add carafate QID  Serial HgBs, transfused yesterday  Hold ASA and BP meds  Clear liquid diet ordered this AM    S/p EGD: Impression:   -Normal esophagus  -Single shallow gastric antral 2mm ulceration without vessel; injected with 1 cc Epi 1:07677 mix with blanching  -Single shallow gastric incisural ulceration 4mm diameter with central red visible vessel; injected with 2cc Epi 1:33448 mix with blanching, followed by placement of 2 hemoclips across visible vessel  -Normal duodenum     Syncope POA suspect was due to hypovolemia  Orthostasis POA in ED  Suspect due to hypovolemia with the bleeding,  Monitor orthostatics  IVF - may be able to stop if eating well and orthostatics neg     Essential HTN POA    Cont to Hold lisinopril-HCTZ, decrease IVF for now  PRN hydralazine   Hypothyroidism POA  Continue replacement     Thoracic aortic aneurysm POA   Glaucoma POA continue eye drops     DVT prophylaxis: SCDs  Code status: Full code     CHIEF COMPLAINT: I passed out today and had a black stool     HISTORY OF PRESENT ILLNESS: [facility] 70[facility]   [facility] who recently has been having problems with upper abdominal   pain. Her sister in the room notes she has also had a very poor   appetite. She may have lost several pounds over the past few months. She underwent an endoscopy without Dr. Vesta Lewis on 2017, which   per patient report was normal. The patient reports 2 biopsies were   done and then she was discharged to home. She initially did fine then   the next day she began to feel \"dizzy and sick. \" She began to feel very   lightheaded when she would stand.  At times she felt like she had   \"motion sickness\" when she tried to stand up. When she laid down, she   felt fine. Her bowel movements were initially normal, then said on the   next day on Thursday she had a syncopal episode. She felt the urge to   move her bowels. She was partially bent over trying to lift up to get the   toilet seat down, when the next thing she remembers she woke up on   the floor. She had lost control of her bowels while she was out and she   had a very large black bowel movement. She continued to feel   lightheaded and dizzy so she decided to come to the emergency room. She did have some mild periumbilical abdominal pain, cramping that is   now resolved. She denied any nausea, vomiting or hematemesis. No   bright red blood per rectum, no chest pain, new shortness of breath,   headaches. She has felt diffusely weak and tired and has felt cold in   the emergency room.       In the emergency room, she was borderline hypotensive with blood   pressures as low as 100/50. She has not had any further bowel   movements. Rectal exam by the emergency room physician revealed   black melenic stool that was heme-positive. Her hemoglobin came   back at 9.2. We do not have any baseline hemoglobins in the past 5   years. We were called to admit the patient. Her BUN was increased to 28.           Subjective: says she is feeling much better than when she came in -last BM this AM with black stool     Review of Systems:  Symptom Y/N Comments  Symptom Y/N Comments   Fever/Chills    Chest Pain n    Poor Appetite    Edema     Cough    Abdominal Pain n    Sputum    Joint Pain     SOB/BECKETT    Pruritis/Rash     Nausea/vomit n   Tolerating PT/OT     Diarrhea    Tolerating Diet     Constipation n   Other       Could NOT obtain due to:      Objective:     VITALS:   Last 24hrs VS reviewed since prior progress note.  Most recent are:  Patient Vitals for the past 24 hrs:   Temp Pulse Resp BP SpO2   02/18/17 0745 98.3 °F (36.8 °C) 72 18 130/49 100 %   02/18/17 0434 98 °F (36.7 °C) 77 19 143/84 95 %   02/18/17 0106 97.9 °F (36.6 °C) 71 20 119/54 96 %   02/17/17 2345 98.9 °F (37.2 °C) 66 19 106/47 98 %   02/17/17 2317 98.2 °F (36.8 °C) 71 20 107/42 100 %   02/17/17 2259 98.8 °F (37.1 °C) 67 19 128/58 100 %   02/17/17 2224 98.2 °F (36.8 °C) 68 18 116/42 100 %   02/17/17 1930 98.3 °F (36.8 °C) 66 17 101/53 99 %   02/17/17 1830 98.2 °F (36.8 °C) 69 16 126/49 100 %   02/17/17 1801 99.2 °F (37.3 °C) 73 16 112/47 100 %   02/17/17 1730 99.1 °F (37.3 °C) 69 16 111/48 100 %   02/17/17 1715 98.3 °F (36.8 °C) 68 16 101/52 100 %   02/17/17 1659 98.2 °F (36.8 °C) 66 16 104/44 100 %   02/17/17 1644 - 72 16 132/50 99 %   02/17/17 1621 - 67 17 155/72 99 %   02/17/17 1612 - 65 17 156/71 98 %   02/17/17 1610 - 66 16 156/71 98 %   02/17/17 1608 - - 16 - -   02/17/17 1605 - 65 12 123/67 98 %   02/17/17 1600 - 66 15 141/81 100 %   02/17/17 1558 - 65 16 141/81 98 %   02/17/17 1555 - 64 15 137/77 100 %   02/17/17 1552 - 63 24 134/83 100 %   02/17/17 1549 - 69 13 159/51 100 %   02/17/17 1546 - 65 11 129/67 100 %   02/17/17 1543 - 72 14 128/61 100 %   02/17/17 1540 - 65 15 128/61 -   02/17/17 1537 - 65 16 131/67 99 %   02/17/17 1534 - 64 15 137/65 100 %   02/17/17 1531 - 66 24 148/68 100 %   02/17/17 1529 - 66 24 158/73 99 %   02/17/17 1451 98.5 °F (36.9 °C) 75 17 112/58 100 %   02/17/17 1406 - 82 - - 100 %   02/17/17 1404 - - - 154/69 -   02/17/17 1300 - 78 - 127/64 100 %   02/17/17 1230 - 75 - (!) 147/98 100 %   02/17/17 1201 - 74 - 145/68 100 %       Intake/Output Summary (Last 24 hours) at 02/18/17 1135  Last data filed at 02/18/17 0119   Gross per 24 hour   Intake            805.8 ml   Output              150 ml   Net            655.8 ml        PHYSICAL EXAM:  General: WD, WN. Alert, cooperative, no acute distress    EENT:  EOMI. Anicteric sclerae. MMM, +glasses  Resp:  CTA bilaterally, no wheezing or rales.   No accessory muscle use  CV:  Regular  rhythm,  No edema  GI:  Soft, Non distended, Non tender.  +Bowel sounds  Neurologic:  Alert and oriented X 3, normal speech,   Psych:   Good insight. Not anxious nor agitated  Skin:     No jaundice    Reviewed most current lab test results and cultures  YES  Reviewed most current radiology test results   YES  Review and summation of old records today    NO  Reviewed patient's current orders and MAR    YES  PMH/SH reviewed - no change compared to H&P  ________________________________________________________________________  Care Plan discussed with:    Comments   Patient x    Family  x sister   RN     Care Manager     Consultant                        Multidiciplinary team rounds were held today with , nursing, pharmacist and clinical coordinator. Patient's plan of care was discussed; medications were reviewed and discharge planning was addressed. ________________________________________________________________________  Total NON critical care TIME:      Minutes    Total CRITICAL CARE TIME Spent:   Minutes non procedure based      Comments   >50% of visit spent in counseling and coordination of care     ________________________________________________________________________  Mirtha Acevedo MD     Procedures: see electronic medical records for all procedures/Xrays and details which were not copied into this note but were reviewed prior to creation of Plan. LABS:  I reviewed today's most current labs and imaging studies.   Pertinent labs include:  Recent Labs      02/18/17   0442  02/17/17   0840  02/17/17   0419  02/16/17   1942   WBC  7.0   --   6.7  8.4   HGB  10.0*  7.2*  7.8*  9.2*   HCT  28.9*  21.0*  22.8*  27.0*   PLT  105*   --   107*  123*     Recent Labs      02/18/17   0442  02/17/17 0419  02/16/17 1942   NA  144  145  143   K  3.4*  3.6  3.6   CL  111*  111*  109*   CO2  23  25  24   GLU  105*  84  110*   BUN  8  22*  28*   CREA  0.65  0.53*  0.65   CA  7.7*  7.8*  7.8*   ALB   --    -- 3.0*   TBILI   --    --   0.3   SGOT   --    --   14*   ALT   --    --   16   INR   --   1.1   --        Signed: Melani Gray MD

## 2017-02-18 NOTE — PROGRESS NOTES
Primary Nurse Jesus Woods RN and Angie Cosby RN performed a dual skin assessment on this patient No impairment noted  Salvatore score is 20

## 2017-02-19 LAB
ANION GAP BLD CALC-SCNC: 10 MMOL/L (ref 5–15)
BASOPHILS # BLD AUTO: 0 K/UL (ref 0–0.1)
BASOPHILS # BLD: 0 % (ref 0–1)
BUN SERPL-MCNC: 4 MG/DL (ref 6–20)
BUN/CREAT SERPL: 8 (ref 12–20)
CALCIUM SERPL-MCNC: 7.8 MG/DL (ref 8.5–10.1)
CHLORIDE SERPL-SCNC: 110 MMOL/L (ref 97–108)
CO2 SERPL-SCNC: 24 MMOL/L (ref 21–32)
CREAT SERPL-MCNC: 0.52 MG/DL (ref 0.55–1.02)
EOSINOPHIL # BLD: 0.4 K/UL (ref 0–0.4)
EOSINOPHIL NFR BLD: 7 % (ref 0–7)
ERYTHROCYTE [DISTWIDTH] IN BLOOD BY AUTOMATED COUNT: 15.1 % (ref 11.5–14.5)
GLUCOSE SERPL-MCNC: 81 MG/DL (ref 65–100)
HCT VFR BLD AUTO: 27.4 % (ref 35–47)
HCT VFR BLD AUTO: 29.3 % (ref 35–47)
HGB BLD-MCNC: 10 G/DL (ref 11.5–16)
HGB BLD-MCNC: 9.4 G/DL (ref 11.5–16)
LYMPHOCYTES # BLD AUTO: 28 % (ref 12–49)
LYMPHOCYTES # BLD: 1.5 K/UL (ref 0.8–3.5)
MAGNESIUM SERPL-MCNC: 1.9 MG/DL (ref 1.6–2.4)
MCH RBC QN AUTO: 29 PG (ref 26–34)
MCHC RBC AUTO-ENTMCNC: 34.3 G/DL (ref 30–36.5)
MCV RBC AUTO: 84.6 FL (ref 80–99)
MONOCYTES # BLD: 0.6 K/UL (ref 0–1)
MONOCYTES NFR BLD AUTO: 10 % (ref 5–13)
NEUTS SEG # BLD: 3 K/UL (ref 1.8–8)
NEUTS SEG NFR BLD AUTO: 55 % (ref 32–75)
PLATELET # BLD AUTO: 115 K/UL (ref 150–400)
POTASSIUM SERPL-SCNC: 3.6 MMOL/L (ref 3.5–5.1)
RBC # BLD AUTO: 3.24 M/UL (ref 3.8–5.2)
SODIUM SERPL-SCNC: 144 MMOL/L (ref 136–145)
WBC # BLD AUTO: 5.5 K/UL (ref 3.6–11)

## 2017-02-19 PROCEDURE — 80048 BASIC METABOLIC PNL TOTAL CA: CPT | Performed by: INTERNAL MEDICINE

## 2017-02-19 PROCEDURE — 74011250637 HC RX REV CODE- 250/637: Performed by: INTERNAL MEDICINE

## 2017-02-19 PROCEDURE — 74011000250 HC RX REV CODE- 250: Performed by: INTERNAL MEDICINE

## 2017-02-19 PROCEDURE — 74011250636 HC RX REV CODE- 250/636: Performed by: INTERNAL MEDICINE

## 2017-02-19 PROCEDURE — 36415 COLL VENOUS BLD VENIPUNCTURE: CPT | Performed by: INTERNAL MEDICINE

## 2017-02-19 PROCEDURE — C9113 INJ PANTOPRAZOLE SODIUM, VIA: HCPCS | Performed by: EMERGENCY MEDICINE

## 2017-02-19 PROCEDURE — 65660000000 HC RM CCU STEPDOWN

## 2017-02-19 PROCEDURE — 74011250636 HC RX REV CODE- 250/636: Performed by: EMERGENCY MEDICINE

## 2017-02-19 PROCEDURE — 85025 COMPLETE CBC W/AUTO DIFF WBC: CPT | Performed by: INTERNAL MEDICINE

## 2017-02-19 PROCEDURE — 83735 ASSAY OF MAGNESIUM: CPT | Performed by: INTERNAL MEDICINE

## 2017-02-19 PROCEDURE — 74011000250 HC RX REV CODE- 250: Performed by: EMERGENCY MEDICINE

## 2017-02-19 PROCEDURE — 85018 HEMOGLOBIN: CPT | Performed by: INTERNAL MEDICINE

## 2017-02-19 RX ORDER — LATANOPROST 50 UG/ML
1 SOLUTION/ DROPS OPHTHALMIC DAILY
Status: DISCONTINUED | OUTPATIENT
Start: 2017-02-19 | End: 2017-02-20 | Stop reason: HOSPADM

## 2017-02-19 RX ADMIN — Medication 10 ML: at 21:29

## 2017-02-19 RX ADMIN — SUCRALFATE 1 G: 1 TABLET ORAL at 14:26

## 2017-02-19 RX ADMIN — SODIUM CHLORIDE 40 MG: 9 INJECTION INTRAMUSCULAR; INTRAVENOUS; SUBCUTANEOUS at 10:02

## 2017-02-19 RX ADMIN — SUCRALFATE 1 G: 1 TABLET ORAL at 10:02

## 2017-02-19 RX ADMIN — BRIMONIDINE TARTRATE 1 DROP: 2 SOLUTION/ DROPS OPHTHALMIC at 10:05

## 2017-02-19 RX ADMIN — SODIUM CHLORIDE 75 ML/HR: 900 INJECTION, SOLUTION INTRAVENOUS at 05:05

## 2017-02-19 RX ADMIN — Medication 10 ML: at 03:32

## 2017-02-19 RX ADMIN — LATANOPROST 1 DROP: 50 SOLUTION OPHTHALMIC at 21:33

## 2017-02-19 RX ADMIN — ATORVASTATIN CALCIUM 20 MG: 20 TABLET, FILM COATED ORAL at 21:27

## 2017-02-19 RX ADMIN — SUCRALFATE 1 G: 1 TABLET ORAL at 21:27

## 2017-02-19 RX ADMIN — SODIUM CHLORIDE 40 MG: 9 INJECTION INTRAMUSCULAR; INTRAVENOUS; SUBCUTANEOUS at 21:26

## 2017-02-19 RX ADMIN — ACETAMINOPHEN 650 MG: 325 TABLET, FILM COATED ORAL at 19:09

## 2017-02-19 RX ADMIN — SUCRALFATE 1 G: 1 TABLET ORAL at 18:09

## 2017-02-19 RX ADMIN — LEVOTHYROXINE SODIUM 125 MCG: 125 TABLET ORAL at 06:44

## 2017-02-19 RX ADMIN — ACETAMINOPHEN 650 MG: 325 TABLET, FILM COATED ORAL at 02:51

## 2017-02-19 RX ADMIN — Medication 10 ML: at 14:26

## 2017-02-19 NOTE — ROUTINE PROCESS
Bedside and Verbal shift change report given to Tristian Maharaj (oncoming nurse) by Jaida Lucio (offgoing nurse). Report included the following information SBAR, Kardex, Intake/Output, MAR, Recent Results and Cardiac Rhythm NSR.

## 2017-02-19 NOTE — PROGRESS NOTES
Uneventful shift, small black BM noted at change of shift, H/H due during this shift, will monitor.       2330 - Report given

## 2017-02-19 NOTE — PROGRESS NOTES
[facility] [address] M.D.  [phone]               GASTROENTEROLOGY PROGRESS NOTE        NAME: Lady Amor   :  1934   MRN:  [id number]       No BM since yesterday AM. No abdominal pain. Feels well. No nausea. Objective:   VITALS:   Last 24hrs VS reviewed. Most recent are:  Visit Vitals    /54 (BP 1 Location: Right arm, BP Patient Position: At rest)    Pulse 60    Temp 98 °F (36.7 °C)    Resp 18    Ht 5' (1.524 m)    Wt 51.7 kg (114 lb)    SpO2 99%    BMI 22.26 kg/m2       Intake/Output Summary (Last 24 hours) at 17 1647  Last data filed at 17 1455   Gross per 24 hour   Intake              125 ml   Output              400 ml   Net             -275 ml       PHYSICAL EXAM:  General: Alert, in no acute distress    Lungs:            CTA Bilaterally  Heart:  Normal S1, S2    Abdomen: Soft, Non distended, Non tender. Normoactive bowel sounds, no HSM, no rebound/guarding  MSK:   Poor muscle tone  Skin:   Warm to touch  Psych:   Good insight. Not anxious nor agitated. Lab Data Reviewed:   Recent Labs      17   1235  178   17   WBC   --   5.5   --   7.0   HGB  10.0*  9.4*   < >  10.0*   HCT  29.3*  27.4*   < >  28.9*   PLT   --   115*   --   105*    < > = values in this interval not displayed. Recent Labs      17   03117   NA  144  144   K  3.6  3.4*   CL  110*  111*   CO2  24  23   BUN  4*  8   CREA  0.52*  0.65   GLU  81  105*   CA  7.8*  7.7*   MG  1.9   --      Recent Labs      17   SGOT  14*   AP  64   TP  5.7*   ALB  3.0*   GLOB  2.7     Recent Labs      17   INR  1.1   PTP  11.5*      No results for input(s): FE, TIBC, PSAT, FERR in the last 72 hours. No results for input(s): CPK, CKMB in the last 72 hours.     No lab exists for component: MIREYA    See Electronic Medical Record for all procedure/radiology reports and details which were not copied into this note but were reviewed prior to the creation of the Plan. Assessment:   · Gastric ulcer with stigmata of recent hemorrhage s/p treatment. Hgb improved and no BM or BUN elevation suggests bleeding has stopped. 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

## 2017-02-19 NOTE — ROUTINE PROCESS
Bedside and Verbal shift change report given to Daniela Mejía (oncoming nurse) by Vidya Aldana (offgoing nurse). Report included the following information SBAR, Kardex, Intake/Output, MAR, Recent Results and Cardiac Rhythm NSR.

## 2017-02-19 NOTE — PROGRESS NOTES
Hospitalist Progress Note    NAME: [place]   :  1934   MRN:  [id number]     Upper GI bleeding (2017) POA suspect bleeding from a biopsy site  Recent EGD by Dr Bo Carpenter on 2017, normal by her report, 2 biopsies done  Pippa Kyle melena on rectal exam in ED, no further BM since arriving in the ED    IV PPI acid suppression and add carafate QID  Serial HgBs, transfused  - stable hgb  Hold ASA and BP meds  Clear liquid diet     S/p EGD: Impression:   -Normal esophagus  -Single shallow gastric antral 2mm ulceration without vessel; injected with 1 cc Epi 1:90389 mix with blanching  -Single shallow gastric incisural ulceration 4mm diameter with central red visible vessel; injected with 2cc Epi 1:30258 mix with blanching, followed by placement of 2 hemoclips across visible vessel  -Normal duodenum     Syncope POA suspect was due to hypovolemia  Orthostasis POA in ED  Suspect due to hypovolemia with the bleeding, Neg orthostatics  S/p iVF     Essential HTN POA    Cont to Hold lisinopril-HCTZ  PRN hydralazine   Hypothyroidism POA  Continue replacement     Thoracic aortic aneurysm POA   Glaucoma POA continue eye drops     DVT prophylaxis: SCDs  Code status: Full code     CHIEF COMPLAINT: I passed out today and had a black stool     HISTORY OF PRESENT ILLNESS: An 80-year-old    female who recently has been having problems with upper abdominal   pain. Her sister in the room notes she has also had a very poor   appetite. She may have lost several pounds over the past few months. She underwent an endoscopy without Dr. Elana Estevez on 2017, which   per patient report was normal. The patient reports 2 biopsies were   done and then she was discharged to home. She initially did fine then   the next day she began to feel \"dizzy and sick. \" She began to feel very   lightheaded when she would stand. At times she felt like she had   \"motion sickness\" when she tried to stand up.  When she laid down, she   felt fine. Her bowel movements were initially normal, then said on the   next day on Thursday she had a syncopal episode. She felt the urge to   move her bowels. She was partially bent over trying to lift up to get the   toilet seat down, when the next thing she remembers she woke up on   the floor. She had lost control of her bowels while she was out and she   had a very large black bowel movement. She continued to feel   lightheaded and dizzy so she decided to come to the emergency room. She did have some mild periumbilical abdominal pain, cramping that is   now resolved. She denied any nausea, vomiting or hematemesis. No   bright red blood per rectum, no chest pain, new shortness of breath,   headaches. She has felt diffusely weak and tired and has felt cold in   the emergency room.       In the emergency room, she was borderline hypotensive with blood   pressures as low as 100/50. She has not had any further bowel   movements. Rectal exam by the emergency room physician revealed   black melenic stool that was heme-positive. Her hemoglobin came   back at 9.2. We do not have any baseline hemoglobins in the past 5   years. We were called to admit the patient. Her BUN was increased to 28.           Subjective: says last BM was yesterday and was black - no BM since then     Review of Systems:  Symptom Y/N Comments  Symptom Y/N Comments   Fever/Chills    Chest Pain n    Poor Appetite    Edema     Cough    Abdominal Pain n    Sputum    Joint Pain     SOB/BECKETT    Pruritis/Rash     Nausea/vomit n   Tolerating PT/OT     Diarrhea    Tolerating Diet     Constipation n   Other       Could NOT obtain due to:      Objective:     VITALS:   Last 24hrs VS reviewed since prior progress note.  Most recent are:  Patient Vitals for the past 24 hrs:   Temp Pulse Resp BP SpO2   02/19/17 1116 98 °F (36.7 °C) 60 18 109/54 99 %   02/19/17 0743 98.1 °F (36.7 °C) 69 18 152/72 99 %   02/19/17 0326 98.2 °F (36.8 °C) 60 16 141/56 100 %   02/19/17 0008 98.6 °F (37 °C) 60 16 117/54 99 %   02/18/17 1959 98.1 °F (36.7 °C) 66 16 156/64 97 %   02/18/17 1534 98.3 °F (36.8 °C) 67 18 147/62 100 %       Intake/Output Summary (Last 24 hours) at 02/19/17 1222  Last data filed at 02/19/17 0800   Gross per 24 hour   Intake              485 ml   Output                0 ml   Net              485 ml        PHYSICAL EXAM:  General: WD, WN. Alert, cooperative, no acute distress    EENT:  EOMI. Anicteric sclerae. MMM, +glasses  Resp:  CTA bilaterally, no wheezing or rales. No accessory muscle use  CV:  Regular  rhythm,  No edema  GI:  Soft, Non distended, Non tender.  +Bowel sounds  Neurologic:  Alert and oriented X 3, normal speech,   Psych:   Good insight. Not anxious nor agitated  Skin:     No jaundice    Reviewed most current lab test results and cultures  YES  Reviewed most current radiology test results   YES  Review and summation of old records today    NO  Reviewed patient's current orders and MAR    YES  PMH/SH reviewed - no change compared to H&P  ________________________________________________________________________  Care Plan discussed with:    Comments   Patient x    Family  x son   RN     Care Manager     Consultant                        Multidiciplinary team rounds were held today with , nursing, pharmacist and clinical coordinator. Patient's plan of care was discussed; medications were reviewed and discharge planning was addressed.      ________________________________________________________________________  Total NON critical care TIME:      Minutes    Total CRITICAL CARE TIME Spent:   Minutes non procedure based      Comments   >50% of visit spent in counseling and coordination of care     ________________________________________________________________________  Jethro Arriaga MD     Procedures: see electronic medical records for all procedures/Xrays and details which were not copied into this note but were reviewed prior to creation of Plan. LABS:  I reviewed today's most current labs and imaging studies. Pertinent labs include:  Recent Labs      02/19/17 0318 02/18/17 2245 02/18/17 1440 02/18/17 0442 02/17/17 0419   WBC  5.5   --    --   7.0   --   6.7   HGB  9.4*  8.6*  9.2*  10.0*   < >  7.8*   HCT  27.4*  25.1*  26.7*  28.9*   < >  22.8*   PLT  115*   --    --   105*   --   107*    < > = values in this interval not displayed.      Recent Labs      02/19/17 0318 02/18/17 0442 02/17/17 0419 02/16/17 1942   NA  144  144  145  143   K  3.6  3.4*  3.6  3.6   CL  110*  111*  111*  109*   CO2  24  23  25  24   GLU  81  105*  84  110*   BUN  4*  8  22*  28*   CREA  0.52*  0.65  0.53*  0.65   CA  7.8*  7.7*  7.8*  7.8*   MG  1.9   --    --    --    ALB   --    --    --   3.0*   TBILI   --    --    --   0.3   SGOT   --    --    --   14*   ALT   --    --    --   16   INR   --    --   1.1   --        Signed: Imtiaz Ibrahim MD

## 2017-02-19 NOTE — PROGRESS NOTES
PCU SHIFT NURSING NOTE    0730 Bedside and Verbal shift change report given to Leta Marrero RN (oncoming nurse) by Karo Chavarria (offgoing nurse). Report included the following information SBAR, Kardex, Intake/Output, MAR, Accordion, Recent Results, Med Rec Status and Cardiac Rhythm NSR. Shift Summary:     1233 CIVF d/c'd as per order. [place] Order from Dr. Natividad Castillo to check with GI r/t advancing diet acknowledged. GI gene, Dr. Nupur Gong on call. 0 Dr. Nupur Gong returned page, update on pt condition, states he will advance diet order. 1915 Bedside and Verbal shift change report given to [address] [place] [address] (oncoming nurse) by Leta Marrero RN (offgoing nurse). Report included the following information SBAR, Kardex, Intake/Output, MAR, Accordion, Recent Results, Med Rec Status and Cardiac Rhythm NSR. Admission Date 2/16/2017   Admission Diagnosis Upper GI bleeding  GI bleed   Consults IP CONSULT TO GASTROENTEROLOGY        Consults   []PT   []OT   []Speech   []Case Management      [] Palliative      Cardiac Monitoring Order   [x]Yes   []No     IV drips   []Yes    Drip:                            Dose:  Drip:                            Dose:  Drip:                            Dose:   [x]No     GI Prophylaxis   [x]Yes   []No         DVT Prophylaxis   SCDs:             Reagan stockings:         [] Medication   [x]Contraindicated   []None      Activity Level Activity Level: Up with Assistance     Activity Assistance: Partial (one person)   Purposeful Rounding every 1-2 hour? [x]Yes   Mclain Score  Total Score: 4   Bed Alarm (If score 3 or >)   [x]Yes   [] Refused (See signed refusal form in chart)   Salvatore Score  Salvatore Score: 21   Salvatore Score (if score 14 or less)   []PMT consult   []Wound Care consult      []Specialty bed   [] Nutrition consult          Needs prior to discharge:   Home O2 required:    []Yes   [x]No    If yes, how much O2 required?     Other:    Last Bowel Movement: Last Bowel Movement Date: 02/18/17 Influenza Vaccine Received Flu Vaccine for Current Season (usually Sept-March): No    Patient/Guardian Refused (Notify MD): Yes   Pneumonia Vaccine           Diet Active Orders   Diet    DIET GI LITE (POST SURGICAL)      LDAs               Peripheral IV 02/19/17 Left Forearm (Active)   Site Assessment Clean, dry, & intact 2/19/2017  4:50 PM   Phlebitis Assessment 0 2/19/2017  4:50 PM   Infiltration Assessment 0 2/19/2017  4:50 PM   Dressing Status Clean, dry, & intact 2/19/2017  4:50 PM   Dressing Type Transparent;Tape 2/19/2017  4:50 PM   Hub Color/Line Status Pink;Capped 2/19/2017  4:50 PM   Action Taken Open ports on tubing capped 2/19/2017  3:31 AM   Alcohol Cap Used Yes 2/19/2017  3:31 AM                      Urinary Catheter      Intake & Output   Date 02/18/17 0700 - 02/19/17 0659 02/19/17 0700 - 02/20/17 0659   Shift 1331-9643 3229-7279 24 Hour Total 0700-1859 7757-3035 24 Hour Total   I  N  T  A  K  E   P. O. 720  720 50  50      P. O. 720  720 50  50    I.V.  (mL/kg/hr)    416.3  416.3      Volume (0.9% sodium chloride infusion)    416.3  416.3    Shift Total  (mL/kg) 720  (13.9)  720  (13.9) 466.3  (9)  466.3  (9)   O  U  T  P  U  T   Urine  (mL/kg/hr) 400  (0.6)  400  (0.3) 700  700      Urine Voided 400  400 700  700      Urine Occurrence(s)    3 x  3 x    Stool            Stool Occurrence(s)    1 x  1 x    Shift Total  (mL/kg) 400  (7.7)  400  (7.7) 700  (13.5)  700  (13.5)     320 -233.8  -233.8   Weight (kg) 51.7 51.7 51.7 51.7 51.7 51.7         Readmission Risk Assessment Tool Score Medium Risk            17       Total Score        3 Relationship with PCP    4 More than 1 Admission in calendar year    5 Patient Insurance is Medicare, Medicaid or Self Pay    5 Charlson Comorbidity Score        Criteria that do not apply:    Patient Living Status    Patient Length of Stay > 5       Expected Length of Stay 2d 4h   Actual Length of Stay 2

## 2017-02-20 VITALS
WEIGHT: 114 LBS | BODY MASS INDEX: 22.38 KG/M2 | HEIGHT: 60 IN | HEART RATE: 60 BPM | RESPIRATION RATE: 18 BRPM | TEMPERATURE: 98.2 F | SYSTOLIC BLOOD PRESSURE: 138 MMHG | DIASTOLIC BLOOD PRESSURE: 58 MMHG | OXYGEN SATURATION: 100 %

## 2017-02-20 LAB
ANION GAP BLD CALC-SCNC: 8 MMOL/L (ref 5–15)
BASOPHILS # BLD AUTO: 0 K/UL (ref 0–0.1)
BASOPHILS # BLD: 0 % (ref 0–1)
BUN SERPL-MCNC: 5 MG/DL (ref 6–20)
BUN/CREAT SERPL: 9 (ref 12–20)
CALCIUM SERPL-MCNC: 8.1 MG/DL (ref 8.5–10.1)
CHLORIDE SERPL-SCNC: 109 MMOL/L (ref 97–108)
CO2 SERPL-SCNC: 29 MMOL/L (ref 21–32)
CREAT SERPL-MCNC: 0.57 MG/DL (ref 0.55–1.02)
EOSINOPHIL # BLD: 0.3 K/UL (ref 0–0.4)
EOSINOPHIL NFR BLD: 6 % (ref 0–7)
ERYTHROCYTE [DISTWIDTH] IN BLOOD BY AUTOMATED COUNT: 15.2 % (ref 11.5–14.5)
GLUCOSE SERPL-MCNC: 89 MG/DL (ref 65–100)
HCT VFR BLD AUTO: 29.3 % (ref 35–47)
HCT VFR BLD AUTO: 30.4 % (ref 35–47)
HGB BLD-MCNC: 10 G/DL (ref 11.5–16)
HGB BLD-MCNC: 10.5 G/DL (ref 11.5–16)
LYMPHOCYTES # BLD AUTO: 24 % (ref 12–49)
LYMPHOCYTES # BLD: 1.2 K/UL (ref 0.8–3.5)
MCH RBC QN AUTO: 28.9 PG (ref 26–34)
MCHC RBC AUTO-ENTMCNC: 34.1 G/DL (ref 30–36.5)
MCV RBC AUTO: 84.7 FL (ref 80–99)
MONOCYTES # BLD: 0.4 K/UL (ref 0–1)
MONOCYTES NFR BLD AUTO: 8 % (ref 5–13)
NEUTS SEG # BLD: 3.1 K/UL (ref 1.8–8)
NEUTS SEG NFR BLD AUTO: 62 % (ref 32–75)
PLATELET # BLD AUTO: 142 K/UL (ref 150–400)
POTASSIUM SERPL-SCNC: 3.6 MMOL/L (ref 3.5–5.1)
RBC # BLD AUTO: 3.46 M/UL (ref 3.8–5.2)
SODIUM SERPL-SCNC: 146 MMOL/L (ref 136–145)
WBC # BLD AUTO: 5 K/UL (ref 3.6–11)

## 2017-02-20 PROCEDURE — 97161 PT EVAL LOW COMPLEX 20 MIN: CPT

## 2017-02-20 PROCEDURE — 74011250637 HC RX REV CODE- 250/637: Performed by: INTERNAL MEDICINE

## 2017-02-20 PROCEDURE — 74011000250 HC RX REV CODE- 250: Performed by: EMERGENCY MEDICINE

## 2017-02-20 PROCEDURE — C9113 INJ PANTOPRAZOLE SODIUM, VIA: HCPCS | Performed by: EMERGENCY MEDICINE

## 2017-02-20 PROCEDURE — 36415 COLL VENOUS BLD VENIPUNCTURE: CPT | Performed by: INTERNAL MEDICINE

## 2017-02-20 PROCEDURE — G8979 MOBILITY GOAL STATUS: HCPCS

## 2017-02-20 PROCEDURE — G8978 MOBILITY CURRENT STATUS: HCPCS

## 2017-02-20 PROCEDURE — 80048 BASIC METABOLIC PNL TOTAL CA: CPT | Performed by: INTERNAL MEDICINE

## 2017-02-20 PROCEDURE — 97116 GAIT TRAINING THERAPY: CPT

## 2017-02-20 PROCEDURE — 85025 COMPLETE CBC W/AUTO DIFF WBC: CPT | Performed by: INTERNAL MEDICINE

## 2017-02-20 PROCEDURE — G8980 MOBILITY D/C STATUS: HCPCS

## 2017-02-20 PROCEDURE — 74011250636 HC RX REV CODE- 250/636: Performed by: EMERGENCY MEDICINE

## 2017-02-20 PROCEDURE — 85018 HEMOGLOBIN: CPT | Performed by: INTERNAL MEDICINE

## 2017-02-20 RX ORDER — PANTOPRAZOLE SODIUM 40 MG/1
40 TABLET, DELAYED RELEASE ORAL 2 TIMES DAILY
Qty: 60 TAB | Refills: 0 | Status: SHIPPED | OUTPATIENT
Start: 2017-02-20 | End: 2017-06-24

## 2017-02-20 RX ORDER — SUCRALFATE 1 G/1
1 TABLET ORAL 4 TIMES DAILY
Qty: 56 TAB | Refills: 0 | Status: SHIPPED | OUTPATIENT
Start: 2017-02-20 | End: 2017-03-06

## 2017-02-20 RX ADMIN — Medication 10 ML: at 05:41

## 2017-02-20 RX ADMIN — LEVOTHYROXINE SODIUM 125 MCG: 125 TABLET ORAL at 08:12

## 2017-02-20 RX ADMIN — SUCRALFATE 1 G: 1 TABLET ORAL at 08:12

## 2017-02-20 RX ADMIN — SUCRALFATE 1 G: 1 TABLET ORAL at 12:31

## 2017-02-20 RX ADMIN — SODIUM CHLORIDE 40 MG: 9 INJECTION INTRAMUSCULAR; INTRAVENOUS; SUBCUTANEOUS at 08:13

## 2017-02-20 RX ADMIN — BRIMONIDINE TARTRATE 1 DROP: 2 SOLUTION/ DROPS OPHTHALMIC at 08:16

## 2017-02-20 RX ADMIN — Medication 10 ML: at 08:15

## 2017-02-20 RX ADMIN — ACETAMINOPHEN 650 MG: 325 TABLET, FILM COATED ORAL at 00:55

## 2017-02-20 NOTE — PROGRESS NOTES
[phone]               GASTROENTEROLOGY PROGRESS NOTE        NAME: Seamus Shaikh   :  1934   MRN:  [id number]       S: No more stools. Tolerated soft diet. Objective:   VITALS:   Last 24hrs VS reviewed. Most recent are:  Visit Vitals    /80    Pulse 68    Temp 98 °F (36.7 °C)    Resp 18    Ht 5' (1.524 m)    Wt 51.7 kg (114 lb)    SpO2 99%    BMI 22.26 kg/m2       Intake/Output Summary (Last 24 hours) at 17  Last data filed at 17 0548   Gross per 24 hour   Intake           416.25 ml   Output             2500 ml   Net         -2083.75 ml       PHYSICAL EXAM:  General: Alert, in no acute distress      Abdomen: Soft, Non distended, Non tender. Lab Data Reviewed:   Recent Labs      177   178   WBC  5.0   --    --   5.5   HGB  10.0*  10.5*   < >  9.4*   HCT  29.3*  30.4*   < >  27.4*   PLT  142*   --    --   115*    < > = values in this interval not displayed. Recent Labs      17   NA  146*  144   K  3.6  3.6   CL  109*  110*   CO2  29  24   BUN  5*  4*   CREA  0.57  0.52*   GLU  89  81   CA  8.1*  7.8*   MG   --   1.9         Assessment:   · Gastric ulcer with stigmata of recent hemorrhage s/p treatment. Hgb stable.  No more melena         Plan:   · Ready for dc home  · Would give carafate 1 gm qid for 2 weeks  · Continue PPI  · Follow up with Dr. Madiha Salazar       Signed by: Claire Root MD         2017  3:44 PM

## 2017-02-20 NOTE — DISCHARGE INSTRUCTIONS
HOSPITALIST DISCHARGE INSTRUCTIONS    NAME: Julito Bray   :  1934   MRN:  [id number]     Date/Time:  2017 9:23 AM    ADMIT DATE: 2017   DISCHARGE DATE: 2017     Attending Physician: Jluis Bradshaw MD    DISCHARGE DIAGNOSIS:  Upper GI bleeding - stomach ulcer and bleeding from biopsy site  Syncope  S/p orthostasis  Essential HTN  Hypothyroidism  Glaucoma  Thoracic AA    MEDICATIONS:  See above    · It is important that you take the medication exactly as they are prescribed. · Keep your medication in the bottles provided by the pharmacist and keep a list of the medication names, dosages, and times to be taken in your wallet. · Do not take other medications without consulting your doctor. Pain Management: per above medications    What to do at Home    Recommended diet:  Cardiac Diet    Recommended activity: Activity as tolerated    Follow Up: Follow-up Information     Follow up With Details Comments Contact Info    Lawyer Dwayne MD Schedule an appointment as soon as possible for a visit in 1 week  [phone]      Larry Galeas MD   [address] [place] [address].  [phone]            If you have questions regarding the hospital related prescriptions or hospital related issues please call [facility] at . You can always direct your questions to your primary care doctor if you are unable to reach your hospital physician; your PCP works as an extension of your hospital doctor just like your hospital doctor is an extension of your PCP for your time at [facility].     If you experience any of the following symptoms then please call your primary care physician or return to the emergency room if you cannot get hold of your doctor:  Fever, chills, nausea, vomiting, diarrhea, change in mentation, falling, bleeding, shortness of breath    Additional Instructions:      Bring these papers with you to your follow up appointments. The papers will help your doctors be sure to continue the care plan from the hospital.              Information obtained by :  I understand that if any problems occur once I am at home I am to contact my physician. I understand and acknowledge receipt of the instructions indicated above.                                                                                                                                            Physician's or R.N.'s Signature                                                                  Date/Time                                                                                                                                              Patient or Representative Signature                                                          Da

## 2017-02-20 NOTE — PROGRESS NOTES
CM Initial Assessment        Current admission assessment--  Patient came in with syncopal episode. She went to the bathroom at home and woke up on the floor with fecal incontinence. She had egd done while here in the hospital. She lives in one story home with her son and daughter in law. The son works two days a week and her daughter in law is home with her. Her daughter in law does not work. Patient was independent in adl's prior to coming to the hospital.  She denies using dme. She has two steps to enter her home. She has not used home health or snf in the past. She cooks and drives. She states she takes herself to her appointments or sometimes her family will take her. Patient is being discharged to home today and states her family will be picking her up. Second medicare im letter given to patient with opportunity for questions and signed copy placed on chart. Patient states she will make her follow up appointments when she arrives home. Care Management Interventions  PCP Verified by CM: Yes  Mode of Transport at Discharge:  (Family will be taking her home. )  Physical Therapy Consult: Yes  Occupational Therapy Consult: Yes  Current Support Network:  Other (Lives with her son and daughter in law.)  Confirm Follow Up Transport: Family  Plan discussed with Pt/Family/Caregiver: Yes  Discharge Location  Discharge Placement: Home                 Nita RNBSNCRM [id number]

## 2017-02-20 NOTE — PROGRESS NOTES
physical Therapy EVALUATION/DISCHARGE  Patient: Lilia Schwarz (36 y.o. female)  Date: 2/20/2017  Primary Diagnosis: Upper GI bleeding  GI bleed  Procedure(s) (LRB):  ESOPHAGOGASTRODUODENOSCOPY (EGD) (N/A)  INJECTION (N/A)  RESOLUTION CLIP (N/A) 3 Days Post-Op   Precautions: Fall     ASSESSMENT :  Based on the objective data described below, the patient presents at her baseline, independent/safe with all mobility without a device. Pt drives, is very active. Lives with family, assist available if needed. Pt is ok for discharge home per MD.     Skilled physical therapy is not indicated at this time. PLAN :  Discharge Recommendations: None  Further Equipment Recommendations for Discharge: none     SUBJECTIVE:   Patient stated I feel good. I always use the stairs for exercise.     OBJECTIVE DATA SUMMARY:   HISTORY:    Past Medical History   Diagnosis Date    Aortic root dilatation (HCC)      mild    Arthritis      hands    CAD (coronary artery disease) 2001     started seeing Cardiologist 10 years ago for low pulse    Dyslipidemia     GERD (gastroesophageal reflux disease)      does not take anything other than Tums    HTN (hypertension)     Hypothyroidism     Thyroid disease      Past Surgical History   Procedure Laterality Date    Vascular surgery procedure unlist  2015     aortic aneurysm repair    Upper gi endoscopy,ctrl bleed  2/17/2017           Prior Level of Function/Home Situation: Independent  Personal factors and/or comorbidities impacting plan of care:     Home Situation  Home Environment: Private residence  One/Two Story Residence: One story, 2 KEILA with rail  Living Alone: No  Support Systems: Child(michelle)  Patient Expects to be Discharged to[de-identified] Private residence  Current DME Used/Available at Home: None    EXAMINATION/PRESENTATION/DECISION MAKING:   Critical Behavior:  Neurologic State: Alert  Orientation Level: Oriented X4  Cognition: Appropriate decision making, Appropriate for age attention/concentration, Appropriate safety awareness, Follows commands  Safety/Judgement: Awareness of environment, Good awareness of safety precautions    Strength:    Strength: Within functional limits                    Tone & Sensation:   Tone: Normal              Sensation: Intact              Range Of Motion:  AROM: Within functional limits                       Coordination:  Coordination: Within functional limits    Functional Mobility:  Bed Mobility:  Rolling: Independent  Supine to Sit: Independent  Sit to Supine: Independent  Scooting: Independent  Transfers:  Sit to Stand: Independent  Stand to Sit: Independent        Bed to Chair: Independent              Balance:   Sitting: Intact  Standing: Intact  Ambulation/Gait Training:              Gait Description (WDL): Within defined limits             Up/down 6 steps, one rail/SBA     Functional Measure:    Elder Mobility Scale    19/20         EMS and G-code impairment scale:  Percentage of impairment CH  0% CI  1-19% CJ  20-39% CK  40-59% CL  60-79% CM  80-99% CN  100%   EMS Score 0-20 20 17-19 13-16 9-12 5-8 1-4 0      Scores under 10  generally these patients are dependent in mobility maneuvers; require help with  basic ADL, such as transfers, toileting and dressing. Scores between 10  13  generally these patients are borderline in terms of safe mobility and  independence in ADL i.e. they require some help with some mobility maneuvers. Scores over 14  Generally these patients are able to perform mobility maneuvers alone and safely  and are independent in basic ADL. G codes: In compliance with CMSs Claims Based Outcome Reporting, the following G-code set was chosen for this patient based on their primary functional limitation being treated: The outcome measure chosen to determine the severity of the functional limitation was the EMS with a score of 19/20 which was correlated with the impairment scale.     ? Mobility - Walking and Moving Around:     - CURRENT STATUS: CI - 1%-19% impaired, limited or restricted    - GOAL STATUS: CI - 1%-19% impaired, limited or restricted    - D/C STATUS:  CI - 1%-19% impaired, limited or restricted        Physical Therapy Evaluation Charge Determination   History Examination Presentation Decision-Making   LOW Complexity : Zero comorbidities / personal factors that will impact the outcome / POC LOW Complexity : 1-2 Standardized tests and measures addressing body structure, function, activity limitation and / or participation in recreation  LOW Complexity : Stable, uncomplicated  LOW Complexity : FOTO score of       Based on the above components, the patient evaluation is determined to be of the following complexity level: LOW     Pain:  Pain Scale 1: Numeric (0 - 10)  Pain Intensity 1: 0     Activity Tolerance:   RA VSS  Please refer to the flowsheet for vital signs taken during this treatment. After treatment:   [x]   Patient left in no apparent distress sitting up in chair  []   Patient left in no apparent distress in bed  [x]   Call bell left within reach  [x]   Nursing notified  []   Caregiver present  []   Bed alarm activated    COMMUNICATION/EDUCATION:   Communication/Collaboration:  [x]   Fall prevention education was provided and the patient/caregiver indicated understanding. [x]   Patient/family have participated as able and agree with findings and recommendations. []   Patient is unable to participate in plan of care at this time.   Findings and recommendations were discussed with: Registered Nurse and     Thank you for this referral.  Teddy De León, PT   Time Calculation: 25 mins

## 2017-02-20 NOTE — PROGRESS NOTES
[id number] Paged Dr. Ekta Parks to inquire about aspirin after discharge as per Dr. Shelton Rhoades orders. 26 Per Dr. Ekta Parks, pt should not be sent home on aspirin, verified with readback.

## 2017-02-20 NOTE — PROGRESS NOTES
Bedside shift change report given to [place] (oncoming nurse) by Elvin Jordan RN (offgoing nurse). Report included the following information SBAR, Kardex, Intake/Output, MAR, Recent Results and Cardiac Rhythm Lines.

## 2017-02-20 NOTE — PROGRESS NOTES
OT orders received and chart was reviewed. Pt was going over discharge instructions when OT attempted to see her for eval and at this time pt has no OT needs. Pt is at her baseline and will be returning home with family and OT to sign off. No services needed at discharge.

## 2017-02-20 NOTE — DISCHARGE SUMMARY
Hospitalist Discharge Summary     Patient ID:  Seamus Shaikh  [id number]  80 y.o.  1934    PCP on record: Manuela Skinner MD    Admit date: 2/16/2017  Discharge date and time: 2/20/2017      DISCHARGE DIAGNOSIS:  Upper GI bleeding - stomach ulcer and bleeding from biopsy site  Syncope  S/p orthostasis  Essential HTN  Hypothyroidism  Glaucoma  Thoracic AA      CONSULTATIONS:  IP CONSULT TO GASTROENTEROLOGY    Excerpted HPI from H&P of Vidya Chappell MD:   An 44-year-old [facility] American   female who recently has been having problems with upper abdominal   pain. Her sister in the room notes she has also had a very poor   appetite. She may have lost several pounds over the past few months. She underwent an endoscopy without Dr. Candy Jenkins on 02/14/2017, which   per patient report was normal. The patient reports 2 biopsies were   done and then she was discharged to home. She initially did fine then   the next day she began to feel \"dizzy and sick. \" She began to feel very   lightheaded when she would stand. At times she felt like she had   \"motion sickness\" when she tried to stand up. When she laid down, she   felt fine. Her bowel movements were initially normal, then said on the   next day on Thursday she had a syncopal episode. She felt the urge to   move her bowels. She was partially bent over trying to lift up to get the   toilet seat down, when the next thing she remembers she woke up on   the floor. She had lost control of her bowels while she was out and she   had a very large black bowel movement. She continued to feel   lightheaded and dizzy so she decided to come to the emergency room. She did have some mild periumbilical abdominal pain, cramping that is   now resolved. She denied any nausea, vomiting or hematemesis. No   bright red blood per rectum, no chest pain, new shortness of breath,   headaches.  She has felt diffusely weak and tired and has felt cold in   the emergency room.       In the emergency room, she was borderline hypotensive with blood   pressures as low as 100/50. She has not had any further bowel   movements. Rectal exam by the emergency room physician revealed   black melenic stool that was heme-positive. Her hemoglobin came   back at 9.2. We do not have any baseline hemoglobins in the past 5   years. We were called to admit the patient. Her BUN was increased to 28.           ______________________________________________________________________  DISCHARGE SUMMARY/HOSPITAL COURSE:  for full details see H&P, daily progress notes, labs, consult notes. Upper GI bleeding (2/17/2017) POA suspect bleeding from a biopsy site  Recent EGD by Dr Wilber Cartagena on 2/14/2017, normal by her report, 2 biopsies done  Angie Sport melena on rectal exam in ED, no further BM since arriving in the ED     IV PPI acid suppression and added carafate QID  Serial HgBs, transfused 2/18 - stable hgb  Held ASA and BP meds  Diet advanced slowly    S/p EGD: Impression:   -Normal esophagus  -Single shallow gastric antral 2mm ulceration without vessel; injected with 1 cc Epi 1:[id number] mix with blanching  -Single shallow gastric incisural ulceration 4mm diameter with central red visible vessel; injected with 2cc Epi 1:[id number] mix with blanching, followed by placement of 2 hemoclips across visible vessel  -Normal duodenum      Syncope POA suspect was due to hypovolemia  Orthostasis POA in ED  Suspect due to hypovolemia with the bleeding, Neg orthostatics  S/p iVF      Essential HTN POA    Held lisinopril-HCTZ  PRN hydralazine   Hypothyroidism POA  Continue replacement      Thoracic aortic aneurysm POA   Glaucoma POA continue eye drops        _______________________________________________________________________  Patient seen and examined by me on discharge day. Pertinent Findings:  Gen:    Not in distress     CVS:   Regular rhythm.   No edema  Abd:  Soft, not distended, not tender _______________________________________________________________________  DISCHARGE MEDICATIONS:   Current Discharge Medication List      START taking these medications    Details   sucralfate (CARAFATE) 1 gram tablet Take 1 Tab by mouth four (4) times daily for 14 days. Qty: 56 Tab, Refills: 0      pantoprazole (PROTONIX) 40 mg tablet Take 1 Tab by mouth two (2) times a day. Qty: 60 Tab, Refills: 0         CONTINUE these medications which have NOT CHANGED    Details   lisinopril-hydrochlorothiazide (PRINZIDE, ZESTORETIC) 20-25 mg per tablet Take  by mouth daily. HYDROcodone-acetaminophen (NORCO) 5-325 mg per tablet Take 1 Tab by mouth every six (6) hours as needed for Pain. Max Daily Amount: 4 Tabs. Qty: 15 Tab, Refills: 0      atorvastatin (LIPITOR) 20 mg tablet Take 1 Tab by mouth daily. Qty: 90 Tab, Refills: 3    Associated Diagnoses: Dyslipidemia      Calcium Carbonate-Vit D3-Min (CALTRATE 600+D PLUS MINERALS) 600 mg calcium- 400 unit Tab Take 2 Tabs by mouth daily. multivitamins-minerals-lutein (CENTRUM SILVER) Tab Take 1 Tab by mouth. brimonidine (ALPHAGAN) 0.15 % ophthalmic solution Administer 1 Drop to both eyes three (3) times daily. Associated Diagnoses: Essential hypertension, benign      aspirin 81 mg tablet Take  by mouth daily. levothyroxine (SYNTHROID) 125 mcg tablet Take  by mouth daily (before breakfast). LATANOPROST (XALATAN OP) Apply 1 Drop to eye daily. My Recommended Diet, Activity, Wound Care, and follow-up labs are listed in the patient's Discharge Insturctions which I have personally completed and reviewed.     _______________________________________________________________________  DISPOSITION:    Home with Family: x   Home with HH/PT/OT/RN:    SNF/LTC:    NATHAN:    OTHER:        Condition at Discharge:  Stable  _______________________________________________________________________  Follow up with:   PCP : Shila Palomares MD  Follow-up Information     Follow up With Details Comments Contact Info    Maral Chaudhari MD Schedule an appointment as soon as possible for a visit in 1 week  Singh Bullock [address] [facility], [address] [place] [address]      Lopez Arguello MD   [facility] [address]  [phone]                Total time in minutes spent coordinating this discharge (includes going over instructions, follow-up, prescriptions, and preparing report for sign off to her PCP) : 33 minutes    Signed:  Chelly Becerra MD

## 2017-02-21 NOTE — PROGRESS NOTES
1345 IV removed. Pt dressed for discharge. Discharge instructions, prescriptions, and medication education provided. Pt advised to discontinue taking aspirin at home as per Dr. Shanta Levi. Pt denies any questions or concerns at this time. Pt's son and daughter in law present and plan to drive pt home. Pt and her belongings transported to car with volunteer wheelchair transport.

## 2017-06-24 ENCOUNTER — APPOINTMENT (OUTPATIENT)
Dept: CT IMAGING | Age: 82
End: 2017-06-24
Attending: EMERGENCY MEDICINE
Payer: MEDICARE

## 2017-06-24 ENCOUNTER — HOSPITAL ENCOUNTER (EMERGENCY)
Age: 82
Discharge: HOME | End: 2017-06-25
Attending: EMERGENCY MEDICINE
Payer: MEDICARE

## 2017-06-24 VITALS
OXYGEN SATURATION: 99 % | HEART RATE: 82 BPM | HEIGHT: 59 IN | SYSTOLIC BLOOD PRESSURE: 149 MMHG | TEMPERATURE: 98.2 F | WEIGHT: 112.21 LBS | DIASTOLIC BLOOD PRESSURE: 88 MMHG | RESPIRATION RATE: 20 BRPM | BODY MASS INDEX: 22.62 KG/M2

## 2017-06-24 DIAGNOSIS — I10 ESSENTIAL HYPERTENSION: Primary | ICD-10-CM

## 2017-06-24 DIAGNOSIS — R51.9 HEADACHE, UNSPECIFIED HEADACHE TYPE: ICD-10-CM

## 2017-06-24 LAB
ALBUMIN SERPL BCP-MCNC: 3.9 G/DL (ref 3.5–5)
ALBUMIN/GLOB SERPL: 0.9 {RATIO} (ref 1.1–2.2)
ALP SERPL-CCNC: 121 U/L (ref 45–117)
ALT SERPL-CCNC: 22 U/L (ref 12–78)
ANION GAP BLD CALC-SCNC: 5 MMOL/L (ref 5–15)
AST SERPL W P-5'-P-CCNC: 23 U/L (ref 15–37)
BASOPHILS # BLD AUTO: 0 K/UL (ref 0–0.1)
BASOPHILS # BLD: 0 % (ref 0–1)
BILIRUB SERPL-MCNC: 0.5 MG/DL (ref 0.2–1)
BUN SERPL-MCNC: 13 MG/DL (ref 6–20)
BUN/CREAT SERPL: 17 (ref 12–20)
CALCIUM SERPL-MCNC: 9.3 MG/DL (ref 8.5–10.1)
CHLORIDE SERPL-SCNC: 104 MMOL/L (ref 97–108)
CK SERPL-CCNC: 69 U/L (ref 26–192)
CO2 SERPL-SCNC: 29 MMOL/L (ref 21–32)
CREAT SERPL-MCNC: 0.75 MG/DL (ref 0.55–1.02)
EOSINOPHIL # BLD: 0.3 K/UL (ref 0–0.4)
EOSINOPHIL NFR BLD: 4 % (ref 0–7)
ERYTHROCYTE [DISTWIDTH] IN BLOOD BY AUTOMATED COUNT: 18 % (ref 11.5–14.5)
GLOBULIN SER CALC-MCNC: 4.2 G/DL (ref 2–4)
GLUCOSE SERPL-MCNC: 96 MG/DL (ref 65–100)
HCT VFR BLD AUTO: 42.7 % (ref 35–47)
HGB BLD-MCNC: 14.5 G/DL (ref 11.5–16)
LYMPHOCYTES # BLD AUTO: 26 % (ref 12–49)
LYMPHOCYTES # BLD: 1.9 K/UL (ref 0.8–3.5)
MCH RBC QN AUTO: 25.6 PG (ref 26–34)
MCHC RBC AUTO-ENTMCNC: 34 G/DL (ref 30–36.5)
MCV RBC AUTO: 75.3 FL (ref 80–99)
MONOCYTES # BLD: 0.8 K/UL (ref 0–1)
MONOCYTES NFR BLD AUTO: 11 % (ref 5–13)
NEUTS SEG # BLD: 4.4 K/UL (ref 1.8–8)
NEUTS SEG NFR BLD AUTO: 59 % (ref 32–75)
PLATELET # BLD AUTO: 165 K/UL (ref 150–400)
POTASSIUM SERPL-SCNC: 3.9 MMOL/L (ref 3.5–5.1)
PROT SERPL-MCNC: 8.1 G/DL (ref 6.4–8.2)
RBC # BLD AUTO: 5.67 M/UL (ref 3.8–5.2)
RBC MORPH BLD: ABNORMAL
SODIUM SERPL-SCNC: 138 MMOL/L (ref 136–145)
TROPONIN I SERPL-MCNC: <0.04 NG/ML
WBC # BLD AUTO: 7.4 K/UL (ref 3.6–11)
WBC MORPH BLD: ABNORMAL

## 2017-06-24 PROCEDURE — 93005 ELECTROCARDIOGRAM TRACING: CPT

## 2017-06-24 PROCEDURE — 96374 THER/PROPH/DIAG INJ IV PUSH: CPT

## 2017-06-24 PROCEDURE — 99284 EMERGENCY DEPT VISIT MOD MDM: CPT

## 2017-06-24 PROCEDURE — 36415 COLL VENOUS BLD VENIPUNCTURE: CPT | Performed by: EMERGENCY MEDICINE

## 2017-06-24 PROCEDURE — 80053 COMPREHEN METABOLIC PANEL: CPT | Performed by: EMERGENCY MEDICINE

## 2017-06-24 PROCEDURE — 70450 CT HEAD/BRAIN W/O DYE: CPT

## 2017-06-24 PROCEDURE — 82550 ASSAY OF CK (CPK): CPT | Performed by: EMERGENCY MEDICINE

## 2017-06-24 PROCEDURE — 85025 COMPLETE CBC W/AUTO DIFF WBC: CPT | Performed by: EMERGENCY MEDICINE

## 2017-06-24 PROCEDURE — 84484 ASSAY OF TROPONIN QUANT: CPT | Performed by: EMERGENCY MEDICINE

## 2017-06-24 PROCEDURE — 74011250636 HC RX REV CODE- 250/636: Performed by: EMERGENCY MEDICINE

## 2017-06-24 RX ORDER — HYDRALAZINE HYDROCHLORIDE 20 MG/ML
10 INJECTION INTRAMUSCULAR; INTRAVENOUS
Status: COMPLETED | OUTPATIENT
Start: 2017-06-24 | End: 2017-06-24

## 2017-06-24 RX ORDER — BUTALBITAL, ACETAMINOPHEN AND CAFFEINE 50; 325; 40 MG/1; MG/1; MG/1
2 TABLET ORAL
Status: COMPLETED | OUTPATIENT
Start: 2017-06-24 | End: 2017-06-25

## 2017-06-24 RX ADMIN — HYDRALAZINE HYDROCHLORIDE 10 MG: 20 INJECTION INTRAMUSCULAR; INTRAVENOUS at 23:22

## 2017-06-25 LAB
ATRIAL RATE: 75 BPM
CALCULATED P AXIS, ECG09: 19 DEGREES
CALCULATED R AXIS, ECG10: -53 DEGREES
CALCULATED T AXIS, ECG11: 46 DEGREES
DIAGNOSIS, 93000: NORMAL
P-R INTERVAL, ECG05: 248 MS
Q-T INTERVAL, ECG07: 412 MS
QRS DURATION, ECG06: 96 MS
QTC CALCULATION (BEZET), ECG08: 460 MS
VENTRICULAR RATE, ECG03: 75 BPM

## 2017-06-25 PROCEDURE — 74011250637 HC RX REV CODE- 250/637: Performed by: EMERGENCY MEDICINE

## 2017-06-25 RX ADMIN — BUTALBITAL, ACETAMINOPHEN AND CAFFEINE 2 TABLET: 50; 325; 40 TABLET ORAL at 00:08

## 2017-06-25 NOTE — DISCHARGE INSTRUCTIONS

## 2017-06-25 NOTE — ED PROVIDER NOTES
HPI Comments: Jovanni Gaston is a 80 y.o. female with PMhx significant for HTN, hypothyroidism, CAD, dyslipidemia, arthritis, GERD who presents ambulatory to the ED with cc of elevated blood pressure that onset today. She reports associated posterior HA. Pt states that she has had fluctuant blood pressures for the last 2 weeks secondary to changes in her anti-hypertensive medications. Pt states that she has been compliant with her new rx's as well as her current rx for lisinopril. She reports that she has not had her evening dose of lisinopril tonight. Family reports that the pt has had an unsteady gait over the last few days. PT denies any CP, SOB, dysuria, N/V/D, vision changes, or abdominal pain. Social history significant for: - Tobacco, - EtOH, - Illicit drug use    PCP: Michelle Palacios MD    There are no other complaints, changes or physical findings at this time. Written by Rachael Gonzalez ED Scribe, as dictated by Nima Herzog DO. The history is provided by the patient. No  was used. Past Medical History:   Diagnosis Date    Aortic root dilatation (HCC)     mild    Arthritis     hands    CAD (coronary artery disease) 2001    started seeing Cardiologist 10 years ago for low pulse    Dyslipidemia     GERD (gastroesophageal reflux disease)     does not take anything other than Tums    HTN (hypertension)     Hypothyroidism     Thyroid disease        Past Surgical History:   Procedure Laterality Date    UPPER GI ENDOSCOPY,CTRL BLEED  2/17/2017         VASCULAR SURGERY PROCEDURE UNLIST  2015    aortic aneurysm repair         History reviewed. No pertinent family history. Social History     Social History    Marital status:      Spouse name: N/A    Number of children: N/A    Years of education: N/A     Occupational History    Not on file.      Social History Main Topics    Smoking status: Never Smoker    Smokeless tobacco: Not on file    Alcohol use No    Drug use: No    Sexual activity: Not on file     Other Topics Concern    Not on file     Social History Narrative         ALLERGIES: Antibiotic [okcez-blcpw-ndpagqk-pramoxine]    Review of Systems   Constitutional: Negative for fatigue and fever. Eyes: Negative. Negative for visual disturbance. Respiratory: Negative for shortness of breath and wheezing. Cardiovascular: Negative for chest pain and leg swelling.        + elevated blood pressure   Gastrointestinal: Negative for blood in stool, constipation, diarrhea, nausea and vomiting. Endocrine: Negative. Genitourinary: Negative for difficulty urinating and dysuria. Musculoskeletal: Positive for gait problem. Skin: Negative for rash. Allergic/Immunologic: Negative. Neurological: Positive for headaches. Negative for weakness and numbness. Hematological: Negative. Psychiatric/Behavioral: Negative. Patient Vitals for the past 12 hrs:   Temp Pulse Resp BP SpO2   06/24/17 2330 - - - 149/88 99 %   06/24/17 2300 - - - (!) 186/97 99 %   06/24/17 2250 - - - - 100 %   06/24/17 2249 - - - 193/90 -   06/24/17 2145 - - - (!) 194/99 100 %   06/24/17 2014 98.2 °F (36.8 °C) 82 20 (!) 197/103 100 %     Physical Exam   Constitutional: She is oriented to person, place, and time. She appears well-developed and well-nourished. HENT:   Head: Normocephalic and atraumatic. Mouth/Throat: Mucous membranes are normal.   Eyes: EOM are normal. Pupils are equal, round, and reactive to light. Neck: Normal range of motion. No JVD present. No tracheal deviation present. Cardiovascular: Normal rate, regular rhythm, normal heart sounds and intact distal pulses. Exam reveals no gallop and no friction rub. No murmur heard. Pulmonary/Chest: Effort normal and breath sounds normal. No stridor. She has no wheezes. She has no rales. Abdominal: Soft. Bowel sounds are normal. She exhibits no distension and no mass. There is no tenderness.  There is no guarding. Musculoskeletal: Normal range of motion. She exhibits no edema or tenderness. Trace pitting edema   Neurological: She is alert and oriented to person, place, and time. Cranial nerves 2-12 intact, no facial droop, no slurred speech. 5/5 muscle strength in bilateral UE and LE. Sensation intact bilaterally. No pronator drift. Skin: Skin is warm and dry. No rash noted. Psychiatric: She has a normal mood and affect. Her behavior is normal. Judgment and thought content normal.        MDM  Number of Diagnoses or Management Options  Essential hypertension:   Headache, unspecified headache type:   Diagnosis management comments: Pt presenting with hypertension and headache, but denies any other complaints. Pt with no focal neurologic deficits. Will check labs, head CT, ekg to eval for end organ damage. Will treat with antihypertensives then reassess. Amount and/or Complexity of Data Reviewed  Clinical lab tests: ordered and reviewed  Tests in the medicine section of CPT®: ordered and reviewed  Review and summarize past medical records: yes  Independent visualization of images, tracings, or specimens: yes    Patient Progress  Patient progress: stable    Procedures    EKG interpretation: (Preliminary) 2033  Rhythm: normal sinus rhythm with 1st degree AV block; and regular . Rate (approx.): 75; Axis: left axis deviation; MN interval: normal; QRS interval: normal ; ST/T wave: No ST changes. .  Written by Noni Reid ED scribe, as dictated by Steven Mayo,     LABORATORY TESTS:  Recent Results (from the past 12 hour(s))   EKG, 12 LEAD, INITIAL    Collection Time: 06/24/17  8:33 PM   Result Value Ref Range    Ventricular Rate 75 BPM    Atrial Rate 75 BPM    P-R Interval 248 ms    QRS Duration 96 ms    Q-T Interval 412 ms    QTC Calculation (Bezet) 460 ms    Calculated P Axis 19 degrees    Calculated R Axis -53 degrees    Calculated T Axis 46 degrees    Diagnosis       Sinus rhythm with 1st degree AV block  Left axis deviation  Voltage criteria for left ventricular hypertrophy  Anterior infarct (cited on or before 16-FEB-2017)  Abnormal ECG  When compared with ECG of 16-FEB-2017 18:32,  Significant changes have occurred     CBC WITH AUTOMATED DIFF    Collection Time: 06/24/17  9:31 PM   Result Value Ref Range    WBC 7.4 3.6 - 11.0 K/uL    RBC 5.67 (H) 3.80 - 5.20 M/uL    HGB 14.5 11.5 - 16.0 g/dL    HCT 42.7 35.0 - 47.0 %    MCV 75.3 (L) 80.0 - 99.0 FL    MCH 25.6 (L) 26.0 - 34.0 PG    MCHC 34.0 30.0 - 36.5 g/dL    RDW 18.0 (H) 11.5 - 14.5 %    PLATELET 773 501 - 122 K/uL    NEUTROPHILS 59 32 - 75 %    LYMPHOCYTES 26 12 - 49 %    MONOCYTES 11 5 - 13 %    EOSINOPHILS 4 0 - 7 %    BASOPHILS 0 0 - 1 %    ABS. NEUTROPHILS 4.4 1.8 - 8.0 K/UL    ABS. LYMPHOCYTES 1.9 0.8 - 3.5 K/UL    ABS. MONOCYTES 0.8 0.0 - 1.0 K/UL    ABS. EOSINOPHILS 0.3 0.0 - 0.4 K/UL    ABS. BASOPHILS 0.0 0.0 - 0.1 K/UL    RBC COMMENTS ANISOCYTOSIS  1+        WBC COMMENTS REACTIVE LYMPHS     METABOLIC PANEL, COMPREHENSIVE    Collection Time: 06/24/17  9:31 PM   Result Value Ref Range    Sodium 138 136 - 145 mmol/L    Potassium 3.9 3.5 - 5.1 mmol/L    Chloride 104 97 - 108 mmol/L    CO2 29 21 - 32 mmol/L    Anion gap 5 5 - 15 mmol/L    Glucose 96 65 - 100 mg/dL    BUN 13 6 - 20 MG/DL    Creatinine 0.75 0.55 - 1.02 MG/DL    BUN/Creatinine ratio 17 12 - 20      GFR est AA >60 >60 ml/min/1.73m2    GFR est non-AA >60 >60 ml/min/1.73m2    Calcium 9.3 8.5 - 10.1 MG/DL    Bilirubin, total 0.5 0.2 - 1.0 MG/DL    ALT (SGPT) 22 12 - 78 U/L    AST (SGOT) 23 15 - 37 U/L    Alk.  phosphatase 121 (H) 45 - 117 U/L    Protein, total 8.1 6.4 - 8.2 g/dL    Albumin 3.9 3.5 - 5.0 g/dL    Globulin 4.2 (H) 2.0 - 4.0 g/dL    A-G Ratio 0.9 (L) 1.1 - 2.2     CK W/ REFLX CKMB    Collection Time: 06/24/17  9:31 PM   Result Value Ref Range    CK 69 26 - 192 U/L   TROPONIN I    Collection Time: 06/24/17  9:31 PM   Result Value Ref Range    Troponin-I, Qt. <0.04 <0.05 ng/mL       IMAGING RESULTS:  CT HEAD WO CONT   Final Result   CT Results  (Last 48 hours)               06/24/17 2245  CT HEAD WO CONT Final result    Impression:  IMPRESSION: No Intracranial Disease Evident on Head CT. Narrative:  INDICATION: headache, htn       EXAM: CT HEAD without contrast.    CT dose reduction was achieved through use of a standardized protocol tailored   for this examination and automatic exposure control for dose modulation. FINDINGS: Unenhanced CT Head is performed. The brain parenchyma is unremarkable   in appearance for age, without evidence for infarct. There is no bleed, mass,   shift, hydrocephalus or extra-axial fluid collection. Bone windows are   unremarkable. MEDICATIONS GIVEN:  Medications   hydrALAZINE (APRESOLINE) 20 mg/mL injection 10 mg (10 mg IntraVENous Given 6/24/17 2742)   butalbital-acetaminophen-caffeine (FIORICET, ESGIC) -40 mg per tablet 2 Tab (2 Tabs Oral Given 6/25/17 0008)       IMPRESSION:  1. Essential hypertension    2. Headache, unspecified headache type        PLAN:  1. Discharge Medication List as of 6/24/2017 11:50 PM        2. Follow-up Information     Follow up With Details Comments Contact Info    Andrew Smith MD Schedule an appointment as soon as possible for a visit in 2 days  [facility] [address]  [phone]      [facility] EMERGENCY DEPT  As needed, If symptoms worsen [address] [place] [address]  [phone]        Return to ED if worse     DISCHARGE NOTE  11:49 PM  The patient has been re-evaluated and is ready for discharge. Reviewed available results with patient. Counseled patient on diagnosis and care plan. Patient has expressed understanding, and all questions have been answered. Patient agrees with plan and agrees to follow up as recommended, or return to the ED if their symptoms worsen.  Discharge instructions have been provided and explained to the patient, along with reasons to return to the ED. This note is prepared by James Wharton, acting as Scribe for Liz Khanna DO. Liz Khanna DO: The scribe's documentation has been prepared under my direction and personally reviewed by me in its entirety. I confirm that the note above accurately reflects all work, treatment, procedures, and medical decision making performed by me.

## 2017-06-25 NOTE — ED NOTES
Pt received discharge instructions from Dr. Chao Vieira and verbalized understanding. Pt wheeled to exit with family and helped into car.

## 2017-07-09 ENCOUNTER — HOSPITAL ENCOUNTER (EMERGENCY)
Age: 82
Discharge: HOME | End: 2017-07-10
Attending: EMERGENCY MEDICINE
Payer: MEDICARE

## 2017-07-09 DIAGNOSIS — M79.602 LEFT ARM PAIN: Primary | ICD-10-CM

## 2017-07-09 PROCEDURE — 93005 ELECTROCARDIOGRAM TRACING: CPT

## 2017-07-09 PROCEDURE — 99285 EMERGENCY DEPT VISIT HI MDM: CPT

## 2017-07-10 ENCOUNTER — APPOINTMENT (OUTPATIENT)
Dept: GENERAL RADIOLOGY | Age: 82
End: 2017-07-10
Attending: EMERGENCY MEDICINE
Payer: MEDICARE

## 2017-07-10 VITALS
HEIGHT: 59 IN | DIASTOLIC BLOOD PRESSURE: 68 MMHG | BODY MASS INDEX: 22.58 KG/M2 | WEIGHT: 112 LBS | RESPIRATION RATE: 18 BRPM | HEART RATE: 76 BPM | SYSTOLIC BLOOD PRESSURE: 130 MMHG | TEMPERATURE: 98.3 F | OXYGEN SATURATION: 96 %

## 2017-07-10 LAB
ALBUMIN SERPL BCP-MCNC: 3.2 G/DL (ref 3.5–5)
ALBUMIN/GLOB SERPL: 0.9 {RATIO} (ref 1.1–2.2)
ALP SERPL-CCNC: 98 U/L (ref 45–117)
ALT SERPL-CCNC: 22 U/L (ref 12–78)
ANION GAP BLD CALC-SCNC: 6 MMOL/L (ref 5–15)
APPEARANCE UR: CLEAR
AST SERPL W P-5'-P-CCNC: 19 U/L (ref 15–37)
ATRIAL RATE: 71 BPM
BACTERIA URNS QL MICRO: NEGATIVE /HPF
BASOPHILS # BLD AUTO: 0 K/UL (ref 0–0.1)
BASOPHILS # BLD: 0 % (ref 0–1)
BILIRUB SERPL-MCNC: 0.4 MG/DL (ref 0.2–1)
BILIRUB UR QL: NEGATIVE
BUN SERPL-MCNC: 20 MG/DL (ref 6–20)
BUN/CREAT SERPL: 27 (ref 12–20)
CALCIUM SERPL-MCNC: 8.6 MG/DL (ref 8.5–10.1)
CALCULATED P AXIS, ECG09: 39 DEGREES
CALCULATED R AXIS, ECG10: -50 DEGREES
CALCULATED T AXIS, ECG11: 64 DEGREES
CHLORIDE SERPL-SCNC: 105 MMOL/L (ref 97–108)
CK SERPL-CCNC: 51 U/L (ref 26–192)
CO2 SERPL-SCNC: 27 MMOL/L (ref 21–32)
COLOR UR: ABNORMAL
CREAT SERPL-MCNC: 0.75 MG/DL (ref 0.55–1.02)
DIAGNOSIS, 93000: NORMAL
EOSINOPHIL # BLD: 0.4 K/UL (ref 0–0.4)
EOSINOPHIL NFR BLD: 6 % (ref 0–7)
EPITH CASTS URNS QL MICRO: ABNORMAL /LPF
ERYTHROCYTE [DISTWIDTH] IN BLOOD BY AUTOMATED COUNT: 18.5 % (ref 11.5–14.5)
GLOBULIN SER CALC-MCNC: 3.5 G/DL (ref 2–4)
GLUCOSE SERPL-MCNC: 142 MG/DL (ref 65–100)
GLUCOSE UR STRIP.AUTO-MCNC: NEGATIVE MG/DL
HCT VFR BLD AUTO: 38.2 % (ref 35–47)
HGB BLD-MCNC: 13 G/DL (ref 11.5–16)
HGB UR QL STRIP: NEGATIVE
KETONES UR QL STRIP.AUTO: NEGATIVE MG/DL
LEUKOCYTE ESTERASE UR QL STRIP.AUTO: ABNORMAL
LYMPHOCYTES # BLD AUTO: 18 % (ref 12–49)
LYMPHOCYTES # BLD: 1.2 K/UL (ref 0.8–3.5)
MCH RBC QN AUTO: 26.3 PG (ref 26–34)
MCHC RBC AUTO-ENTMCNC: 34 G/DL (ref 30–36.5)
MCV RBC AUTO: 77.2 FL (ref 80–99)
MONOCYTES # BLD: 0.6 K/UL (ref 0–1)
MONOCYTES NFR BLD AUTO: 9 % (ref 5–13)
NEUTS SEG # BLD: 4.7 K/UL (ref 1.8–8)
NEUTS SEG NFR BLD AUTO: 67 % (ref 32–75)
NITRITE UR QL STRIP.AUTO: NEGATIVE
P-R INTERVAL, ECG05: 246 MS
PH UR STRIP: 6 [PH] (ref 5–8)
PLATELET # BLD AUTO: 155 K/UL (ref 150–400)
POTASSIUM SERPL-SCNC: 3.8 MMOL/L (ref 3.5–5.1)
PROT SERPL-MCNC: 6.7 G/DL (ref 6.4–8.2)
PROT UR STRIP-MCNC: NEGATIVE MG/DL
Q-T INTERVAL, ECG07: 428 MS
QRS DURATION, ECG06: 104 MS
QTC CALCULATION (BEZET), ECG08: 465 MS
RBC # BLD AUTO: 4.95 M/UL (ref 3.8–5.2)
RBC #/AREA URNS HPF: ABNORMAL /HPF (ref 0–5)
SODIUM SERPL-SCNC: 138 MMOL/L (ref 136–145)
SP GR UR REFRACTOMETRY: 1.02 (ref 1–1.03)
TROPONIN I SERPL-MCNC: <0.04 NG/ML
TROPONIN I SERPL-MCNC: <0.04 NG/ML
UA: UC IF INDICATED,UAUC: ABNORMAL
UROBILINOGEN UR QL STRIP.AUTO: 1 EU/DL (ref 0.2–1)
VENTRICULAR RATE, ECG03: 71 BPM
WBC # BLD AUTO: 7 K/UL (ref 3.6–11)
WBC URNS QL MICRO: ABNORMAL /HPF (ref 0–4)

## 2017-07-10 PROCEDURE — 82550 ASSAY OF CK (CPK): CPT | Performed by: EMERGENCY MEDICINE

## 2017-07-10 PROCEDURE — 71020 XR CHEST PA LAT: CPT

## 2017-07-10 PROCEDURE — 84484 ASSAY OF TROPONIN QUANT: CPT | Performed by: EMERGENCY MEDICINE

## 2017-07-10 PROCEDURE — 85025 COMPLETE CBC W/AUTO DIFF WBC: CPT | Performed by: EMERGENCY MEDICINE

## 2017-07-10 PROCEDURE — 81001 URINALYSIS AUTO W/SCOPE: CPT | Performed by: EMERGENCY MEDICINE

## 2017-07-10 PROCEDURE — 36415 COLL VENOUS BLD VENIPUNCTURE: CPT | Performed by: EMERGENCY MEDICINE

## 2017-07-10 PROCEDURE — 80053 COMPREHEN METABOLIC PANEL: CPT | Performed by: EMERGENCY MEDICINE

## 2017-07-10 RX ORDER — AMLODIPINE BESYLATE 10 MG/1
10 TABLET ORAL DAILY
COMMUNITY
End: 2017-12-31

## 2017-07-10 RX ORDER — LISINOPRIL 40 MG/1
40 TABLET ORAL DAILY
COMMUNITY
End: 2018-12-10

## 2017-07-10 RX ORDER — SUCRALFATE 1 G/1
1 TABLET ORAL 4 TIMES DAILY
COMMUNITY

## 2017-07-10 NOTE — ED NOTES
Pt updated on continuous plan of care a this time to include lab repeats. Pt resting comfortably at this time, no needs expressed.

## 2017-07-10 NOTE — ED NOTES
Bedside and Verbal shift change report given to Lisset Montalvo RN (oncoming nurse) by Elza Romano RN (offgoing nurse). Report included the following information SBAR, Kardex, ED Summary, [facility] and Recent Results.

## 2017-07-10 NOTE — ED NOTES
Assumed care of pt from EMS at this time, report received. Pt arrives with c/o sudden onset upper L arm pain while resting in bed this evening. Pt states pain was non-radiating, however caused her to become SOB and nauseated at the time. Upon arrival pt with relief of all symptoms-no pain at this time. Pt denies CP, SOB, N/V/D at this time. Pt denies any recent illness. Pt denies any urinary symptoms. Pt denies fevers, body aches, chills at home. EMS reports patient had x 4 81 mg aspirin at home. Pt resting comfortably on the stretcher in a position of comfort.  Pt in no acute distress at this time. Pt ANOx4.  Call bell within reach.  Side rails x 2.  Cardiac monitor x 3.  Stretcher locked in the lowest position.  Pt aware of plan to await for MD/PA-C/NP assessment, and pt/family verbalizes understanding.  Will continue to monitor any pain.

## 2017-07-10 NOTE — DISCHARGE INSTRUCTIONS
Chest Pain: Care Instructions  Your Care Instructions  There are many things that can cause chest pain. Some are not serious and will get better on their own in a few days. But some kinds of chest pain need more testing and treatment. Your doctor may have recommended a follow-up visit in the next 8 to 12 hours. If you are not getting better, you may need more tests or treatment. Even though your doctor has released you, you still need to watch for any problems. The doctor carefully checked you, but sometimes problems can develop later. If you have new symptoms or if your symptoms do not get better, get medical care right away. If you have worse or different chest pain or pressure that lasts more than 5 minutes or you passed out (lost consciousness), call 911 or seek other emergency help right away. A medical visit is only one step in your treatment. Even if you feel better, you still need to do what your doctor recommends, such as going to all suggested follow-up appointments and taking medicines exactly as directed. This will help you recover and help prevent future problems. How can you care for yourself at home? · Rest until you feel better. · Take your medicine exactly as prescribed. Call your doctor if you think you are having a problem with your medicine. · Do not drive after taking a prescription pain medicine. When should you call for help? Call 911 if:  · You passed out (lost consciousness). · You have severe difficulty breathing. · You have symptoms of a heart attack. These may include:  ¨ Chest pain or pressure, or a strange feeling in your chest.  ¨ Sweating. ¨ Shortness of breath. ¨ Nausea or vomiting. ¨ Pain, pressure, or a strange feeling in your back, neck, jaw, or upper belly or in one or both shoulders or arms. ¨ Lightheadedness or sudden weakness. ¨ A fast or irregular heartbeat.   After you call 911, the  may tell you to chew 1 adult-strength or 2 to 4 low-dose aspirin. Wait for an ambulance. Do not try to drive yourself. Call your doctor today if:  · You have any trouble breathing. · Your chest pain gets worse. · You are dizzy or lightheaded, or you feel like you may faint. · You are not getting better as expected. · You are having new or different chest pain. Where can you learn more? Go to http://graeme-vlad.info/. Enter A120 in the search box to learn more about \"Chest Pain: Care Instructions. \"  Current as of: March 20, 2017  Content Version: 11.3  © 3573-3460 [employer]. Care instructions adapted under license by [employer] (which disclaims liability or warranty for this information). If you have questions about a medical condition or this instruction, always ask your healthcare professional. [address] any warranty or liability for your use of this information.

## 2017-07-10 NOTE — ED NOTES
Pt discharged by Dr Sindhu Way. Pt provided with discharge instructions Rx and instructions on follow up care. Pt out of ED under own power with steady gait accompanied by family member.

## 2017-07-10 NOTE — ED PROVIDER NOTES
HPI Comments: Christa Kiran is a 80 y.o. female with PMhx significant for HTN, CAD, GERD, arthritis, and dyslipidemia who presents via EMS to the ED c/o an acute onset of a sharp, stabbing, transient episode of left upper arm pain around 2200 this evening lasting ~20 minutes. The pt reports associated sx of SOB with the episode, nausea, left sided neck pain, and a mild headache. She expresses that she was resting at the onset of pain leading her to call EMS. She reports that her symptoms have resolved, and she is asymptomatic upon ED arrival. The pt denies any recent travels, surgeries, or hormone therapy use. She denies any h/o MI, DVT, PE. She denies any chest pain, abdominal pain, vomiting, diarrhea, cough, dizziness, lightheadedness, dysuria, or hematuria. PCP: Marcelino Ordaz MD      There are no other complaints, changes or physical findings at this time. Written by Alisa Acevedo ED Scribe, as dictated by Augustus Gilbert MD     The history is provided by the patient. No  was used. Past Medical History:   Diagnosis Date    Aortic root dilatation (HCC)     mild    Arthritis     hands    CAD (coronary artery disease) 2001    started seeing Cardiologist 10 years ago for low pulse    Dyslipidemia     GERD (gastroesophageal reflux disease)     does not take anything other than Tums    HTN (hypertension)     Hypothyroidism     Thyroid disease        Past Surgical History:   Procedure Laterality Date    UPPER GI ENDOSCOPY,CTRL BLEED  2/17/2017         VASCULAR SURGERY PROCEDURE UNLIST  2015    aortic aneurysm repair         History reviewed. No pertinent family history. Social History     Social History    Marital status:      Spouse name: N/A    Number of children: N/A    Years of education: N/A     Occupational History    Not on file.      Social History Main Topics    Smoking status: Never Smoker    Smokeless tobacco: Never Used    Alcohol use No  Drug use: No    Sexual activity: Not on file     Other Topics Concern    Not on file     Social History Narrative         ALLERGIES: Antibiotic [[id number]]    Review of Systems   Constitutional: Negative for chills, fatigue and fever. HENT: Negative for congestion, rhinorrhea and sore throat. Eyes: Negative for pain, discharge and visual disturbance. Respiratory: Positive for shortness of breath. Negative for cough, chest tightness and wheezing. Cardiovascular: Negative for chest pain, palpitations and leg swelling. Gastrointestinal: Positive for nausea. Negative for abdominal pain, constipation, diarrhea and vomiting. Genitourinary: Negative for dysuria, frequency and hematuria. Musculoskeletal: Positive for myalgias (LUE) and neck pain (left sided). Negative for arthralgias and back pain. Skin: Negative for rash. Neurological: Positive for headaches (mild). Negative for dizziness, weakness and light-headedness. Psychiatric/Behavioral: Negative. Patient Vitals for the past 12 hrs:   Temp Pulse Resp BP SpO2   07/10/17 0200 - 76 18 130/68 96 %   07/10/17 0104 - 74 16 133/65 96 %   07/10/17 0001 - - - 135/72 98 %   07/09/17 2336 98.3 °F (36.8 °C) 72 17 156/60 99 %          Physical Exam   Constitutional: She is oriented to person, place, and time. She appears well-developed and well-nourished. No distress. HENT:   Head: Normocephalic and atraumatic. Eyes: EOM are normal. Right eye exhibits no discharge. Left eye exhibits no discharge. No scleral icterus. Neck: Normal range of motion. Neck supple. No tracheal deviation present. Cardiovascular: Normal rate, regular rhythm, normal heart sounds and intact distal pulses. Exam reveals no gallop and no friction rub. No murmur heard. Pulses:       Carotid pulses are on the right side with bruit, and on the left side with bruit. Radial pulses are 2+ on the right side, and 2+ on the left side. Dorsalis pedis pulses are 2+ on the right side, and 2+ on the left side. Pulmonary/Chest: Effort normal and breath sounds normal. No respiratory distress. She has no wheezes. She has no rales. Abdominal: Soft. She exhibits no distension. There is no tenderness. Musculoskeletal: Normal range of motion. She exhibits no edema or tenderness (no calf tenderness). Tenderness throughout left trapezius   Lymphadenopathy:     She has no cervical adenopathy. Neurological: She is alert and oriented to person, place, and time. No focal neuro deficits   Skin: Skin is warm and dry. No rash noted. Psychiatric: She has a normal mood and affect. Nursing note and vitals reviewed. MDM  Number of Diagnoses or Management Options  Left arm pain:   Diagnosis management comments:     Patient presents to ED with LUE pain that has resolved upon ED arrival.  Differential includes MSK pain, muscle spasm, osteoarthritis, atypical ACS. Do not suspect PE (Well's score 0). - CBC, CMP, troponin  - CXR         Amount and/or Complexity of Data Reviewed  Clinical lab tests: ordered and reviewed  Tests in the radiology section of CPT®: ordered and reviewed  Tests in the medicine section of CPT®: ordered and reviewed  Review and summarize past medical records: yes  Independent visualization of images, tracings, or specimens: yes    Patient Progress  Patient progress: stable    ED Course       Procedures    EKG interpretation: (Preliminary)  2345  Rhythm: normal sinus rhythm and 1st degree block; and regular . Rate (approx.): 71; Axis: left axis deviation; CO interval: prolonged; QRS interval: normal ; ST/T wave: non-specific T-wave abnormality; Other findings: non-ischemic. PROGRESS NOTE:  11:58 PM  Pt has been re-evaluated. The pt defers any medication for her headache at this time. Will continue to monitor. Written by BENENTT Rider, as dictated by Aretha Madrigal MD.    PROGRESS NOTE:  3:40 AM  Troponin negative x 2. Labs unremarkable. Pt has been sleeping throughout most of ED course but upon reevaluation, she states she is feeling better. Will discharge with follow up. Discussed results, prescriptions and follow up plan with patient. Provided customary return to ED instructions. Patient expressed understanding. Doris Haines MD    LABORATORY TESTS:  Recent Results (from the past 12 hour(s))   EKG, 12 LEAD, INITIAL    Collection Time: 07/09/17 11:45 PM   Result Value Ref Range    Ventricular Rate 71 BPM    Atrial Rate 71 BPM    P-R Interval 246 ms    QRS Duration 104 ms    Q-T Interval 428 ms    QTC Calculation (Bezet) 465 ms    Calculated P Axis 39 degrees    Calculated R Axis -50 degrees    Calculated T Axis 64 degrees    Diagnosis       Sinus rhythm with 1st degree AV block  Left axis deviation  Moderate voltage criteria for LVH, may be normal variant  When compared with ECG of 24-JUN-2017 20:33,  No significant change was found     CBC WITH AUTOMATED DIFF    Collection Time: 07/10/17 12:02 AM   Result Value Ref Range    WBC 7.0 3.6 - 11.0 K/uL    RBC 4.95 3.80 - 5.20 M/uL    HGB 13.0 11.5 - 16.0 g/dL    HCT 38.2 35.0 - 47.0 %    MCV 77.2 (L) 80.0 - 99.0 FL    MCH 26.3 26.0 - 34.0 PG    MCHC 34.0 30.0 - 36.5 g/dL    RDW 18.5 (H) 11.5 - 14.5 %    PLATELET 633 822 - 855 K/uL    NEUTROPHILS 67 32 - 75 %    LYMPHOCYTES 18 12 - 49 %    MONOCYTES 9 5 - 13 %    EOSINOPHILS 6 0 - 7 %    BASOPHILS 0 0 - 1 %    ABS. NEUTROPHILS 4.7 1.8 - 8.0 K/UL    ABS. LYMPHOCYTES 1.2 0.8 - 3.5 K/UL    ABS. MONOCYTES 0.6 0.0 - 1.0 K/UL    ABS. EOSINOPHILS 0.4 0.0 - 0.4 K/UL    ABS.  BASOPHILS 0.0 0.0 - 0.1 K/UL   METABOLIC PANEL, COMPREHENSIVE    Collection Time: 07/10/17 12:02 AM   Result Value Ref Range    Sodium 138 136 - 145 mmol/L    Potassium 3.8 3.5 - 5.1 mmol/L    Chloride 105 97 - 108 mmol/L    CO2 27 21 - 32 mmol/L    Anion gap 6 5 - 15 mmol/L    Glucose 142 (H) 65 - 100 mg/dL    BUN 20 6 - 20 MG/DL    Creatinine 0. 75 0.55 - 1.02 MG/DL    BUN/Creatinine ratio 27 (H) 12 - 20      GFR est AA >60 >60 ml/min/1.73m2    GFR est non-AA >60 >60 ml/min/1.73m2    Calcium 8.6 8.5 - 10.1 MG/DL    Bilirubin, total 0.4 0.2 - 1.0 MG/DL    ALT (SGPT) 22 12 - 78 U/L    AST (SGOT) 19 15 - 37 U/L    Alk. phosphatase 98 45 - 117 U/L    Protein, total 6.7 6.4 - 8.2 g/dL    Albumin 3.2 (L) 3.5 - 5.0 g/dL    Globulin 3.5 2.0 - 4.0 g/dL    A-G Ratio 0.9 (L) 1.1 - 2.2     CK W/ REFLX CKMB    Collection Time: 07/10/17 12:02 AM   Result Value Ref Range    CK 51 26 - 192 U/L   TROPONIN I    Collection Time: 07/10/17 12:02 AM   Result Value Ref Range    Troponin-I, Qt. <0.04 <0.05 ng/mL   URINALYSIS W/ REFLEX CULTURE    Collection Time: 07/10/17 12:25 AM   Result Value Ref Range    Color YELLOW/STRAW      Appearance CLEAR CLEAR      Specific gravity 1.018 1.003 - 1.030      pH (UA) 6.0 5.0 - 8.0      Protein NEGATIVE  NEG mg/dL    Glucose NEGATIVE  NEG mg/dL    Ketone NEGATIVE  NEG mg/dL    Bilirubin NEGATIVE  NEG      Blood NEGATIVE  NEG      Urobilinogen 1.0 0.2 - 1.0 EU/dL    Nitrites NEGATIVE  NEG      Leukocyte Esterase SMALL (A) NEG      WBC 0-4 0 - 4 /hpf    RBC 0-5 0 - 5 /hpf    Epithelial cells FEW FEW /lpf    Bacteria NEGATIVE  NEG /hpf    UA:UC IF INDICATED CULTURE NOT INDICATED BY UA RESULT CNI         IMAGING RESULTS:  XR CHEST PA LAT   Final Result   INDICATION: Dyspnea. Left arm pain.     COMPARISON: February 16, 2017     FINDINGS: PA and lateral views of the chest demonstrate a stable heart size. The  thoracic aorta remain severely tortuous and atherosclerotic. The lungs are clear  bilaterally bilaterally. The visualized osseous structures are unremarkable.     IMPRESSION  IMPRESSION: No acute process. No change from the prior study. IMPRESSION:  1. Left arm pain        PLAN:  1. Current Discharge Medication List        2.    Follow-up Information     Follow up With Details Comments Contact Info    Roni Cintron MD In 2 days  [address] [facility]      Henry Styles MD  Please follow up with your cardiologist as soon as possible [address] [address]  [phone]      [facility] EMERGENCY DEPT  As needed, If symptoms worsen [address]   [address] [address]  [phone]        3. Return to ED if worse   Discharge Note:  3:44 AM  The patient is ready for discharge. The patient's signs, symptoms, diagnosis, and discharge instruction have been discussed and the patient has conveyed their understanding. The patient is to follow up as recommended or return to the ER should their symptoms worsen. Plan has been discussed and the patient is in agreement. Written by Aguilar Acevedo ED Scribe, as dictated by Atul Potts MD     Attestation: This note is prepared by Marshal Acevedo, acting as Scribe for Atul Potts MD.      Atul Potts MD: The scribe's documentation has been prepared under my direction and personally reviewed by me in its entirety. I confirm that the note above accurately reflects all work, treatment, procedures, and medical decision making performed by me.

## 2017-12-31 ENCOUNTER — HOSPITAL ENCOUNTER (EMERGENCY)
Age: 82
Discharge: HOME | End: 2017-12-31
Attending: EMERGENCY MEDICINE
Payer: MEDICARE

## 2017-12-31 ENCOUNTER — APPOINTMENT (OUTPATIENT)
Dept: CT IMAGING | Age: 82
End: 2017-12-31
Attending: EMERGENCY MEDICINE
Payer: MEDICARE

## 2017-12-31 VITALS
RESPIRATION RATE: 20 BRPM | HEIGHT: 60 IN | HEART RATE: 88 BPM | TEMPERATURE: 98 F | WEIGHT: 121.69 LBS | SYSTOLIC BLOOD PRESSURE: 172 MMHG | BODY MASS INDEX: 23.89 KG/M2 | OXYGEN SATURATION: 98 % | DIASTOLIC BLOOD PRESSURE: 78 MMHG

## 2017-12-31 DIAGNOSIS — G44.89 OTHER HEADACHE SYNDROME: ICD-10-CM

## 2017-12-31 DIAGNOSIS — I10 ESSENTIAL HYPERTENSION: Primary | ICD-10-CM

## 2017-12-31 LAB
ALBUMIN SERPL-MCNC: 3.7 G/DL (ref 3.5–5)
ALBUMIN/GLOB SERPL: 0.9 {RATIO} (ref 1.1–2.2)
ALP SERPL-CCNC: 132 U/L (ref 45–117)
ALT SERPL-CCNC: 25 U/L (ref 12–78)
ANION GAP SERPL CALC-SCNC: 5 MMOL/L (ref 5–15)
AST SERPL-CCNC: 23 U/L (ref 15–37)
BASOPHILS # BLD: 0 K/UL (ref 0–0.1)
BASOPHILS NFR BLD: 0 % (ref 0–1)
BILIRUB SERPL-MCNC: 0.4 MG/DL (ref 0.2–1)
BUN SERPL-MCNC: 15 MG/DL (ref 6–20)
BUN/CREAT SERPL: 19 (ref 12–20)
CALCIUM SERPL-MCNC: 8.7 MG/DL (ref 8.5–10.1)
CHLORIDE SERPL-SCNC: 108 MMOL/L (ref 97–108)
CO2 SERPL-SCNC: 28 MMOL/L (ref 21–32)
CREAT SERPL-MCNC: 0.78 MG/DL (ref 0.55–1.02)
EOSINOPHIL # BLD: 0.2 K/UL (ref 0–0.4)
EOSINOPHIL NFR BLD: 3 % (ref 0–7)
ERYTHROCYTE [DISTWIDTH] IN BLOOD BY AUTOMATED COUNT: 14.3 % (ref 11.5–14.5)
GLOBULIN SER CALC-MCNC: 4.2 G/DL (ref 2–4)
GLUCOSE SERPL-MCNC: 80 MG/DL (ref 65–100)
HCT VFR BLD AUTO: 41.6 % (ref 35–47)
HGB BLD-MCNC: 14.6 G/DL (ref 11.5–16)
LYMPHOCYTES # BLD: 1.7 K/UL (ref 0.8–3.5)
LYMPHOCYTES NFR BLD: 25 % (ref 12–49)
MCH RBC QN AUTO: 28.9 PG (ref 26–34)
MCHC RBC AUTO-ENTMCNC: 35.1 G/DL (ref 30–36.5)
MCV RBC AUTO: 82.4 FL (ref 80–99)
MONOCYTES # BLD: 0.7 K/UL (ref 0–1)
MONOCYTES NFR BLD: 10 % (ref 5–13)
NEUTS SEG # BLD: 4.3 K/UL (ref 1.8–8)
NEUTS SEG NFR BLD: 62 % (ref 32–75)
PLATELET # BLD AUTO: 155 K/UL (ref 150–400)
POTASSIUM SERPL-SCNC: 4 MMOL/L (ref 3.5–5.1)
PROT SERPL-MCNC: 7.9 G/DL (ref 6.4–8.2)
RBC # BLD AUTO: 5.05 M/UL (ref 3.8–5.2)
SODIUM SERPL-SCNC: 141 MMOL/L (ref 136–145)
TROPONIN I SERPL-MCNC: <0.04 NG/ML
WBC # BLD AUTO: 6.9 K/UL (ref 3.6–11)

## 2017-12-31 PROCEDURE — 80053 COMPREHEN METABOLIC PANEL: CPT | Performed by: EMERGENCY MEDICINE

## 2017-12-31 PROCEDURE — 74011250637 HC RX REV CODE- 250/637: Performed by: EMERGENCY MEDICINE

## 2017-12-31 PROCEDURE — 99285 EMERGENCY DEPT VISIT HI MDM: CPT

## 2017-12-31 PROCEDURE — 70450 CT HEAD/BRAIN W/O DYE: CPT

## 2017-12-31 PROCEDURE — 84484 ASSAY OF TROPONIN QUANT: CPT | Performed by: EMERGENCY MEDICINE

## 2017-12-31 PROCEDURE — 36415 COLL VENOUS BLD VENIPUNCTURE: CPT | Performed by: EMERGENCY MEDICINE

## 2017-12-31 PROCEDURE — 85025 COMPLETE CBC W/AUTO DIFF WBC: CPT | Performed by: EMERGENCY MEDICINE

## 2017-12-31 RX ORDER — HYDROCHLOROTHIAZIDE 12.5 MG/1
12.5 TABLET ORAL DAILY
Qty: 30 TAB | Refills: 0 | Status: SHIPPED | OUTPATIENT
Start: 2017-12-31 | End: 2018-01-06

## 2017-12-31 RX ORDER — HYDROCHLOROTHIAZIDE 12.5 MG/1
12.5 TABLET ORAL DAILY
Qty: 30 TAB | Refills: 0 | Status: SHIPPED | OUTPATIENT
Start: 2017-12-31 | End: 2017-12-31

## 2017-12-31 RX ORDER — ACETAMINOPHEN 500 MG
1000 TABLET ORAL
Status: COMPLETED | OUTPATIENT
Start: 2017-12-31 | End: 2017-12-31

## 2017-12-31 RX ADMIN — ACETAMINOPHEN 1000 MG: 500 TABLET ORAL at 14:56

## 2017-12-31 NOTE — DISCHARGE INSTRUCTIONS
DASH Diet: Care Instructions  Your Care Instructions    The DASH diet is an eating plan that can help lower your blood pressure. DASH stands for Dietary Approaches to Stop Hypertension. Hypertension is high blood pressure. The DASH diet focuses on eating foods that are high in calcium, potassium, and magnesium. These nutrients can lower blood pressure. The foods that are highest in these nutrients are fruits, vegetables, low-fat dairy products, nuts, seeds, and legumes. But taking calcium, potassium, and magnesium supplements instead of eating foods that are high in those nutrients does not have the same effect. The DASH diet also includes whole grains, fish, and poultry. The DASH diet is one of several lifestyle changes your doctor may recommend to lower your high blood pressure. Your doctor may also want you to decrease the amount of sodium in your diet. Lowering sodium while following the DASH diet can lower blood pressure even further than just the DASH diet alone. Follow-up care is a key part of your treatment and safety. Be sure to make and go to all appointments, and call your doctor if you are having problems. It's also a good idea to know your test results and keep a list of the medicines you take. How can you care for yourself at home? Following the DASH diet  · Eat 4 to 5 servings of fruit each day. A serving is 1 medium-sized piece of fruit, ½ cup chopped or canned fruit, 1/4 cup dried fruit, or 4 ounces (½ cup) of fruit juice. Choose fruit more often than fruit juice. · Eat 4 to 5 servings of vegetables each day. A serving is 1 cup of lettuce or raw leafy vegetables, ½ cup of chopped or cooked vegetables, or 4 ounces (½ cup) of vegetable juice. Choose vegetables more often than vegetable juice. · Get 2 to 3 servings of low-fat and fat-free dairy each day. A serving is 8 ounces of milk, 1 cup of yogurt, or 1 ½ ounces of cheese. · Eat 6 to 8 servings of grains each day.  A serving is 1 slice of bread, 1 ounce of dry cereal, or ½ cup of cooked rice, pasta, or cooked cereal. Try to choose whole-grain products as much as possible. · Limit lean meat, poultry, and fish to 2 servings each day. A serving is 3 ounces, about the size of a deck of cards. · Eat 4 to 5 servings of nuts, seeds, and legumes (cooked dried beans, lentils, and split peas) each week. A serving is 1/3 cup of nuts, 2 tablespoons of seeds, or ½ cup of cooked beans or peas. · Limit fats and oils to 2 to 3 servings each day. A serving is 1 teaspoon of vegetable oil or 2 tablespoons of salad dressing. · Limit sweets and added sugars to 5 servings or less a week. A serving is 1 tablespoon jelly or jam, ½ cup sorbet, or 1 cup of lemonade. · Eat less than 2,300 milligrams (mg) of sodium a day. If you limit your sodium to 1,500 mg a day, you can lower your blood pressure even more. Tips for success  · Start small. Do not try to make dramatic changes to your diet all at once. You might feel that you are missing out on your favorite foods and then be more likely to not follow the plan. Make small changes, and stick with them. Once those changes become habit, add a few more changes. · Try some of the following:  ¨ Make it a goal to eat a fruit or vegetable at every meal and at snacks. This will make it easy to get the recommended amount of fruits and vegetables each day. ¨ Try yogurt topped with fruit and nuts for a snack or healthy dessert. ¨ Add lettuce, tomato, cucumber, and onion to sandwiches. ¨ Combine a ready-made pizza crust with low-fat mozzarella cheese and lots of vegetable toppings. Try using tomatoes, squash, spinach, broccoli, carrots, cauliflower, and onions. ¨ Have a variety of cut-up vegetables with a low-fat dip as an appetizer instead of chips and dip. ¨ Sprinkle sunflower seeds or chopped almonds over salads. Or try adding chopped walnuts or almonds to cooked vegetables.   ¨ Try some vegetarian meals using beans and peas. Add garbanzo or kidney beans to salads. Make burritos and tacos with mashed parker beans or black beans. Where can you learn more? Go to http://graeme-vlad.info/. Enter Q966 in the search box to learn more about \"DASH Diet: Care Instructions. \"  Current as of: September 21, 2016  Content Version: 11.4  © 5272-8602 [employer]. Care instructions adapted under license by [employer] (which disclaims liability or warranty for this information). If you have questions about a medical condition or this instruction, always ask your healthcare professional. [address] any warranty or liability for your use of this information. High Blood Pressure: Care Instructions  Your Care Instructions    If your blood pressure is usually above 140/90, you have high blood pressure, or hypertension. That means the top number is 140 or higher or the bottom number is 90 or higher, or both. Despite what a lot of people think, high blood pressure usually doesn't cause headaches or make you feel dizzy or lightheaded. It usually has no symptoms. But it does increase your risk for heart attack, stroke, and kidney or eye damage. The higher your blood pressure, the more your risk increases. Your doctor will give you a goal for your blood pressure. Your goal will be based on your health and your age. An example of a goal is to keep your blood pressure below 140/90. Lifestyle changes, such as eating healthy and being active, are always important to help lower blood pressure. You might also take medicine to reach your blood pressure goal.  Follow-up care is a key part of your treatment and safety. Be sure to make and go to all appointments, and call your doctor if you are having problems. It's also a good idea to know your test results and keep a list of the medicines you take. How can you care for yourself at home?   Medical treatment  · If you stop taking your medicine, your blood pressure will go back up. You may take one or more types of medicine to lower your blood pressure. Be safe with medicines. Take your medicine exactly as prescribed. Call your doctor if you think you are having a problem with your medicine. · Talk to your doctor before you start taking aspirin every day. Aspirin can help certain people lower their risk of a heart attack or stroke. But taking aspirin isn't right for everyone, because it can cause serious bleeding. · See your doctor regularly. You may need to see the doctor more often at first or until your blood pressure comes down. · If you are taking blood pressure medicine, talk to your doctor before you take decongestants or anti-inflammatory medicine, such as ibuprofen. Some of these medicines can raise blood pressure. · Learn how to check your blood pressure at home. Lifestyle changes  · Stay at a healthy weight. This is especially important if you put on weight around the waist. Losing even 10 pounds can help you lower your blood pressure. · If your doctor recommends it, get more exercise. Walking is a good choice. Bit by bit, increase the amount you walk every day. Try for at least 30 minutes on most days of the week. You also may want to swim, bike, or do other activities. · Avoid or limit alcohol. Talk to your doctor about whether you can drink any alcohol. · Try to limit how much sodium you eat to less than 2,300 milligrams (mg) a day. Your doctor may ask you to try to eat less than 1,500 mg a day. · Eat plenty of fruits (such as bananas and oranges), vegetables, legumes, whole grains, and low-fat dairy products. · Lower the amount of saturated fat in your diet. Saturated fat is found in animal products such as milk, cheese, and meat. Limiting these foods may help you lose weight and also lower your risk for heart disease. · Do not smoke. Smoking increases your risk for heart attack and stroke.  If you need help quitting, talk to your doctor about stop-smoking programs and medicines. These can increase your chances of quitting for good. When should you call for help? Call 911 anytime you think you may need emergency care. This may mean having symptoms that suggest that your blood pressure is causing a serious heart or blood vessel problem. Your blood pressure may be over 180/110. ? For example, call 911 if:  ? · You have symptoms of a heart attack. These may include:  ¨ Chest pain or pressure, or a strange feeling in the chest.  ¨ Sweating. ¨ Shortness of breath. ¨ Nausea or vomiting. ¨ Pain, pressure, or a strange feeling in the back, neck, jaw, or upper belly or in one or both shoulders or arms. ¨ Lightheadedness or sudden weakness. ¨ A fast or irregular heartbeat. ? · You have symptoms of a stroke. These may include:  ¨ Sudden numbness, tingling, weakness, or loss of movement in your face, arm, or leg, especially on only one side of your body. ¨ Sudden vision changes. ¨ Sudden trouble speaking. ¨ Sudden confusion or trouble understanding simple statements. ¨ Sudden problems with walking or balance. ¨ A sudden, severe headache that is different from past headaches. ? · You have severe back or belly pain. ?Do not wait until your blood pressure comes down on its own. Get help right away. ?Call your doctor now or seek immediate care if:  ? · Your blood pressure is much higher than normal (such as 180/110 or higher), but you don't have symptoms. ? · You think high blood pressure is causing symptoms, such as:  ¨ Severe headache. ¨ Blurry vision. ? Watch closely for changes in your health, and be sure to contact your doctor if:  ? · Your blood pressure measures 140/90 or higher at least 2 times. That means the top number is 140 or higher or the bottom number is 90 or higher, or both. ? · You think you may be having side effects from your blood pressure medicine.    ? · Your blood pressure is usually normal, but it goes above normal at least 2 times. Where can you learn more? Go to http://graeme-vlad.info/. Enter L519 in the search box to learn more about \"High Blood Pressure: Care Instructions. \"  Current as of: September 21, 2016  Content Version: 11.4  © 1773-0181 [employer]. Care instructions adapted under license by [employer] (which disclaims liability or warranty for this information). If you have questions about a medical condition or this instruction, always ask your healthcare professional. [address] any warranty or liability for your use of this information.

## 2017-12-31 NOTE — ED PROVIDER NOTES
EMERGENCY DEPARTMENT HISTORY AND PHYSICAL EXAM      Date: 12/31/2017  Patient Name: Tim Huber    History of Presenting Illness     Chief Complaint   Patient presents with    Hypertension     Pt presents to ED for headache x dys and high blood pressure x week, pt has HTN and takes her medicine with no recent changes     Headache       History Provided By: Patient    HPI: Tim Huber, 80 y.o. female with PMHx significant for HTN, CAD, GERD, and arthritis, presents ambulatory to the ED with cc of HA and elevated blood pressure today. At time of examination, pt states her HA has improved slightly. Pt also notes mild SOB. She states she is currently taking Lisinopril 5 mg x2 daily, is compliant with her prescribed medications, and has not had recent change to her medication regimen. Pt denies hx of DM and hypercholesterolemia. She specifically denies CP, ABD pain, dysuria, leg swelling, leg pain, speech difficulty, or vision changes. PCP: Osmar Colon MD    There are no other complaints, changes, or physical findings at this time. Current Outpatient Prescriptions   Medication Sig Dispense Refill    lisinopril (PRINIVIL, ZESTRIL) 40 mg tablet Take 40 mg by mouth daily.  atorvastatin (LIPITOR) 20 mg tablet Take 1 Tab by mouth daily. (Patient taking differently: Take 40 mg by mouth daily.) 90 Tab 3    multivitamins-minerals-lutein (CENTRUM SILVER) Tab Take 1 Tab by mouth.  brimonidine (ALPHAGAN) 0.15 % ophthalmic solution Administer 1 Drop to both eyes three (3) times daily.  levothyroxine (SYNTHROID) 125 mcg tablet Take  by mouth daily (before breakfast).  LATANOPROST (XALATAN OP) Apply 1 Drop to eye daily.  sucralfate (CARAFATE) 1 gram tablet Take 1 g by mouth four (4) times daily.          Past History     Past Medical History:  Past Medical History:   Diagnosis Date    Aortic root dilatation (HCC)     mild    Arthritis     hands    CAD (coronary artery disease) 2001 started seeing Cardiologist 10 years ago for low pulse    Dyslipidemia     GERD (gastroesophageal reflux disease)     does not take anything other than Tums    HTN (hypertension)     Hypothyroidism     Thyroid disease        Past Surgical History:  Past Surgical History:   Procedure Laterality Date    UPPER GI ENDOSCOPY,CTRL BLEED  2/17/2017         VASCULAR SURGERY PROCEDURE UNLIST  2015    aortic aneurysm repair       Family History:  History reviewed. No pertinent family history. Social History:  Social History   Substance Use Topics    Smoking status: Never Smoker    Smokeless tobacco: Never Used    Alcohol use No       Allergies: Allergies   Allergen Reactions    Antibiotic [Vabey-Ijysa-Nyokavj-Pramoxine] Other (comments)     Unknown antibiotic begins with a \"C\"         Review of Systems   Review of Systems   Eyes: Negative for visual disturbance (double). Respiratory: Positive for shortness of breath (mild). Cardiovascular: Negative for chest pain and leg swelling.        +elevated BP   Gastrointestinal: Negative for abdominal pain. Genitourinary: Negative for dysuria. Musculoskeletal: Negative for myalgias (legs). Neurological: Positive for headaches. Negative for speech difficulty. All other systems reviewed and are negative. Physical Exam   Physical Exam     Vital signs and nursing notes reviewed    CONSTITUTIONAL: Alert, in no apparent distress; well-developed; well-nourished; thin build  HEAD:  Normocephalic, atraumatic  EYES: PERRL; EOM's intact. ENTM: Nose: no rhinorrhea; Throat: no erythema or exudate, mucous membranes moist  Neck:  Supple. trachea is midline. RESP: Chest clear, equal breath sounds. - W/R/R  CV: S1 and S2 WNL; No murmurs, gallops or rubs. 2+ radial and DP pulses bilaterally. /100  GI: non-distended, normal bowel sounds, abdomen soft and non-tender. No masses or organomegaly. : No costo-vertebral angle tenderness.   BACK:  Non-tender, normal appearance  UPPER EXT:  Normal inspection. no joint or soft tissue swelling  LOWER EXT: No edema, no calf tenderness. NEURO: Alert and oriented x3, 5/5 strength and light touch sensation intact in bilateral upper and lower extremities; normal speech, no facial droop, no neglect  SKIN: No rashes; Warm and dry  PSYCH: Normal mood, normal affect      Diagnostic Study Results     Labs -     Recent Results (from the past 12 hour(s))   CBC WITH AUTOMATED DIFF    Collection Time: 12/31/17  1:35 PM   Result Value Ref Range    WBC 6.9 3.6 - 11.0 K/uL    RBC 5.05 3.80 - 5.20 M/uL    HGB 14.6 11.5 - 16.0 g/dL    HCT 41.6 35.0 - 47.0 %    MCV 82.4 80.0 - 99.0 FL    MCH 28.9 26.0 - 34.0 PG    MCHC 35.1 30.0 - 36.5 g/dL    RDW 14.3 11.5 - 14.5 %    PLATELET 475 857 - 337 K/uL    NEUTROPHILS 62 32 - 75 %    LYMPHOCYTES 25 12 - 49 %    MONOCYTES 10 5 - 13 %    EOSINOPHILS 3 0 - 7 %    BASOPHILS 0 0 - 1 %    ABS. NEUTROPHILS 4.3 1.8 - 8.0 K/UL    ABS. LYMPHOCYTES 1.7 0.8 - 3.5 K/UL    ABS. MONOCYTES 0.7 0.0 - 1.0 K/UL    ABS. EOSINOPHILS 0.2 0.0 - 0.4 K/UL    ABS. BASOPHILS 0.0 0.0 - 0.1 K/UL   METABOLIC PANEL, COMPREHENSIVE    Collection Time: 12/31/17  1:35 PM   Result Value Ref Range    Sodium 141 136 - 145 mmol/L    Potassium 4.0 3.5 - 5.1 mmol/L    Chloride 108 97 - 108 mmol/L    CO2 28 21 - 32 mmol/L    Anion gap 5 5 - 15 mmol/L    Glucose 80 65 - 100 mg/dL    BUN 15 6 - 20 MG/DL    Creatinine 0.78 0.55 - 1.02 MG/DL    BUN/Creatinine ratio 19 12 - 20      GFR est AA >60 >60 ml/min/1.73m2    GFR est non-AA >60 >60 ml/min/1.73m2    Calcium 8.7 8.5 - 10.1 MG/DL    Bilirubin, total 0.4 0.2 - 1.0 MG/DL    ALT (SGPT) 25 12 - 78 U/L    AST (SGOT) 23 15 - 37 U/L    Alk.  phosphatase 132 (H) 45 - 117 U/L    Protein, total 7.9 6.4 - 8.2 g/dL    Albumin 3.7 3.5 - 5.0 g/dL    Globulin 4.2 (H) 2.0 - 4.0 g/dL    A-G Ratio 0.9 (L) 1.1 - 2.2     TROPONIN I    Collection Time: 12/31/17  1:35 PM   Result Value Ref Range    Troponin-I, Qt. <0.04 <0.05 ng/mL       Radiologic Studies -   CT Results  (Last 48 hours)               12/31/17 1231  CT HEAD WO CONT Final result    Impression:  IMPRESSION: No acute intracranial process. Narrative:  EXAM:  CT HEAD WO CONT       INDICATION:   Headache, acute, severe, thunderclap, worst HA of life       COMPARISON: June 24, 2017. CONTRAST:  None. TECHNIQUE: Unenhanced CT of the head was performed using 5 mm images. Brain and   bone windows were generated. CT dose reduction was achieved through use of a   standardized protocol tailored for this examination and automatic exposure   control for dose modulation. FINDINGS:   The ventricles and sulci are normal in size, shape and configuration and   midline. There is no significant white matter disease. There is no intracranial   hemorrhage, extra-axial collection, mass, mass effect or midline shift. The   basilar cisterns are open. No acute infarct is identified. The bone windows   demonstrate no abnormalities. The visualized portions of the paranasal sinuses   and mastoid air cells are clear. Medical Decision Making   I am the first provider for this patient. I reviewed the vital signs, available nursing notes, past medical history, past surgical history, family history and social history. Vital Signs-Reviewed the patient's vital signs. Patient Vitals for the past 12 hrs:   Temp Pulse Resp BP SpO2   12/31/17 1444 - 88 20 - 98 %   12/31/17 1434 - 86 17 - 98 %   12/31/17 1432 - - - 172/78 -   12/31/17 1400 - 89 14 164/71 98 %   12/31/17 1330 - 89 21 (!) 171/100 98 %   12/31/17 1216 98 °F (36.7 °C) 76 16 (!) 199/101 98 %       Pulse Oximetry Analysis - 98% on RA    Records Reviewed: Nursing Notes and Old Medical Records    Provider Notes (Medical Decision Making):     81 yo Female with elevated BP, improved without intervention here. Reassuring labs. Pt on low dose lisinopril only, will add HCTZ.  Pt understands needs for low salt diet and f/u with PCP for electrolyte check and adjustment of blood pressure medications. ED Course:   Initial assessment performed. The patients presenting problems have been discussed, and they are in agreement with the care plan formulated and outlined with them. I have encouraged them to ask questions as they arise throughout their visit. 2:53 PM  Pt's pressure improved to 172/72. Disposition:  DISCHARGE NOTE:  2:53 PM  The patient is ready for discharge. The patients signs, symptoms, diagnosis, and instructions for discharge have been discussed and the pt has conveyed their understanding. The patient is to follow up as recommended or return to the ER should their symptoms worsen. Plan has been discussed and patient has conveyed their agreement. PLAN:  1. Current Discharge Medication List      START taking these medications    Details   hydroCHLOROthiazide (HYDRODIURIL) 12.5 mg tablet Take 1 Tab by mouth daily for 30 days. Qty: 30 Tab, Refills: 0           2. Follow-up Information     Follow up With Details Comments Contact Info    Lissette Bhakta MD  Please call your primary care doctor for a follow-up on your blood pressure this week. [address] [facility]  [phone]      [facility] EMERGENCY DEPT  If symptoms worsen including new chest pain, shortness of breath or new onset weakness in face, arms or legs. [address] [place] [address]  [phone]        Return to ED if worse     Diagnosis     Clinical Impression:   1. Essential hypertension    2. Other headache syndrome        Attestations: This note is prepared by Myron Neff, acting as Scribe for Orlando Foley MD.    Orlando Foley MD: The scribe's documentation has been prepared under my direction and personally reviewed by me in its entirety. I confirm that the note above accurately reflects all work, treatment, procedures, and medical decision making performed by me.

## 2017-12-31 NOTE — ED NOTES
MD Roro Mercedes reviewed discharge instructions with the patient. The patient verbalized understanding. Patient discharged home with vitals at baseline. Patient ambulatory to vehicle with steady gait. No further complaints noted by patient at this time. Prior to discharge, patient requesting tylenol for headache. MD Made aware, and ordered tylenol for patient. Patient medicated prior to discharge.

## 2017-12-31 NOTE — ED NOTES
Patient reports to ED with complaints of a headache that has been off and on all week. Patient states she returned home from vacation this week and since, she has been unable to control her BP. She has been taking her medication as prescribed, however, she has been unable to get her BP to decrease. She states she monitors her BP daily, and she has not contacted her PCP. She denies weakness, chest pain, and SOB. Patient placed on the monitor x3, call bell within reach, side rails x2. Patient ambulatory in ED room.

## 2018-01-06 ENCOUNTER — HOSPITAL ENCOUNTER (EMERGENCY)
Age: 83
Discharge: HOME | End: 2018-01-06
Attending: EMERGENCY MEDICINE | Admitting: EMERGENCY MEDICINE
Payer: MEDICARE

## 2018-01-06 VITALS
HEART RATE: 86 BPM | HEIGHT: 60 IN | BODY MASS INDEX: 23.11 KG/M2 | OXYGEN SATURATION: 96 % | DIASTOLIC BLOOD PRESSURE: 68 MMHG | TEMPERATURE: 98.3 F | WEIGHT: 117.73 LBS | SYSTOLIC BLOOD PRESSURE: 154 MMHG | RESPIRATION RATE: 22 BRPM

## 2018-01-06 DIAGNOSIS — G44.209 TENSION HEADACHE: ICD-10-CM

## 2018-01-06 DIAGNOSIS — I10 ACCELERATED HYPERTENSION: Primary | ICD-10-CM

## 2018-01-06 LAB
ALBUMIN SERPL-MCNC: 3.6 G/DL (ref 3.5–5)
ALBUMIN/GLOB SERPL: 1 {RATIO} (ref 1.1–2.2)
ALP SERPL-CCNC: 110 U/L (ref 45–117)
ALT SERPL-CCNC: 24 U/L (ref 12–78)
ANION GAP SERPL CALC-SCNC: 4 MMOL/L (ref 5–15)
APPEARANCE UR: CLEAR
AST SERPL-CCNC: 25 U/L (ref 15–37)
BACTERIA URNS QL MICRO: NEGATIVE /HPF
BASOPHILS # BLD: 0 K/UL (ref 0–0.1)
BASOPHILS NFR BLD: 0 % (ref 0–1)
BILIRUB SERPL-MCNC: 0.4 MG/DL (ref 0.2–1)
BILIRUB UR QL: NEGATIVE
BUN SERPL-MCNC: 14 MG/DL (ref 6–20)
BUN/CREAT SERPL: 24 (ref 12–20)
CALCIUM SERPL-MCNC: 9.3 MG/DL (ref 8.5–10.1)
CHLORIDE SERPL-SCNC: 105 MMOL/L (ref 97–108)
CK SERPL-CCNC: 51 U/L (ref 26–192)
CO2 SERPL-SCNC: 31 MMOL/L (ref 21–32)
COLOR UR: ABNORMAL
CREAT SERPL-MCNC: 0.59 MG/DL (ref 0.55–1.02)
EOSINOPHIL # BLD: 0.4 K/UL (ref 0–0.4)
EOSINOPHIL NFR BLD: 5 % (ref 0–7)
EPITH CASTS URNS QL MICRO: ABNORMAL /LPF
ERYTHROCYTE [DISTWIDTH] IN BLOOD BY AUTOMATED COUNT: 14.3 % (ref 11.5–14.5)
GLOBULIN SER CALC-MCNC: 3.7 G/DL (ref 2–4)
GLUCOSE SERPL-MCNC: 80 MG/DL (ref 65–100)
GLUCOSE UR STRIP.AUTO-MCNC: NEGATIVE MG/DL
HCT VFR BLD AUTO: 42.5 % (ref 35–47)
HGB BLD-MCNC: 14.4 G/DL (ref 11.5–16)
HGB UR QL STRIP: NEGATIVE
INR PPP: 1.1 (ref 0.9–1.1)
KETONES UR QL STRIP.AUTO: NEGATIVE MG/DL
LEUKOCYTE ESTERASE UR QL STRIP.AUTO: ABNORMAL
LYMPHOCYTES # BLD: 1.8 K/UL (ref 0.8–3.5)
LYMPHOCYTES NFR BLD: 23 % (ref 12–49)
MCH RBC QN AUTO: 28.5 PG (ref 26–34)
MCHC RBC AUTO-ENTMCNC: 33.9 G/DL (ref 30–36.5)
MCV RBC AUTO: 84 FL (ref 80–99)
MONOCYTES # BLD: 0.9 K/UL (ref 0–1)
MONOCYTES NFR BLD: 12 % (ref 5–13)
NEUTS SEG # BLD: 4.7 K/UL (ref 1.8–8)
NEUTS SEG NFR BLD: 60 % (ref 32–75)
NITRITE UR QL STRIP.AUTO: NEGATIVE
PH UR STRIP: 7 [PH] (ref 5–8)
PLATELET # BLD AUTO: 161 K/UL (ref 150–400)
POTASSIUM SERPL-SCNC: 3.7 MMOL/L (ref 3.5–5.1)
PROT SERPL-MCNC: 7.3 G/DL (ref 6.4–8.2)
PROT UR STRIP-MCNC: NEGATIVE MG/DL
PROTHROMBIN TIME: 11.1 SEC (ref 9–11.1)
RBC # BLD AUTO: 5.06 M/UL (ref 3.8–5.2)
RBC #/AREA URNS HPF: ABNORMAL /HPF (ref 0–5)
SODIUM SERPL-SCNC: 140 MMOL/L (ref 136–145)
SP GR UR REFRACTOMETRY: 1.01 (ref 1–1.03)
TROPONIN I SERPL-MCNC: <0.04 NG/ML
UA: UC IF INDICATED,UAUC: ABNORMAL
UROBILINOGEN UR QL STRIP.AUTO: 1 EU/DL (ref 0.2–1)
WBC # BLD AUTO: 7.7 K/UL (ref 3.6–11)
WBC URNS QL MICRO: ABNORMAL /HPF (ref 0–4)

## 2018-01-06 PROCEDURE — 81001 URINALYSIS AUTO W/SCOPE: CPT | Performed by: EMERGENCY MEDICINE

## 2018-01-06 PROCEDURE — 36415 COLL VENOUS BLD VENIPUNCTURE: CPT | Performed by: EMERGENCY MEDICINE

## 2018-01-06 PROCEDURE — 99284 EMERGENCY DEPT VISIT MOD MDM: CPT

## 2018-01-06 PROCEDURE — 84484 ASSAY OF TROPONIN QUANT: CPT | Performed by: EMERGENCY MEDICINE

## 2018-01-06 PROCEDURE — 82550 ASSAY OF CK (CPK): CPT | Performed by: EMERGENCY MEDICINE

## 2018-01-06 PROCEDURE — 93005 ELECTROCARDIOGRAM TRACING: CPT

## 2018-01-06 PROCEDURE — 85610 PROTHROMBIN TIME: CPT | Performed by: EMERGENCY MEDICINE

## 2018-01-06 PROCEDURE — 85025 COMPLETE CBC W/AUTO DIFF WBC: CPT | Performed by: EMERGENCY MEDICINE

## 2018-01-06 PROCEDURE — 74011250636 HC RX REV CODE- 250/636: Performed by: EMERGENCY MEDICINE

## 2018-01-06 PROCEDURE — 80053 COMPREHEN METABOLIC PANEL: CPT | Performed by: EMERGENCY MEDICINE

## 2018-01-06 RX ORDER — ONDANSETRON 2 MG/ML
4 INJECTION INTRAMUSCULAR; INTRAVENOUS
Status: DISCONTINUED | OUTPATIENT
Start: 2018-01-06 | End: 2018-01-07 | Stop reason: HOSPADM

## 2018-01-06 RX ORDER — MORPHINE SULFATE 2 MG/ML
4 INJECTION, SOLUTION INTRAMUSCULAR; INTRAVENOUS
Status: DISCONTINUED | OUTPATIENT
Start: 2018-01-06 | End: 2018-01-07 | Stop reason: HOSPADM

## 2018-01-06 RX ORDER — HYDROCHLOROTHIAZIDE 25 MG/1
25 TABLET ORAL DAILY
Qty: 30 TAB | Refills: 0 | Status: SHIPPED | OUTPATIENT
Start: 2018-01-06 | End: 2018-02-05

## 2018-01-06 NOTE — ED PROVIDER NOTES
EMERGENCY DEPARTMENT HISTORY AND PHYSICAL EXAM      Date: 1/6/2018  Patient Name: Servando Ruiz    History of Presenting Illness     Chief Complaint   Patient presents with    Hypertension     Ambulatory with steady gait to triage. Reporting had checked blood pressure was 210/112 at home. Has had some some sob and a headache. Denies any changes in vision, chest pain, or n/v at this time    Headache     5/10 headache since this morning    Back Pain     Lower back pain of 5/10 radiating to lower abomen. Has had some associated frequency. Denies any dysuria or n/v at this time.  Abdominal Pain       History Provided By: Patient and Patient's Son    HPI: Servando Ruiz, 80 y.o. female with PMHx significant for aortic root dilatation, HTN, hypothyroidism, dyslipidemia, CAD, presents ambulatory to the ED with multiple complaints. She p/w initial complaint of constant, moderately severe elevated blood pressure with h/o HTN x one week. Patient was seen last week for the same sx in the ER and had HCTZ 12.5 mg added to her regimen. Pt's son notes that pt's systolic pressures have been between 170 and 180 this week, and was recorded at 210 today, despite taking her medications. She also c/o a generalized waxing and waning headache, currently rated 2/10 in severity that was present during her last ER visit, and had only slightly improved. Pt also c/o urinary frequency x 1 month, intermittent SOB x months, intermittent nonproductive cough of unknown onset, low back pain rated 4/10 in severity, and intermittent periumbilical abd pain with diffuse radiation 1 week. Pt specifically denies F/C, N/V, blurred vision, photophobia, numbness, tingling, CP, dysuria, constipation, leg pain, leg swelling, lightheaded, dizziness. Surgical hx is pertinent for oophorectomy and aortic aneurysm repair (2015).      Social hx: negative  Fhx: not pertinent for CA, HTN, DM     PCP: Allen Red MD    There are no other complaints, changes, or physical findings at this time. Current Facility-Administered Medications   Medication Dose Route Frequency Provider Last Rate Last Dose    morphine injection 4 mg  4 mg IntraVENous NOW Edmundo Zapata MD        ondansetron [facility]) injection 4 mg  4 mg IntraVENous NOW Edmundo Zapata MD         Current Outpatient Prescriptions   Medication Sig Dispense Refill    CRANBERRY FRUIT EXTRACT (CRANBERRY CONCENTRATE PO) Take  by mouth.  hydroCHLOROthiazide (HYDRODIURIL) 25 mg tablet Take 1 Tab by mouth daily for 30 days. 30 Tab 0    lisinopril (PRINIVIL, ZESTRIL) 40 mg tablet Take 40 mg by mouth daily.  sucralfate (CARAFATE) 1 gram tablet Take 1 g by mouth four (4) times daily.  atorvastatin (LIPITOR) 20 mg tablet Take 1 Tab by mouth daily. (Patient taking differently: Take 40 mg by mouth daily.) 90 Tab 3    multivitamins-minerals-lutein (CENTRUM SILVER) Tab Take 1 Tab by mouth.  brimonidine (ALPHAGAN) 0.15 % ophthalmic solution Administer 1 Drop to both eyes three (3) times daily.  levothyroxine (SYNTHROID) 125 mcg tablet Take  by mouth daily (before breakfast).  LATANOPROST (XALATAN OP) Apply 1 Drop to eye daily. Past History     Past Medical History:  Past Medical History:   Diagnosis Date    Aortic root dilatation (HCC)     mild    Arthritis     hands    CAD (coronary artery disease) 2001    started seeing Cardiologist 10 years ago for low pulse    Dyslipidemia     GERD (gastroesophageal reflux disease)     does not take anything other than Tums    HTN (hypertension)     Hypothyroidism     Thyroid disease        Past Surgical History:  Past Surgical History:   Procedure Laterality Date    UPPER GI ENDOSCOPY,CTRL BLEED  2/17/2017         VASCULAR SURGERY PROCEDURE UNLIST  2015    aortic aneurysm repair       Family History:  History reviewed. No pertinent family history.     Social History:  Social History   Substance Use Topics    Smoking status: Never Smoker    Smokeless tobacco: Never Used    Alcohol use No       Allergies: Allergies   Allergen Reactions    Antibiotic [Ldqhm-Nigvz-Yaddtzk-Pramoxine] Other (comments)     Unknown antibiotic begins with a \"C\"         Review of Systems   Review of Systems   Constitutional: Negative for chills and fever. HENT: Negative for congestion. Eyes: Negative for photophobia. Respiratory: Positive for cough. Negative for shortness of breath. Negative for sputum production    Cardiovascular: Negative for chest pain and leg swelling. Positive for elevated blood pressure   Gastrointestinal: Positive for abdominal pain. Negative for constipation, nausea and vomiting. Endocrine: Negative for heat intolerance. Genitourinary: Positive for frequency. Negative for dysuria, hematuria and urgency. Musculoskeletal: Positive for back pain. Skin: Negative for rash. Allergic/Immunologic: Negative for immunocompromised state. Neurological: Positive for headaches. Negative for weakness and numbness. Hematological: Does not bruise/bleed easily. Psychiatric/Behavioral: Negative. All other systems reviewed and are negative. Physical Exam   Physical Exam   Constitutional: She is oriented to person, place, and time. She appears well-developed and well-nourished. No distress. HENT:   Head: Normocephalic and atraumatic. Eyes: EOM are normal. Pupils are equal, round, and reactive to light. Neck: Normal range of motion. nontender   Cardiovascular: Normal rate, regular rhythm and normal heart sounds. No edema of b/l LE   Pulmonary/Chest: Effort normal and breath sounds normal. No respiratory distress. Abdominal: Soft. Bowel sounds are normal. She exhibits no mass. There is no tenderness. Musculoskeletal: Normal range of motion. She exhibits no edema or tenderness. Cervical back: She exhibits no tenderness. Thoracic back: She exhibits no tenderness.         Lumbar back: She exhibits no tenderness. Neurological: She is alert and oriented to person, place, and time. Coordination normal.   Sensorimotor intact     Skin: Skin is warm and dry. Psychiatric: She has a normal mood and affect. Her behavior is normal.   Nursing note and vitals reviewed. Diagnostic Study Results     Labs -     Recent Results (from the past 12 hour(s))   EKG, 12 LEAD, INITIAL    Collection Time: 01/06/18  5:10 PM   Result Value Ref Range    Ventricular Rate 86 BPM    Atrial Rate 86 BPM    P-R Interval 202 ms    QRS Duration 92 ms    Q-T Interval 402 ms    QTC Calculation (Bezet) 481 ms    Calculated P Axis 33 degrees    Calculated R Axis -54 degrees    Calculated T Axis 73 degrees    Diagnosis       Sinus rhythm with frequent and consecutive premature ventricular complexes  Left axis deviation  Left ventricular hypertrophy with repolarization abnormality  When compared with ECG of 09-JUL-2017 23:45,  premature ventricular complexes are now present  WA interval has decreased     CBC WITH AUTOMATED DIFF    Collection Time: 01/06/18  7:22 PM   Result Value Ref Range    WBC 7.7 3.6 - 11.0 K/uL    RBC 5.06 3.80 - 5.20 M/uL    HGB 14.4 11.5 - 16.0 g/dL    HCT 42.5 35.0 - 47.0 %    MCV 84.0 80.0 - 99.0 FL    MCH 28.5 26.0 - 34.0 PG    MCHC 33.9 30.0 - 36.5 g/dL    RDW 14.3 11.5 - 14.5 %    PLATELET 688 486 - 283 K/uL    NEUTROPHILS 60 32 - 75 %    LYMPHOCYTES 23 12 - 49 %    MONOCYTES 12 5 - 13 %    EOSINOPHILS 5 0 - 7 %    BASOPHILS 0 0 - 1 %    ABS. NEUTROPHILS 4.7 1.8 - 8.0 K/UL    ABS. LYMPHOCYTES 1.8 0.8 - 3.5 K/UL    ABS. MONOCYTES 0.9 0.0 - 1.0 K/UL    ABS. EOSINOPHILS 0.4 0.0 - 0.4 K/UL    ABS.  BASOPHILS 0.0 0.0 - 0.1 K/UL   METABOLIC PANEL, COMPREHENSIVE    Collection Time: 01/06/18  7:22 PM   Result Value Ref Range    Sodium 140 136 - 145 mmol/L    Potassium 3.7 3.5 - 5.1 mmol/L    Chloride 105 97 - 108 mmol/L    CO2 31 21 - 32 mmol/L    Anion gap 4 (L) 5 - 15 mmol/L    Glucose 80 65 - 100 mg/dL BUN 14 6 - 20 MG/DL    Creatinine 0.59 0.55 - 1.02 MG/DL    BUN/Creatinine ratio 24 (H) 12 - 20      GFR est AA >60 >60 ml/min/1.73m2    GFR est non-AA >60 >60 ml/min/1.73m2    Calcium 9.3 8.5 - 10.1 MG/DL    Bilirubin, total 0.4 0.2 - 1.0 MG/DL    ALT (SGPT) 24 12 - 78 U/L    AST (SGOT) 25 15 - 37 U/L    Alk. phosphatase 110 45 - 117 U/L    Protein, total 7.3 6.4 - 8.2 g/dL    Albumin 3.6 3.5 - 5.0 g/dL    Globulin 3.7 2.0 - 4.0 g/dL    A-G Ratio 1.0 (L) 1.1 - 2.2     PROTHROMBIN TIME + INR    Collection Time: 01/06/18  7:22 PM   Result Value Ref Range    INR 1.1 0.9 - 1.1      Prothrombin time 11.1 9.0 - 11.1 sec   CK    Collection Time: 01/06/18  7:22 PM   Result Value Ref Range    CK 51 26 - 192 U/L   TROPONIN I    Collection Time: 01/06/18  7:22 PM   Result Value Ref Range    Troponin-I, Qt. <0.04 <0.05 ng/mL   URINALYSIS W/ REFLEX CULTURE    Collection Time: 01/06/18  7:41 PM   Result Value Ref Range    Color YELLOW/STRAW      Appearance CLEAR CLEAR      Specific gravity 1.015 1.003 - 1.030      pH (UA) 7.0 5.0 - 8.0      Protein NEGATIVE  NEG mg/dL    Glucose NEGATIVE  NEG mg/dL    Ketone NEGATIVE  NEG mg/dL    Bilirubin NEGATIVE  NEG      Blood NEGATIVE  NEG      Urobilinogen 1.0 0.2 - 1.0 EU/dL    Nitrites NEGATIVE  NEG      Leukocyte Esterase TRACE (A) NEG      WBC 0-4 0 - 4 /hpf    RBC 0-5 0 - 5 /hpf    Epithelial cells FEW FEW /lpf    Bacteria NEGATIVE  NEG /hpf    UA:UC IF INDICATED CULTURE NOT INDICATED BY UA RESULT CNI         Radiologic Studies -   No orders to display     CT Results  (Last 48 hours)    None        CXR Results  (Last 48 hours)    None            Medical Decision Making   I am the first provider for this patient. I reviewed the vital signs, available nursing notes, past medical history, past surgical history, family history and social history. Vital Signs-Reviewed the patient's vital signs.   Patient Vitals for the past 12 hrs:   Temp Pulse Resp BP SpO2   01/06/18 2036 - 86 22 - 96 %   01/06/18 2030 - - - 154/68 96 %   01/06/18 2000 - 83 21 (!) 153/92 95 %   01/06/18 1945 - 86 17 183/73 98 %   01/06/18 1700 98.3 °F (36.8 °C) 92 16 (!) 204/123 97 %       Pulse Oximetry Analysis - 97% on RA    Cardiac Monitor:   Rate: 90 bpm  Rhythm: Normal Sinus Rhythm      EKG interpretation: (Preliminary) 1710  Rhythm: sinus rhythm with frequent and consecutive PVCs; and regular . Rate (approx.): 86; Axis: left axis deviation; NE interval: normal; QRS interval: normal ; ST/T wave: normal; Other findings: LVH. No significant change from prior EKG. Written by BENNETT Noonanibe, as dictated by Ayaka Carrillo MD.    Records Reviewed: Nursing Notes, Old Medical Records, Previous electrocardiograms and Previous Radiology Studies    Provider Notes (Medical Decision Making):   DDx: accelerated HTN, tension HA, UTI, constipation, CAD    ED Course:   Initial assessment performed. The patients presenting problems have been discussed, and they are in agreement with the care plan formulated and outlined with them. I have encouraged them to ask questions as they arise throughout their visit. 7:30 PM  Patient ambulating in department without any difficulty. 7:34 PM  Patient's past medical records were reviewed for pertinent information pertaining to today's visit. Pt had a negative head CT taken 12/31/2017.      8:34 PM  On reevaluation, patient reports feeling improved and wishes to be discharged home. Updated and counseled on available results, addressed questions. Patient expresses understanding and agrees with plan. Written by BENNETT Noonan, as dictated by . Critical Care Time: none    Disposition:    Discharge Note:  8:39 PM  The pt is ready for discharge. The pt's signs, symptoms, diagnosis, and discharge instructions have been discussed and pt has conveyed their understanding. The pt is to follow up as recommended or return to ER should their symptoms worsen.  Plan has been discussed and pt is in agreement. PLAN:  1. Current Discharge Medication List      CONTINUE these medications which have CHANGED    Details   hydroCHLOROthiazide (HYDRODIURIL) 25 mg tablet Take 1 Tab by mouth daily for 30 days. Qty: 30 Tab, Refills: 0           2. Follow-up Information     Follow up With Details Comments [address], [place] In 2 days  [address] [facility] [address]  [phone]      [facility] EMERGENCY DEPT  If symptoms worsen [address] [place] [address]  [phone]        Return to ED if worse     Diagnosis     Clinical Impression:   1. Accelerated hypertension    2. Tension headache        Attestations: This note is prepared by Shaunna Barroso, acting as Scribe for Nathalie Mo MD.       The scribe's documentation has been prepared under my direction and personally reviewed by me in its entirety. I confirm that the note above accurately reflects all work, treatment, procedures, and medical decision making performed by me.

## 2018-01-07 LAB
ATRIAL RATE: 86 BPM
CALCULATED P AXIS, ECG09: 33 DEGREES
CALCULATED R AXIS, ECG10: -54 DEGREES
CALCULATED T AXIS, ECG11: 73 DEGREES
DIAGNOSIS, 93000: NORMAL
P-R INTERVAL, ECG05: 202 MS
Q-T INTERVAL, ECG07: 402 MS
QRS DURATION, ECG06: 92 MS
QTC CALCULATION (BEZET), ECG08: 481 MS
VENTRICULAR RATE, ECG03: 86 BPM

## 2018-01-07 NOTE — ED NOTES
Patient discharged and given discharge instructions by Marilee Warren MD. Patient had an opportunity to ask questions. Patient verbalized understanding of discharge instructions. Patient d/c from ED ambulatory, discharge instructions and prescriptions in hand. Patient accompanied by family. Pt refused a wheelchair.

## 2018-01-07 NOTE — ED NOTES
Patient presents to ED with C/O lower back pain, non-productive cough, pertension, abd pain, SOB, urinary frequency and a H/A that started months ago. The pt stated she took her BP today her systolic was over 405 and diastolic over 990. The pt stated she was seen in this ED on Sunday for hypertension. The pt stated she was rx'd and antihypertensive medication, which she has been taking. The pt denies dizziness. N/V/D, chest pain, numbness/tingling, weakness in extremities, fever, and chills. Patient is A&Ox3, side rails up, call bell w/in reach, and aware of plan of care. The patient is in NAD. Family at the bedside.

## 2018-01-07 NOTE — DISCHARGE INSTRUCTIONS
Tension Headache: Care Instructions  Your Care Instructions  Most headaches are tension headaches. These headaches tend to happen again, especially if you are under stress. A tension headache may cause pain or a feeling of pressure all over your head. You probably can't pinpoint the center of the pain. If you keep getting tension headaches, the best thing you can do to limit them is to find out what is causing them and then make changes in those areas. Follow-up care is a key part of your treatment and safety. Be sure to make and go to all appointments, and call your doctor if you are having problems. It's also a good idea to know your test results and keep a list of the medicines you take. How can you care for yourself at home? · Rest in a quiet, dark room with a cool cloth on your forehead until your headache is gone. Close your eyes, and try to relax or go to sleep. Don't watch TV or read. Avoid using the computer. · Use a warm, moist towel or a heating pad set on low to relax tight shoulder and neck muscles. · Have someone gently massage your neck and shoulders. · Take pain medicines exactly as directed. ¨ If the doctor gave you a prescription medicine for pain, take it as prescribed. ¨ If you are not taking a prescription pain medicine, ask your doctor if you can take an over-the-counter medicine. · Be careful not to take pain medicine more often than the instructions allow, because you may get worse or more frequent headaches when the medicine wears off. · If you get another tension headache, stop what you are doing and sit quietly for a moment. Close your eyes and breathe slowly. Try to relax your head and neck muscles. · Do not ignore new symptoms that occur with a headache, such as fever, weakness or numbness, vision changes, or confusion. These may be signs of a more serious problem. To help prevent headaches  · Keep a headache diary so you can figure out what triggers your headaches. Avoiding triggers may help you prevent headaches. Record when each headache began, how long it lasted, and what the pain was like (throbbing, aching, stabbing, or dull). List anything that may have triggered the headache, such as being physically or emotionally stressed or being anxious or depressed. Other possible triggers are hunger, anger, fatigue, poor posture, and muscle strain. · Find healthy ways to deal with stress. Headaches are most common during or right after stressful times. Take time to relax before and after you do something that has caused a headache in the past.  · Exercise daily to relieve stress. Relaxation exercises may help reduce tension. · Get plenty of sleep. · Eat regularly and well. Long periods without food can trigger a headache. · Treat yourself to a massage. Some people find that massages are very helpful in relieving tension. · Try to keep your muscles relaxed by keeping good posture. Check your jaw, face, neck, and shoulder muscles for tension, and try to relax them. When sitting at a desk, change positions often, and stretch for 30 seconds each hour. · Reduce eyestrain from computers by blinking frequently and looking away from the computer screen every so often. Make sure you have proper eyewear and that your monitor is set up properly, about an arm's length away. When should you call for help? Call 911 anytime you think you may need emergency care. For example, call if:  ? · You have signs of a stroke. These may include:  ¨ Sudden numbness, paralysis, or weakness in your face, arm, or leg, especially on only one side of your body. ¨ Sudden vision changes. ¨ Sudden trouble speaking. ¨ Sudden confusion or trouble understanding simple statements. ¨ Sudden problems with walking or balance. ¨ A sudden, severe headache that is different from past headaches. ?Call your doctor now or seek immediate medical care if:  ? · You have new or worse nausea and vomiting.    ? · You have a new or higher fever. ? · Your headache gets much worse. ? Watch closely for changes in your health, and be sure to contact your doctor if:  ? · You are not getting better after 2 days (48 hours). Where can you learn more? Go to http://graeme-vlad.info/. Enter 84 17 13 in the search box to learn more about \"Tension Headache: Care Instructions. \"  Current as of: October 14, 2016  Content Version: 11.4  © 1199-7056 [employer]. Care instructions adapted under license by [employer] (which disclaims liability or warranty for this information). If you have questions about a medical condition or this instruction, always ask your healthcare professional. [place] [address] any warranty or liability for your use of this information. Acute High Blood Pressure: Care Instructions  Your Care Instructions    Acute high blood pressure is very high blood pressure. It's a serious problem. Very high blood pressure can damage your brain, heart, eyes, and kidneys. You may have been given medicines to lower your blood pressure. You may have gotten them as pills or through a needle in one of your veins. This is called an IV. And maybe you were given other medicines too. These can be needed when high blood pressure causes other problems. To keep your blood pressure at a lower level, you may need to make healthy lifestyle changes. And you will probably need to take medicines. Be sure to follow up with your doctor about your blood pressure and what you can do about it. Follow-up care is a key part of your treatment and safety. Be sure to make and go to all appointments, and call your doctor if you are having problems. It's also a good idea to know your test results and keep a list of the medicines you take. How can you care for yourself at home? · See your doctor as often as he or she recommends. This is to make sure your blood pressure is under control.  You will probably go at least 2 times a year. But it may be more often at first.  · Take your blood pressure medicine exactly as prescribed. You may take one or more types. They include diuretics, beta-blockers, ACE inhibitors, calcium channel blockers, and angiotensin II receptor blockers. Call your doctor if you think you are having a problem with your medicine. · If you take blood pressure medicine, talk to your doctor before you take decongestants or anti-inflammatory medicine, such as ibuprofen. These can raise blood pressure. · Learn how to check your blood pressure at home. Check it often. · Ask your doctor if it's okay to drink alcohol. · Talk to your doctor about lifestyle changes that can help blood pressure. These include being active and not smoking. When should you call for help? Call 911 anytime you think you may need emergency care. This may mean having symptoms that suggest that your blood pressure is causing a serious heart or blood vessel problem. Your blood pressure may be over 180/110. ? For example, call 911 if:  ? · You have symptoms of a heart attack. These may include:  ¨ Chest pain or pressure, or a strange feeling in the chest.  ¨ Sweating. ¨ Shortness of breath. ¨ Nausea or vomiting. ¨ Pain, pressure, or a strange feeling in the back, neck, jaw, or upper belly or in one or both shoulders or arms. ¨ Lightheadedness or sudden weakness. ¨ A fast or irregular heartbeat. ? · You have symptoms of a stroke. These may include:  ¨ Sudden numbness, tingling, weakness, or loss of movement in your face, arm, or leg, especially on only one side of your body. ¨ Sudden vision changes. ¨ Sudden trouble speaking. ¨ Sudden confusion or trouble understanding simple statements. ¨ Sudden problems with walking or balance. ¨ A sudden, severe headache that is different from past headaches. ? · You have severe back or belly pain. ?Do not wait until your blood pressure comes down on its own.  Get help right away. ?Call your doctor now or seek immediate care if:  ? · Your blood pressure is much higher than normal (such as 180/110 or higher), but you don't have symptoms. ? · You think high blood pressure is causing symptoms, such as:  ¨ Severe headache. ¨ Blurry vision. ? Watch closely for changes in your health, and be sure to contact your doctor if:  ? · Your blood pressure measures 140/90 or higher at least 2 times. That means the top number is 140 or higher or the bottom number is 90 or higher, or both. ? · You think you may be having side effects from your blood pressure medicine. ? · Your blood pressure is usually normal, but it goes above normal at least 2 times. Where can you learn more? Go to http://graeme-vlad.info/. Enter X519 in the search box to learn more about \"Acute High Blood Pressure: Care Instructions. \"  Current as of: September 21, 2016  Content Version: 11.4  © 5562-5621 [employer]. Care instructions adapted under license by [employer] (which disclaims liability or warranty for this information). If you have questions about a medical condition or this instruction, always ask your healthcare professional. [place] [address] any warranty or liability for your use of this information.

## 2018-12-10 ENCOUNTER — APPOINTMENT (OUTPATIENT)
Dept: GENERAL RADIOLOGY | Age: 83
End: 2018-12-10
Attending: EMERGENCY MEDICINE
Payer: MEDICARE

## 2018-12-10 ENCOUNTER — APPOINTMENT (OUTPATIENT)
Dept: CT IMAGING | Age: 83
End: 2018-12-10
Attending: EMERGENCY MEDICINE
Payer: MEDICARE

## 2018-12-10 ENCOUNTER — APPOINTMENT (OUTPATIENT)
Dept: ULTRASOUND IMAGING | Age: 83
End: 2018-12-10
Attending: EMERGENCY MEDICINE
Payer: MEDICARE

## 2018-12-10 ENCOUNTER — HOSPITAL ENCOUNTER (EMERGENCY)
Age: 83
Discharge: HOME | End: 2018-12-10
Attending: EMERGENCY MEDICINE
Payer: MEDICARE

## 2018-12-10 VITALS
TEMPERATURE: 97.9 F | RESPIRATION RATE: 17 BRPM | WEIGHT: 115.74 LBS | BODY MASS INDEX: 26.79 KG/M2 | HEART RATE: 80 BPM | SYSTOLIC BLOOD PRESSURE: 157 MMHG | HEIGHT: 55 IN | OXYGEN SATURATION: 97 % | DIASTOLIC BLOOD PRESSURE: 90 MMHG

## 2018-12-10 DIAGNOSIS — M79.605 LEFT LEG PAIN: ICD-10-CM

## 2018-12-10 DIAGNOSIS — R09.89 LABILE HYPERTENSION: Primary | ICD-10-CM

## 2018-12-10 LAB
ALBUMIN SERPL-MCNC: 3.7 G/DL (ref 3.5–5)
ALBUMIN/GLOB SERPL: 0.9 {RATIO} (ref 1.1–2.2)
ALP SERPL-CCNC: 97 U/L (ref 45–117)
ALT SERPL-CCNC: 20 U/L (ref 12–78)
ANION GAP SERPL CALC-SCNC: 5 MMOL/L (ref 5–15)
APPEARANCE UR: CLEAR
AST SERPL-CCNC: 22 U/L (ref 15–37)
BACTERIA URNS QL MICRO: NEGATIVE /HPF
BASOPHILS # BLD: 0 K/UL (ref 0–0.1)
BASOPHILS NFR BLD: 1 % (ref 0–1)
BILIRUB SERPL-MCNC: 0.3 MG/DL (ref 0.2–1)
BILIRUB UR QL: NEGATIVE
BNP SERPL-MCNC: 1021 PG/ML (ref 0–450)
BUN SERPL-MCNC: 6 MG/DL (ref 6–20)
BUN/CREAT SERPL: 10 (ref 12–20)
CALCIUM SERPL-MCNC: 9.3 MG/DL (ref 8.5–10.1)
CHLORIDE SERPL-SCNC: 106 MMOL/L (ref 97–108)
CK SERPL-CCNC: 68 U/L (ref 26–192)
CO2 SERPL-SCNC: 29 MMOL/L (ref 21–32)
COLOR UR: ABNORMAL
CREAT SERPL-MCNC: 0.59 MG/DL (ref 0.55–1.02)
D DIMER PPP FEU-MCNC: 2.72 MG/L FEU (ref 0–0.65)
DIFFERENTIAL METHOD BLD: NORMAL
EOSINOPHIL # BLD: 0.3 K/UL (ref 0–0.4)
EOSINOPHIL NFR BLD: 4 % (ref 0–7)
EPITH CASTS URNS QL MICRO: ABNORMAL /LPF
ERYTHROCYTE [DISTWIDTH] IN BLOOD BY AUTOMATED COUNT: 14 % (ref 11.5–14.5)
GLOBULIN SER CALC-MCNC: 4 G/DL (ref 2–4)
GLUCOSE SERPL-MCNC: 89 MG/DL (ref 65–100)
GLUCOSE UR STRIP.AUTO-MCNC: NEGATIVE MG/DL
HCT VFR BLD AUTO: 43.8 % (ref 35–47)
HGB BLD-MCNC: 14.8 G/DL (ref 11.5–16)
HGB UR QL STRIP: NEGATIVE
HYALINE CASTS URNS QL MICRO: ABNORMAL /LPF (ref 0–5)
IMM GRANULOCYTES # BLD: 0 K/UL (ref 0–0.04)
IMM GRANULOCYTES NFR BLD AUTO: 0 % (ref 0–0.5)
KETONES UR QL STRIP.AUTO: NEGATIVE MG/DL
LEUKOCYTE ESTERASE UR QL STRIP.AUTO: ABNORMAL
LYMPHOCYTES # BLD: 2 K/UL (ref 0.8–3.5)
LYMPHOCYTES NFR BLD: 30 % (ref 12–49)
MCH RBC QN AUTO: 28.6 PG (ref 26–34)
MCHC RBC AUTO-ENTMCNC: 33.8 G/DL (ref 30–36.5)
MCV RBC AUTO: 84.6 FL (ref 80–99)
MONOCYTES # BLD: 0.7 K/UL (ref 0–1)
MONOCYTES NFR BLD: 11 % (ref 5–13)
NEUTS SEG # BLD: 3.6 K/UL (ref 1.8–8)
NEUTS SEG NFR BLD: 54 % (ref 32–75)
NITRITE UR QL STRIP.AUTO: NEGATIVE
NRBC # BLD: 0 K/UL (ref 0–0.01)
NRBC BLD-RTO: 0 PER 100 WBC
PH UR STRIP: 7.5 [PH] (ref 5–8)
PLATELET # BLD AUTO: 181 K/UL (ref 150–400)
PMV BLD AUTO: 10.4 FL (ref 8.9–12.9)
POTASSIUM SERPL-SCNC: 3.9 MMOL/L (ref 3.5–5.1)
PROT SERPL-MCNC: 7.7 G/DL (ref 6.4–8.2)
PROT UR STRIP-MCNC: NEGATIVE MG/DL
RBC # BLD AUTO: 5.18 M/UL (ref 3.8–5.2)
RBC #/AREA URNS HPF: ABNORMAL /HPF (ref 0–5)
SODIUM SERPL-SCNC: 140 MMOL/L (ref 136–145)
SP GR UR REFRACTOMETRY: 1.01 (ref 1–1.03)
TROPONIN I SERPL-MCNC: <0.05 NG/ML
UROBILINOGEN UR QL STRIP.AUTO: 0.2 EU/DL (ref 0.2–1)
WBC # BLD AUTO: 6.6 K/UL (ref 3.6–11)
WBC URNS QL MICRO: ABNORMAL /HPF (ref 0–4)

## 2018-12-10 PROCEDURE — 70450 CT HEAD/BRAIN W/O DYE: CPT

## 2018-12-10 PROCEDURE — 82550 ASSAY OF CK (CPK): CPT

## 2018-12-10 PROCEDURE — 85025 COMPLETE CBC W/AUTO DIFF WBC: CPT

## 2018-12-10 PROCEDURE — 83880 ASSAY OF NATRIURETIC PEPTIDE: CPT

## 2018-12-10 PROCEDURE — 99284 EMERGENCY DEPT VISIT MOD MDM: CPT

## 2018-12-10 PROCEDURE — 81001 URINALYSIS AUTO W/SCOPE: CPT

## 2018-12-10 PROCEDURE — 93971 EXTREMITY STUDY: CPT

## 2018-12-10 PROCEDURE — 36415 COLL VENOUS BLD VENIPUNCTURE: CPT

## 2018-12-10 PROCEDURE — 84484 ASSAY OF TROPONIN QUANT: CPT

## 2018-12-10 PROCEDURE — 85379 FIBRIN DEGRADATION QUANT: CPT

## 2018-12-10 PROCEDURE — 80053 COMPREHEN METABOLIC PANEL: CPT

## 2018-12-10 PROCEDURE — 71045 X-RAY EXAM CHEST 1 VIEW: CPT

## 2018-12-10 RX ORDER — LISINOPRIL 5 MG/1
5 TABLET ORAL DAILY
COMMUNITY

## 2018-12-10 RX ORDER — AMLODIPINE BESYLATE 2.5 MG/1
5 TABLET ORAL DAILY
Qty: 30 TAB | Refills: 0 | Status: SHIPPED | OUTPATIENT
Start: 2018-12-10

## 2018-12-10 RX ORDER — HYDROCHLOROTHIAZIDE 12.5 MG/1
12.5 TABLET ORAL DAILY
COMMUNITY

## 2018-12-10 RX ORDER — CLOPIDOGREL BISULFATE 75 MG/1
75 TABLET ORAL
COMMUNITY

## 2018-12-10 RX ORDER — AMLODIPINE BESYLATE 2.5 MG/1
2.5 TABLET ORAL DAILY
COMMUNITY
End: 2018-12-10

## 2018-12-10 RX ORDER — LISINOPRIL 20 MG/1
40 TABLET ORAL DAILY
COMMUNITY

## 2018-12-10 NOTE — ED NOTES
Assumed care of pt ambulatory from triage. Pt reports headache that started this morning. Reports prompted her to check her BP and states reading of 170/100. Patient reports she is unsure if she has been taking BP medications correctly and admits that she just takes when she feels her BP is elevated. Pt also reports intermittent numbness to L leg x 2-3 weeks and pain to lateral aspect of L calf. Pt placed on cardiac monitor x3. VSS. Pt in position of comfort with no signs of acute distress noted. Family at bedside.

## 2018-12-10 NOTE — ED PROVIDER NOTES
EMERGENCY DEPARTMENT HISTORY AND PHYSICAL EXAM 
 
 
Date: 12/10/2018 Patient Name: Rajesh Brandon History of Presenting Illness Chief Complaint Patient presents with  Hypertension Pt ambualtory to triage with c/o hypetension after checking at home today-170/100; daughter states, \"I think shes taking her blood pressure medication wrong\"; pt states taking medication last night; pt with c/o intermittent headache x 2 days  Leg Pain LLE pain, tingling x 3 weeks History Provided By: Patient and Patient's Daughter HPI: Rajesh Brandon, 80 y.o. female with PMHx significant for aortic root dilatation, HTN, hypothyroidism, arthritis, GERD, presents ambulatory to the ED with cc of multiple episodes of high blood pressure readings x 3 weeks. Pt reports an associated symptom of a headache radiating to her neck, which had resolved and daughter notes an unsteady gait (pt denies this currently). She states the highest recorded BP was 200/95 a couple days ago and the lowest BP was 149/60. Pt denies missing any doses and states she would take an extra dose if her reading is high. She denies informing her PCP of her symptoms as she had went into office and they state they were not going to make any changes to her medications. Daughter interjects and states the PCP is aware as this has been an ongoing issue and would not make any changes in her medications as it would bring her BP too low. Today, the daughter states the pt's BP was 170/100. Pt is also complaining of a L leg pain x 3 weeks with associated intermittent tingling. She notes the pain occurs \"sometimes\" in the lateral calf. She denies any recent injuries or swelling. When asked about any SOB, the pt notes she is SOB at baseline. She further reports she would get dizzy with deep inspiration x 3 weeks. Pt is also on 81 mg ASA and Plavix. Pt denies any CP. Social hx: (-) tobacco, (-) alcohol FHx: DM 
 
 There are no other complaints, changes, or physical findings at this time. PCP: Maki Castillo MD 
 
Current Outpatient Medications Medication Sig Dispense Refill  lisinopril (PRINIVIL, ZESTRIL) 20 mg tablet Take 40 mg by mouth daily.  lisinopril (PRINIVIL, ZESTRIL) 5 mg tablet Take 5 mg by mouth daily.  hydroCHLOROthiazide (HYDRODIURIL) 12.5 mg tablet Take 12.5 mg by mouth daily.  clopidogrel (PLAVIX) 75 mg tab Take 75 mg by mouth.  amLODIPine (NORVASC) 2.5 mg tablet Take 2 Tabs by mouth daily. 30 Tab 0  CRANBERRY FRUIT EXTRACT (CRANBERRY CONCENTRATE PO) Take  by mouth.  sucralfate (CARAFATE) 1 gram tablet Take 1 g by mouth four (4) times daily.  atorvastatin (LIPITOR) 20 mg tablet Take 1 Tab by mouth daily. (Patient taking differently: Take 40 mg by mouth daily.) 90 Tab 3  
 multivitamins-minerals-lutein (CENTRUM SILVER) Tab Take 1 Tab by mouth.  levothyroxine (SYNTHROID) 125 mcg tablet Take  by mouth daily (before breakfast).  LATANOPROST (XALATAN OP) Apply 1 Drop to eye daily.  brimonidine (ALPHAGAN) 0.15 % ophthalmic solution Administer 1 Drop to both eyes three (3) times daily. Past History Past Medical History: 
Past Medical History:  
Diagnosis Date  Aortic root dilatation (HCC) mild  Arthritis   
 hands  CAD (coronary artery disease) 2001  
 started seeing Cardiologist 10 years ago for low pulse  Dyslipidemia  GERD (gastroesophageal reflux disease) does not take anything other than Tums  HTN (hypertension)  Hypothyroidism  Thyroid disease Past Surgical History: 
Past Surgical History:  
Procedure Laterality Date  UPPER GI ENDOSCOPY,CTRL BLEED  2/17/2017  VASCULAR SURGERY PROCEDURE UNLIST  2015  
 aortic aneurysm repair Family History: 
History reviewed. No pertinent family history. Social History: 
Social History Tobacco Use  Smoking status: Never Smoker  Smokeless tobacco: Never Used Substance Use Topics  Alcohol use: No  
 Drug use: No  
 
 
Allergies: Allergies Allergen Reactions  Antibiotic [Ziphd-Ypfzp-Jgnyshp-Pramoxine] Other (comments) Unknown antibiotic begins with a \"C\" Review of Systems Review of Systems Constitutional: Negative for fever. HENT: Negative for congestion. Eyes: Negative. Respiratory: Positive for shortness of breath (+baseline). Cardiovascular: Negative for chest pain and leg swelling. Gastrointestinal: Negative for abdominal pain. Endocrine: Negative for heat intolerance. Genitourinary: Negative for dysuria. Musculoskeletal: Positive for myalgias (+LLE) and neck pain. +tingling LLE Skin: Negative for rash. Allergic/Immunologic: Negative for immunocompromised state. Neurological: Positive for dizziness and headaches. Hematological: Does not bruise/bleed easily. Psychiatric/Behavioral: Negative. All other systems reviewed and are negative. Physical Exam  
Physical Exam  
Constitutional: She is oriented to person, place, and time. She appears well-developed and well-nourished. No distress. No acute distress HENT:  
Head: Normocephalic and atraumatic. Eyes: EOM are normal. Pupils are equal, round, and reactive to light. Right eye exhibits no nystagmus. Left eye exhibits no nystagmus. Neck: Normal range of motion. Neck supple. Cardiovascular: Normal rate, regular rhythm and normal heart sounds. Pulmonary/Chest: Effort normal and breath sounds normal. No respiratory distress. Abdominal: Soft. Bowel sounds are normal. She exhibits no mass. There is no tenderness. Musculoskeletal: Normal range of motion. She exhibits no edema. Legs non-tender, no edema Neurological: She is alert and oriented to person, place, and time. Coordination normal.  
Sensory and motor intact Skin: Skin is warm and dry. Psychiatric: She has a normal mood and affect. Her behavior is normal.  
Nursing note and vitals reviewed. Diagnostic Study Results Labs - Recent Results (from the past 12 hour(s)) CBC WITH AUTOMATED DIFF Collection Time: 12/10/18  4:36 PM  
Result Value Ref Range WBC 6.6 3.6 - 11.0 K/uL  
 RBC 5.18 3.80 - 5.20 M/uL  
 HGB 14.8 11.5 - 16.0 g/dL HCT 43.8 35.0 - 47.0 % MCV 84.6 80.0 - 99.0 FL  
 MCH 28.6 26.0 - 34.0 PG  
 MCHC 33.8 30.0 - 36.5 g/dL  
 RDW 14.0 11.5 - 14.5 % PLATELET 583 533 - 477 K/uL MPV 10.4 8.9 - 12.9 FL  
 NRBC 0.0 0  WBC ABSOLUTE NRBC 0.00 0.00 - 0.01 K/uL NEUTROPHILS 54 32 - 75 % LYMPHOCYTES 30 12 - 49 % MONOCYTES 11 5 - 13 % EOSINOPHILS 4 0 - 7 % BASOPHILS 1 0 - 1 % IMMATURE GRANULOCYTES 0 0.0 - 0.5 % ABS. NEUTROPHILS 3.6 1.8 - 8.0 K/UL  
 ABS. LYMPHOCYTES 2.0 0.8 - 3.5 K/UL  
 ABS. MONOCYTES 0.7 0.0 - 1.0 K/UL  
 ABS. EOSINOPHILS 0.3 0.0 - 0.4 K/UL  
 ABS. BASOPHILS 0.0 0.0 - 0.1 K/UL  
 ABS. IMM. GRANS. 0.0 0.00 - 0.04 K/UL  
 DF AUTOMATED METABOLIC PANEL, COMPREHENSIVE Collection Time: 12/10/18  4:36 PM  
Result Value Ref Range Sodium 140 136 - 145 mmol/L Potassium 3.9 3.5 - 5.1 mmol/L Chloride 106 97 - 108 mmol/L  
 CO2 29 21 - 32 mmol/L Anion gap 5 5 - 15 mmol/L Glucose 89 65 - 100 mg/dL BUN 6 6 - 20 MG/DL Creatinine 0.59 0.55 - 1.02 MG/DL  
 BUN/Creatinine ratio 10 (L) 12 - 20 GFR est AA >60 >60 ml/min/1.73m2 GFR est non-AA >60 >60 ml/min/1.73m2 Calcium 9.3 8.5 - 10.1 MG/DL Bilirubin, total 0.3 0.2 - 1.0 MG/DL  
 ALT (SGPT) 20 12 - 78 U/L  
 AST (SGOT) 22 15 - 37 U/L Alk. phosphatase 97 45 - 117 U/L Protein, total 7.7 6.4 - 8.2 g/dL Albumin 3.7 3.5 - 5.0 g/dL Globulin 4.0 2.0 - 4.0 g/dL A-G Ratio 0.9 (L) 1.1 - 2.2 NT-PRO BNP Collection Time: 12/10/18  4:36 PM  
Result Value Ref Range NT pro-BNP 1,021 (H) 0 - 450 PG/ML  
CK  Collection Time: 12/10/18  4:36 PM  
 Result Value Ref Range CK 68 26 - 192 U/L  
TROPONIN I Collection Time: 12/10/18  4:36 PM  
Result Value Ref Range Troponin-I, Qt. <0.05 <0.05 ng/mL D DIMER Collection Time: 12/10/18  4:36 PM  
Result Value Ref Range D-dimer 2.72 (H) 0.00 - 0.65 mg/L FEU  
URINALYSIS W/ RFLX MICROSCOPIC Collection Time: 12/10/18  7:03 PM  
Result Value Ref Range Color YELLOW/STRAW Appearance CLEAR CLEAR Specific gravity 1.006 1.003 - 1.030    
 pH (UA) 7.5 5.0 - 8.0 Protein NEGATIVE  NEG mg/dL Glucose NEGATIVE  NEG mg/dL Ketone NEGATIVE  NEG mg/dL Bilirubin NEGATIVE  NEG Blood NEGATIVE  NEG Urobilinogen 0.2 0.2 - 1.0 EU/dL Nitrites NEGATIVE  NEG Leukocyte Esterase TRACE (A) NEG    
 WBC 0-4 0 - 4 /hpf  
 RBC 0-5 0 - 5 /hpf Epithelial cells FEW FEW /lpf Bacteria NEGATIVE  NEG /hpf Hyaline cast 0-2 0 - 5 /lpf Radiologic Studies -  
DUPLEX LOWER EXT VENOUS LEFT Final Result IMPRESSION:  
  
1. No deep venous thrombosis. CT Results  (Last 48 hours) 12/10/18 1734  CT HEAD WO CONT Final result Impression:  Impression: 1. No evidence of acute infarct or intracranial hemorrhage. 2. Mild periventricular white matter disease is likely secondary to chronic  
small vessel ischemic changes. Narrative: Indication:  headache/off balance Comparison: CT 12/31/2017 Findings: 5 mm axial images were obtained from the skull base through the  
vertex. CT dose reduction was achieved through the use of a standardized protocol  
tailored for this examination and automatic exposure control for dose  
modulation. The ventricles and cortical sulci are prominent, compatible with age related  
volume loss. There is no evidence of intracranial hemorrhage, mass, mass effect,  
or acute infarct. There is periventricular white matter disease.  No extra-axial  
 fluid collections are seen. The visualized paranasal sinuses and mastoid air  
cells are clear. The orbital structures are unremarkable. No osseous  
abnormalities are seen. CXR Results  (Last 48 hours) 12/10/18 1811  XR CHEST PORT Final result Impression:  IMPRESSION: No evidence of acute cardiopulmonary process. Narrative:  INDICATION:  sob COMPARISON: 7/10/2017 FINDINGS: Single AP portable view of the chest obtained at 1803 demonstrates a  
stable cardiomediastinal silhouette. There is chronic cardiomegaly. The lungs  
are clear bilaterally. No osseous abnormalities are seen. Medical Decision Making I am the first provider for this patient. I reviewed the vital signs, available nursing notes, past medical history, past surgical history, family history and social history. Vital Signs-Reviewed the patient's vital signs. Patient Vitals for the past 12 hrs: 
 Temp Pulse Resp BP SpO2  
12/10/18 1830  80 17 157/90 97 % 12/10/18 1819  75 12 (!) 158/93 98 % 12/10/18 1525 97.9 °F (36.6 °C) 60 16 177/87 98 % Records Reviewed: Nursing Notes, Old Medical Records, Previous Radiology Studies and Previous Laboratory Studies Provider Notes (Medical Decision Making): DDx: labile HTN, electrolyte abnormality, DVT, Baker's cyst, tension HA, CVA, TIA, anemia, arrhythmia ED Course:  
Initial assessment performed. The patients presenting problems have been discussed, and they are in agreement with the care plan formulated and outlined with them. I have encouraged them to ask questions as they arise throughout their visit. PROGRESS NOTE: 
6:47 PM 
Pt's blood pressure is 138/100. Critical Care Time:  
0 Disposition: 
DISCHARGE NOTE 
7:45 PM 
The patient has been re-evaluated and is ready for discharge. Reviewed available results with patient.  Counseled patient on diagnosis and care plan. Patient has expressed understanding, and all questions have been answered. Patient agrees with plan and agrees to follow up as recommended, or return to the ED if their symptoms worsen. Discharge instructions have been provided and explained to the patient, along with reasons to return to the ED. PLAN: 
1. Discharge Current Discharge Medication List  
  
CONTINUE these medications which have CHANGED Details  
amLODIPine (NORVASC) 2.5 mg tablet Take 2 Tabs by mouth daily. Qty: 30 Tab, Refills: 0  
  
  
 
2. Follow-up Information Follow up With Specialties Details Why Contact Info Wing Arian MD Internal Medicine In 2 days  [id number] [address] [facility] [address] 
[phone] [facility] EMERGENCY DEPT Emergency Medicine  If symptoms worsen [address] 
[phone] Return to ED if worse Diagnosis Clinical Impression: 1. Labile hypertension 2. Left leg pain Attestations: This note is prepared by Issa Flaherty, acting as Scribe for Valentin Mast MD. 
 
Valentin Mast MD: The scribe's documentation has been prepared under my direction and personally reviewed by me in its entirety. I confirm that the note above accurately reflects all work, treatment, procedures, and medical decision making performed by me. This note will not be viewable in [address]

## 2018-12-11 NOTE — ED NOTES
MD Yoandy Munoz has done a bedside review of the discharge instructions. The patient is in understanding. The patients line(s) are removed. The patient is dressed, and belongings together for discharge.

## 2018-12-11 NOTE — ED NOTES
Bedside and Verbal shift change report given to Kyle Robbins RN (oncoming nurse) by Ana Cervantes RN (offgoing nurse). Report included the following information SBAR, ED Summary, MAR and Recent Results.

## 2018-12-11 NOTE — DISCHARGE INSTRUCTIONS
Leg Pain: Care Instructions  Your Care Instructions  Many things can cause leg pain. Too much exercise or overuse can cause a muscle cramp (or charley horse). You can get leg cramps from not eating a balanced diet that has enough potassium, calcium, and other minerals. If you do not drink enough fluids or are taking certain medicines, you may develop leg cramps. Other causes of leg pain include injuries, blood flow problems, nerve damage, and twisted and enlarged veins (varicose veins). You can usually ease pain with self-care. Your doctor may recommend that you rest your leg and keep it elevated. Follow-up care is a key part of your treatment and safety. Be sure to make and go to all appointments, and call your doctor if you are having problems. It's also a good idea to know your test results and keep a list of the medicines you take. How can you care for yourself at home? · Take pain medicines exactly as directed. ? If the doctor gave you a prescription medicine for pain, take it as prescribed. ? If you are not taking a prescription pain medicine, ask your doctor if you can take an over-the-counter medicine. · Take any other medicines exactly as prescribed. Call your doctor if you think you are having a problem with your medicine. · Rest your leg while you have pain, and avoid standing for long periods of time. · Prop up your leg at or above the level of your heart when possible. · Make sure you are eating a balanced diet that is rich in calcium, potassium, and magnesium, especially if you are pregnant. · If directed by your doctor, put ice or a cold pack on the area for 10 to 20 minutes at a time. Put a thin cloth between the ice and your skin. · Your leg may be in a splint, a brace, or an elastic bandage, and you may have crutches to help you walk. Follow your doctor's directions about how long to wear supports and how to use the crutches. When should you call for help?   Call 911 anytime you think you may need emergency care. For example, call if:    · You have sudden chest pain and shortness of breath, or you cough up blood.     · Your leg is cool or pale or changes color.    Call your doctor now or seek immediate medical care if:    · You have increasing or severe pain.     · Your leg suddenly feels weak and you cannot move it.     · You have signs of a blood clot, such as:  ? Pain in your calf, back of the knee, thigh, or groin. ? Redness and swelling in your leg or groin.     · You have signs of infection, such as:  ? Increased pain, swelling, warmth, or redness. ? Red streaks leading from the sore area. ? Pus draining from a place on your leg. ? A fever.     · You cannot bear weight on your leg.    Watch closely for changes in your health, and be sure to contact your doctor if:    · You do not get better as expected. Where can you learn more? Go to http://graeme-vlad.info/. Enter I916 in the search box to learn more about \"Leg Pain: Care Instructions. \"  Current as of: November 20, 2017  Content Version: 11.8  © 6259-6987 [employer]. Care instructions adapted under license by [employer] (which disclaims liability or warranty for this information). If you have questions about a medical condition or this instruction, always ask your healthcare professional. [place] [address] any warranty or liability for your use of this information. Acute High Blood Pressure: Care Instructions  Your Care Instructions    Acute high blood pressure is very high blood pressure. It's a serious problem. Very high blood pressure can damage your brain, heart, eyes, and kidneys. You may have been given medicines to lower your blood pressure. You may have gotten them as pills or through a needle in one of your veins. This is called an IV. And maybe you were given other medicines too.  These can be needed when high blood pressure causes other problems. To keep your blood pressure at a lower level, you may need to make healthy lifestyle changes. And you will probably need to take medicines. Be sure to follow up with your doctor about your blood pressure and what you can do about it. Follow-up care is a key part of your treatment and safety. Be sure to make and go to all appointments, and call your doctor if you are having problems. It's also a good idea to know your test results and keep a list of the medicines you take. How can you care for yourself at home? · See your doctor as often as he or she recommends. This is to make sure your blood pressure is under control. You will probably go at least 2 times a year. But it may be more often at first.  · Take your blood pressure medicine exactly as prescribed. You may take one or more types. They include diuretics, beta-blockers, ACE inhibitors, calcium channel blockers, and angiotensin II receptor blockers. Call your doctor if you think you are having a problem with your medicine. · If you take blood pressure medicine, talk to your doctor before you take decongestants or anti-inflammatory medicine, such as ibuprofen. These can raise blood pressure. · Learn how to check your blood pressure at home. Check it often. · Ask your doctor if it's okay to drink alcohol. · Talk to your doctor about lifestyle changes that can help blood pressure. These include being active and not smoking. When should you call for help? Call 911 anytime you think you may need emergency care. This may mean having symptoms that suggest that your blood pressure is causing a serious heart or blood vessel problem. Your blood pressure may be over 180/120.   For example, call 911 if:    · You have symptoms of a heart attack. These may include:  ? Chest pain or pressure, or a strange feeling in the chest.  ? Sweating. ? Shortness of breath. ? Nausea or vomiting.   ? Pain, pressure, or a strange feeling in the back, neck, jaw, or upper belly or in one or both shoulders or arms. ? Lightheadedness or sudden weakness. ? A fast or irregular heartbeat.     · You have symptoms of a stroke. These may include:  ? Sudden numbness, tingling, weakness, or loss of movement in your face, arm, or leg, especially on only one side of your body. ? Sudden vision changes. ? Sudden trouble speaking. ? Sudden confusion or trouble understanding simple statements. ? Sudden problems with walking or balance. ? A sudden, severe headache that is different from past headaches.     · You have severe back or belly pain.    Do not wait until your blood pressure comes down on its own. Get help right away.   Call your doctor now or seek immediate care if:    · Your blood pressure is much higher than normal (such as 180/120 or higher), but you don't have symptoms.     · You think high blood pressure is causing symptoms, such as:  ? Severe headache.  ? Blurry vision.    Watch closely for changes in your health, and be sure to contact your doctor if:    · Your blood pressure measures higher than your doctor recommends at least 2 times. That means the top number is higher or the bottom number is higher, or both.     · You think you may be having side effects from your blood pressure medicine. Where can you learn more? Go to http://graeme-vlad.info/. Enter A313 in the search box to learn more about \"Acute High Blood Pressure: Care Instructions. \"  Current as of: December 6, 2017  Content Version: 11.8  © 4666-6880 [employer]. Care instructions adapted under license by [employer] (which disclaims liability or warranty for this information). If you have questions about a medical condition or this instruction, always ask your healthcare professional. [place] [address] any warranty or liability for your use of this information.

## 2020-01-12 ENCOUNTER — HOSPITAL ENCOUNTER (EMERGENCY)
Age: 85
Discharge: HOME | End: 2020-01-12
Attending: EMERGENCY MEDICINE
Payer: MEDICARE

## 2020-01-12 ENCOUNTER — APPOINTMENT (OUTPATIENT)
Dept: GENERAL RADIOLOGY | Age: 85
End: 2020-01-12
Attending: EMERGENCY MEDICINE
Payer: MEDICARE

## 2020-01-12 VITALS
SYSTOLIC BLOOD PRESSURE: 124 MMHG | DIASTOLIC BLOOD PRESSURE: 76 MMHG | TEMPERATURE: 98 F | BODY MASS INDEX: 25.31 KG/M2 | RESPIRATION RATE: 18 BRPM | WEIGHT: 108.91 LBS | OXYGEN SATURATION: 97 % | HEART RATE: 72 BPM

## 2020-01-12 DIAGNOSIS — R05.9 COUGH: Primary | ICD-10-CM

## 2020-01-12 DIAGNOSIS — B34.9 VIRAL SYNDROME: ICD-10-CM

## 2020-01-12 LAB
FLUAV AG NPH QL IA: NEGATIVE
FLUBV AG NOSE QL IA: NEGATIVE

## 2020-01-12 PROCEDURE — 99284 EMERGENCY DEPT VISIT MOD MDM: CPT

## 2020-01-12 PROCEDURE — 87804 INFLUENZA ASSAY W/OPTIC: CPT

## 2020-01-12 PROCEDURE — 74011250637 HC RX REV CODE- 250/637: Performed by: EMERGENCY MEDICINE

## 2020-01-12 PROCEDURE — 71046 X-RAY EXAM CHEST 2 VIEWS: CPT

## 2020-01-12 RX ORDER — BENZONATATE 100 MG/1
100 CAPSULE ORAL ONCE
Status: COMPLETED | OUTPATIENT
Start: 2020-01-12 | End: 2020-01-12

## 2020-01-12 RX ORDER — BENZONATATE 100 MG/1
100 CAPSULE ORAL
Qty: 30 CAP | Refills: 0 | Status: SHIPPED | OUTPATIENT
Start: 2020-01-12 | End: 2020-01-19

## 2020-01-12 RX ORDER — ACETAMINOPHEN 500 MG
1000 TABLET ORAL
Status: COMPLETED | OUTPATIENT
Start: 2020-01-12 | End: 2020-01-12

## 2020-01-12 RX ADMIN — ACETAMINOPHEN 1000 MG: 500 TABLET ORAL at 21:05

## 2020-01-12 RX ADMIN — BENZONATATE 100 MG: 100 CAPSULE ORAL at 21:03

## 2020-01-13 NOTE — ED PROVIDER NOTES
EMERGENCY DEPARTMENT HISTORY AND PHYSICAL EXAM      Date: 1/12/2020  Patient Name: [place]    Please note that this dictation was completed with Vidya Shafer, the computer voice recognition software. Quite often unanticipated grammatical, syntax, homophones, and other interpretive errors are inadvertently transcribed by the computer software. Please disregard these errors. Please excuse any errors that have escaped final proofreading. History of Presenting Illness     Chief Complaint   Patient presents with    Cough     c/o cough since last night. History Provided By: Patient    HPI: [place], 80 y.o. female, presenting the emergency department complaining of cough. Cough is nonproductive. No fevers or chills. No muscle aches, no chest pain or shortness of breath. No unilateral leg pain or leg swelling. She is tried some Coricidin with minimal relief. No other exacerbating or relieving factors or associated symptoms at this time. PCP: Caleb Inman MD    No current facility-administered medications on file prior to encounter. Current Outpatient Medications on File Prior to Encounter   Medication Sig Dispense Refill    lisinopril (PRINIVIL, ZESTRIL) 20 mg tablet Take 40 mg by mouth daily.  lisinopril (PRINIVIL, ZESTRIL) 5 mg tablet Take 5 mg by mouth daily.  hydroCHLOROthiazide (HYDRODIURIL) 12.5 mg tablet Take 12.5 mg by mouth daily.  clopidogrel (PLAVIX) 75 mg tab Take 75 mg by mouth.  amLODIPine (NORVASC) 2.5 mg tablet Take 2 Tabs by mouth daily. 30 Tab 0    CRANBERRY FRUIT EXTRACT (CRANBERRY CONCENTRATE PO) Take  by mouth.  sucralfate (CARAFATE) 1 gram tablet Take 1 g by mouth four (4) times daily.  atorvastatin (LIPITOR) 20 mg tablet Take 1 Tab by mouth daily. (Patient taking differently: Take 40 mg by mouth daily.) 90 Tab 3    multivitamins-minerals-lutein (CENTRUM SILVER) Tab Take 1 Tab by mouth.         brimonidine (ALPHAGAN) 0.15 % ophthalmic solution Administer 1 Drop to both eyes three (3) times daily.  levothyroxine (SYNTHROID) 125 mcg tablet Take  by mouth daily (before breakfast).  LATANOPROST (XALATAN OP) Apply 1 Drop to eye daily. Past History     Past Medical History:  Past Medical History:   Diagnosis Date    Aortic root dilatation (HCC)     mild    Arthritis     hands    CAD (coronary artery disease) 2001    started seeing Cardiologist 10 years ago for low pulse    Dyslipidemia     GERD (gastroesophageal reflux disease)     does not take anything other than Tums    HTN (hypertension)     Hypothyroidism     Thyroid disease        Past Surgical History:  Past Surgical History:   Procedure Laterality Date    UPPER GI ENDOSCOPY,CTRL BLEED  2/17/2017         VASCULAR SURGERY PROCEDURE UNLIST  2015    aortic aneurysm repair       Family History:  No family history on file. Social History:  Social History     Tobacco Use    Smoking status: Never Smoker    Smokeless tobacco: Never Used   Substance Use Topics    Alcohol use: No    Drug use: No       Allergies: Allergies   Allergen Reactions    Antibiotic [Boevu-Kddja-Jnwahvs-Pramoxine] Other (comments)     Unknown antibiotic begins with a \"C\"         Review of Systems   Review of Systems   Constitutional: Negative for chills and fever. HENT: Negative for congestion and sore throat. Eyes: Negative for visual disturbance. Respiratory: Positive for cough. Negative for shortness of breath. Cardiovascular: Negative for chest pain and leg swelling. Gastrointestinal: Negative for abdominal pain, blood in stool, diarrhea and nausea. Endocrine: Negative for polyuria. Genitourinary: Negative for dysuria, flank pain, vaginal bleeding and vaginal discharge. Musculoskeletal: Negative for myalgias. Skin: Negative for rash. Allergic/Immunologic: Negative for immunocompromised state. Neurological: Negative for weakness and headaches. Psychiatric/Behavioral: Negative for confusion. Physical Exam   Physical Exam  Vitals signs and nursing note reviewed. Constitutional:       Appearance: She is well-developed. HENT:      Head: Normocephalic and atraumatic. Eyes:      General:         Right eye: No discharge. Left eye: No discharge. Conjunctiva/sclera: Conjunctivae normal.      Pupils: Pupils are equal, round, and reactive to light. Neck:      Musculoskeletal: Normal range of motion and neck supple. Trachea: No tracheal deviation. Cardiovascular:      Rate and Rhythm: Normal rate and regular rhythm. Heart sounds: Normal heart sounds. No murmur. Pulmonary:      Effort: Pulmonary effort is normal. No respiratory distress. Breath sounds: Normal breath sounds. No wheezing or rales. Abdominal:      General: Bowel sounds are normal.      Palpations: Abdomen is soft. Tenderness: There is no tenderness. There is no guarding or rebound. Musculoskeletal: Normal range of motion. General: No tenderness or deformity. Skin:     General: Skin is warm and dry. Findings: No erythema or rash. Neurological:      Mental Status: She is alert and oriented to person, place, and time. Psychiatric:         Behavior: Behavior normal.         Diagnostic Study Results     Labs -   No results found for this or any previous visit (from the past 12 hour(s)). Radiologic Studies -   XR CHEST PA LAT   Final Result   IMPRESSION: No acute process           CT Results  (Last 48 hours)    None        CXR Results  (Last 48 hours)               01/12/20 2037  XR CHEST PA LAT Final result    Impression:  IMPRESSION: No acute process           Narrative:  INDICATION:  Cough        COMPARISON: December 2018       FINDINGS: PA and lateral views of the chest demonstrate a stable   cardiomediastinal silhouette and clear lungs bilaterally. The bones are   osteopenic and there is exaggerated thoracic spine kyphosis. Medical Decision Making   I am the first provider for this patient. I reviewed the vital signs, available nursing notes, past medical history, past surgical history, family history and social history. Vital Signs-Reviewed the patient's vital signs. No data found. Records Reviewed: Nursing Notes and Old Medical Records    Provider Notes (Medical Decision Making):   Patient looks well, nontoxic, likely viral syndrome, will check chest x-ray and flu. Disposition likely to home pending imaging. Doubt ACS, doubt PE, patient again looks well and nontoxic. ED Course:   Initial assessment performed. The patients presenting problems have been discussed, and they are in agreement with the care plan formulated and outlined with them. I have encouraged them to ask questions as they arise throughout their visit. Critical Care Time:   none    Disposition:  DISCHARGE NOTE  Patients results have been reviewed with them. Patient and/or family have verbally conveyed their understanding and agreement of the patient's signs, symptoms, diagnosis, treatment and prognosis and additionally agree to follow up as recommended or return to the Emergency Room should their condition change or have any new concerns prior to their follow-up appointment. Patient verbally agrees with the care-plan and verbally conveys that all of their questions have been answered. Discharge instructions have also been provided to the patient with some educational information regarding their diagnosis as well a list of reasons why they would want to return to the ER prior to their follow-up appointment should their condition change. PLAN:  1. Discharge Medication List as of 1/12/2020 10:51 PM        2.    Follow-up Information     Follow up With Specialties Details Why Lucy Jacome MD Internal Medicine Schedule an appointment as soon as possible for a visit  [address] [facility] [place] [address]  [phone]      [facility] [place] [facility] Emergency Medicine  If symptoms worsen [address] [place] [address]  [phone]          Return to ED if worse     Diagnosis     Clinical Impression:   1. Cough    2. Viral syndrome        Attestations:   This note was completed by Génesis Myers DO

## 2020-01-13 NOTE — DISCHARGE INSTRUCTIONS
Patient Education        Cough: Care Instructions  Your Care Instructions    A cough is your body's response to something that bothers your throat or airways. Many things can cause a cough. You might cough because of a cold or the flu, bronchitis, or asthma. Smoking, postnasal drip, allergies, and stomach acid that backs up into your throat also can cause coughs. A cough is a symptom, not a disease. Most coughs stop when the cause, such as a cold, goes away. You can take a few steps at home to cough less and feel better. Follow-up care is a key part of your treatment and safety. Be sure to make and go to all appointments, and call your doctor if you are having problems. It's also a good idea to know your test results and keep a list of the medicines you take. How can you care for yourself at home? · Drink lots of water and other fluids. This helps thin the mucus and soothes a dry or sore throat. Honey or lemon juice in hot water or tea may ease a dry cough. · Take cough medicine as directed by your doctor. · Prop up your head on pillows to help you breathe and ease a dry cough. · Try cough drops to soothe a dry or sore throat. Cough drops don't stop a cough. Medicine-flavored cough drops are no better than candy-flavored drops or hard candy. · Do not smoke. Avoid secondhand smoke. If you need help quitting, talk to your doctor about stop-smoking programs and medicines. These can increase your chances of quitting for good. When should you call for help? Call 911 anytime you think you may need emergency care.  For example, call if:    · You have severe trouble breathing.    Call your doctor now or seek immediate medical care if:    · You cough up blood.     · You have new or worse trouble breathing.     · You have a new or higher fever.     · You have a new rash.    Watch closely for changes in your health, and be sure to contact your doctor if:    · You cough more deeply or more often, especially if you notice more mucus or a change in the color of your mucus.     · You have new symptoms, such as a sore throat, an earache, or sinus pain.     · You do not get better as expected. Where can you learn more? Go to http://graeme-vlad.info/. Enter D279 in the search box to learn more about \"Cough: Care Instructions. \"  Current as of: June 9, 2019  Content Version: 12.2  © 7524-2952 [employer]. Care instructions adapted under license by [employer] (which disclaims liability or warranty for this information). If you have questions about a medical condition or this instruction, always ask your healthcare professional. [address] any warranty or liability for your use of this information. Patient Education        Viral Infections: Care Instructions  Your Care Instructions    You don't feel well, but it's not clear what's causing it. You may have a viral infection. Viruses cause many illnesses, such as the common cold, influenza, fever, rashes, and the diarrhea, nausea, and vomiting that are often called \"stomach flu. \" You may wonder if antibiotic medicines could make you feel better. But antibiotics only treat infections caused by bacteria. They don't work on viruses. The good news is that viral infections usually aren't serious. Most will go away in a few days without medical treatment. In the meantime, there are a few things you can do to make yourself more comfortable. Follow-up care is a key part of your treatment and safety. Be sure to make and go to all appointments, and call your doctor if you are having problems. It's also a good idea to know your test results and keep a list of the medicines you take. How can you care for yourself at home? · Get plenty of rest if you feel tired. · Take an over-the-counter pain medicine if needed, such as acetaminophen (Tylenol), ibuprofen (Advil, Motrin), or naproxen (Aleve).  Read and follow all instructions on the label. · Be careful when taking over-the-counter cold or flu medicines and Tylenol at the same time. Many of these medicines have acetaminophen, which is Tylenol. Read the labels to make sure that you are not taking more than the recommended dose. Too much acetaminophen (Tylenol) can be harmful. · Drink plenty of fluids, enough so that your urine is light yellow or clear like water. If you have kidney, heart, or liver disease and have to limit fluids, talk with your doctor before you increase the amount of fluids you drink. · Stay home from work, school, and other public places while you have a fever. When should you call for help? Call 911 anytime you think you may need emergency care. For example, call if:    · You have severe trouble breathing.     · You passed out (lost consciousness).    Call your doctor now or seek immediate medical care if:    · You seem to be getting much sicker.     · You have a new or higher fever.     · You have blood in your stools.     · You have new belly pain, or your pain gets worse.     · You have a new rash.    Watch closely for changes in your health, and be sure to contact your doctor if:    · You start to get better and then get worse.     · You do not get better as expected. Where can you learn more? Go to http://graeme-vlad.info/. Enter C831 in the search box to learn more about \"Viral Infections: Care Instructions. \"  Current as of: June 9, 2019  Content Version: 12.2  © 3962-8100 [employer]. Care instructions adapted under license by [employer] (which disclaims liability or warranty for this information). If you have questions about a medical condition or this instruction, always ask your healthcare professional. [place] [address] any warranty or liability for your use of this information.

## 2021-03-22 ENCOUNTER — HOSPITAL ENCOUNTER (EMERGENCY)
Age: 86
Discharge: HOME | End: 2021-03-22
Attending: EMERGENCY MEDICINE
Payer: MEDICARE

## 2021-03-22 ENCOUNTER — APPOINTMENT (OUTPATIENT)
Dept: CT IMAGING | Age: 86
End: 2021-03-22
Attending: EMERGENCY MEDICINE
Payer: MEDICARE

## 2021-03-22 ENCOUNTER — APPOINTMENT (OUTPATIENT)
Dept: GENERAL RADIOLOGY | Age: 86
End: 2021-03-22
Attending: EMERGENCY MEDICINE
Payer: MEDICARE

## 2021-03-22 VITALS
HEIGHT: 59 IN | TEMPERATURE: 98 F | RESPIRATION RATE: 20 BRPM | HEART RATE: 62 BPM | SYSTOLIC BLOOD PRESSURE: 155 MMHG | DIASTOLIC BLOOD PRESSURE: 74 MMHG | OXYGEN SATURATION: 98 % | BODY MASS INDEX: 22.67 KG/M2 | WEIGHT: 112.43 LBS

## 2021-03-22 DIAGNOSIS — R06.02 SOB (SHORTNESS OF BREATH): Primary | ICD-10-CM

## 2021-03-22 DIAGNOSIS — F41.9 ANXIETY DISORDER, UNSPECIFIED TYPE: ICD-10-CM

## 2021-03-22 DIAGNOSIS — I10 ACCELERATED HYPERTENSION: ICD-10-CM

## 2021-03-22 DIAGNOSIS — I71.20 THORACIC AORTIC ANEURYSM WITHOUT RUPTURE: ICD-10-CM

## 2021-03-22 LAB
ALBUMIN SERPL-MCNC: 3.4 G/DL (ref 3.5–5)
ALBUMIN/GLOB SERPL: 0.9 {RATIO} (ref 1.1–2.2)
ALP SERPL-CCNC: 96 U/L (ref 45–117)
ALT SERPL-CCNC: 44 U/L (ref 12–78)
ANION GAP SERPL CALC-SCNC: 4 MMOL/L (ref 5–15)
AST SERPL-CCNC: 29 U/L (ref 15–37)
BASOPHILS # BLD: 0 K/UL (ref 0–0.1)
BASOPHILS NFR BLD: 0 % (ref 0–1)
BILIRUB SERPL-MCNC: 0.5 MG/DL (ref 0.2–1)
BNP SERPL-MCNC: 2353 PG/ML
BUN SERPL-MCNC: 10 MG/DL (ref 6–20)
BUN/CREAT SERPL: 11 (ref 12–20)
CALCIUM SERPL-MCNC: 8.7 MG/DL (ref 8.5–10.1)
CHLORIDE SERPL-SCNC: 106 MMOL/L (ref 97–108)
CO2 SERPL-SCNC: 28 MMOL/L (ref 21–32)
CREAT SERPL-MCNC: 0.88 MG/DL (ref 0.55–1.02)
DIFFERENTIAL METHOD BLD: NORMAL
EOSINOPHIL # BLD: 0.3 K/UL (ref 0–0.4)
EOSINOPHIL NFR BLD: 3 % (ref 0–7)
ERYTHROCYTE [DISTWIDTH] IN BLOOD BY AUTOMATED COUNT: 13.9 % (ref 11.5–14.5)
GLOBULIN SER CALC-MCNC: 3.6 G/DL (ref 2–4)
GLUCOSE SERPL-MCNC: 105 MG/DL (ref 65–100)
HCT VFR BLD AUTO: 43.1 % (ref 35–47)
HGB BLD-MCNC: 14.2 G/DL (ref 11.5–16)
IMM GRANULOCYTES # BLD AUTO: 0 K/UL (ref 0–0.04)
IMM GRANULOCYTES NFR BLD AUTO: 0 % (ref 0–0.5)
LYMPHOCYTES # BLD: 1.7 K/UL (ref 0.8–3.5)
LYMPHOCYTES NFR BLD: 23 % (ref 12–49)
MCH RBC QN AUTO: 28.9 PG (ref 26–34)
MCHC RBC AUTO-ENTMCNC: 32.9 G/DL (ref 30–36.5)
MCV RBC AUTO: 87.6 FL (ref 80–99)
MONOCYTES # BLD: 0.8 K/UL (ref 0–1)
MONOCYTES NFR BLD: 11 % (ref 5–13)
NEUTS SEG # BLD: 4.6 K/UL (ref 1.8–8)
NEUTS SEG NFR BLD: 63 % (ref 32–75)
NRBC # BLD: 0 K/UL (ref 0–0.01)
NRBC BLD-RTO: 0 PER 100 WBC
PLATELET # BLD AUTO: 150 K/UL (ref 150–400)
PMV BLD AUTO: 11 FL (ref 8.9–12.9)
POTASSIUM SERPL-SCNC: 4 MMOL/L (ref 3.5–5.1)
PROT SERPL-MCNC: 7 G/DL (ref 6.4–8.2)
RBC # BLD AUTO: 4.92 M/UL (ref 3.8–5.2)
SODIUM SERPL-SCNC: 138 MMOL/L (ref 136–145)
TROPONIN I SERPL-MCNC: <0.05 NG/ML
TROPONIN I SERPL-MCNC: <0.05 NG/ML
WBC # BLD AUTO: 7.5 K/UL (ref 3.6–11)

## 2021-03-22 PROCEDURE — 71275 CT ANGIOGRAPHY CHEST: CPT

## 2021-03-22 PROCEDURE — 83880 ASSAY OF NATRIURETIC PEPTIDE: CPT

## 2021-03-22 PROCEDURE — 71045 X-RAY EXAM CHEST 1 VIEW: CPT

## 2021-03-22 PROCEDURE — 36415 COLL VENOUS BLD VENIPUNCTURE: CPT

## 2021-03-22 PROCEDURE — 80053 COMPREHEN METABOLIC PANEL: CPT

## 2021-03-22 PROCEDURE — 99284 EMERGENCY DEPT VISIT MOD MDM: CPT

## 2021-03-22 PROCEDURE — 74011000636 HC RX REV CODE- 636: Performed by: EMERGENCY MEDICINE

## 2021-03-22 PROCEDURE — 84484 ASSAY OF TROPONIN QUANT: CPT

## 2021-03-22 PROCEDURE — 93005 ELECTROCARDIOGRAM TRACING: CPT

## 2021-03-22 PROCEDURE — 85025 COMPLETE CBC W/AUTO DIFF WBC: CPT

## 2021-03-22 RX ORDER — HYDROXYZINE 25 MG/1
25 TABLET, FILM COATED ORAL
Qty: 20 TAB | Refills: 0 | Status: SHIPPED | OUTPATIENT
Start: 2021-03-22 | End: 2021-04-01

## 2021-03-22 RX ADMIN — IOPAMIDOL 80 ML: 755 INJECTION, SOLUTION INTRAVENOUS at 17:44

## 2021-03-22 NOTE — DISCHARGE INSTRUCTIONS
Thank you for allowing us to take care of you today! In this packet, I have included a copy of all your lab results and/or radiologic studies so you can have them easily available at your follow-up visit. We hope we addressed all of your concerns and needs. We strive to provide excellent quality care in the Emergency Department. You will receive a survey after your visit to evaluate the care you were provided. It was a pleasure serving you. Should you receive a survey from us, we invite you to share your experience with us. The exam and treatment you received in the Emergency Department were for an urgent problem and are not intended as complete care. It is important that you follow up with a doctor, nurse practitioner, or physician assistant for ongoing care. If your symptoms become worse or you do not improve as expected and you are unable to reach your usual health care provider, you should return to the Emergency Department. We are available 24 hours a day. Please take your discharge instructions with you when you go to your follow-up appointment. If you have any problem arranging a follow-up appointment, contact the Emergency Department immediately. If a prescription has been provided, please have it filled as soon as possible to prevent a delay in treatment. Read the entire medication instruction sheet provided to you by the pharmacy. If you have any questions or reservations about taking the medication due to side effects or interactions with other medications, please call your primary care physician or contact the ER to speak with the charge nurse. Make an appointment with your family doctor or the physician you were referred to for follow-up of this visit as instructed on your discharge paperwork. Return to the ER if you are unable to be seen or if you are unable to be seen in a timely manner.     If you have any problem arranging the follow-up visit, contact the Emergency Department immediately. I hope you feel better and thank you again for allowing us to provide you with excellent care today at [place] [address].!       Warmest regards,    Raquel Juarez MD  Emergency Medicine Physician  [place] [address].      ______________________________________________________________________________________________    Vitals:    03/22/21 1356   BP: (!) 155/74   BP 1 Location: Left arm   BP Patient Position: Sitting   Pulse: 62   Resp: 20   Temp: 98 °F (36.7 °C)   SpO2: 98%   Weight: 51 kg (112 lb 7 oz)   Height: 4' 11\" (1.499 m)       Recent Results (from the past 12 hour(s))   EKG, 12 LEAD, INITIAL    Collection Time: 03/22/21  2:04 PM   Result Value Ref Range    Ventricular Rate 66 BPM    Atrial Rate 66 BPM    P-R Interval 224 ms    QRS Duration 86 ms    Q-T Interval 428 ms    QTC Calculation (Bezet) 448 ms    Calculated P Axis 32 degrees    Calculated R Axis -52 degrees    Calculated T Axis 69 degrees    Diagnosis       Sinus rhythm with 1st degree AV block with occasional premature ventricular   complexes  Left axis deviation  Voltage criteria for left ventricular hypertrophy  Inferior infarct , age undetermined  Anterior infarct , age undetermined  When compared with ECG of 06-JAN-2018 17:10,  Anterior infarct is now present  T wave inversion now evident in Anterior leads     CBC WITH AUTOMATED DIFF    Collection Time: 03/22/21  2:07 PM   Result Value Ref Range    WBC 7.5 3.6 - 11.0 K/uL    RBC 4.92 3.80 - 5.20 M/uL    HGB 14.2 11.5 - 16.0 g/dL    HCT 43.1 35.0 - 47.0 %    MCV 87.6 80.0 - 99.0 FL    MCH 28.9 26.0 - 34.0 PG    MCHC 32.9 30.0 - 36.5 g/dL    RDW 13.9 11.5 - 14.5 %    PLATELET 769 113 - 236 K/uL    MPV 11.0 8.9 - 12.9 FL    NRBC 0.0 0  WBC    ABSOLUTE NRBC 0.00 0.00 - 0.01 K/uL    NEUTROPHILS 63 32 - 75 %    LYMPHOCYTES 23 12 - 49 %    MONOCYTES 11 5 - 13 %    EOSINOPHILS 3 0 - 7 %    BASOPHILS 0 0 - 1 %    IMMATURE GRANULOCYTES 0 0.0 - 0.5 %    ABS. NEUTROPHILS 4.6 1.8 - 8.0 K/UL    ABS. LYMPHOCYTES 1.7 0.8 - 3.5 K/UL    ABS. MONOCYTES 0.8 0.0 - 1.0 K/UL    ABS. EOSINOPHILS 0.3 0.0 - 0.4 K/UL    ABS. BASOPHILS 0.0 0.0 - 0.1 K/UL    ABS. IMM. GRANS. 0.0 0.00 - 0.04 K/UL    DF AUTOMATED     METABOLIC PANEL, COMPREHENSIVE    Collection Time: 03/22/21  2:07 PM   Result Value Ref Range    Sodium 138 136 - 145 mmol/L    Potassium 4.0 3.5 - 5.1 mmol/L    Chloride 106 97 - 108 mmol/L    CO2 28 21 - 32 mmol/L    Anion gap 4 (L) 5 - 15 mmol/L    Glucose 105 (H) 65 - 100 mg/dL    BUN 10 6 - 20 MG/DL    Creatinine 0.88 0.55 - 1.02 MG/DL    BUN/Creatinine ratio 11 (L) 12 - 20      GFR est AA >60 >60 ml/min/1.73m2    GFR est non-AA >60 >60 ml/min/1.73m2    Calcium 8.7 8.5 - 10.1 MG/DL    Bilirubin, total 0.5 0.2 - 1.0 MG/DL    ALT (SGPT) 44 12 - 78 U/L    AST (SGOT) 29 15 - 37 U/L    Alk. phosphatase 96 45 - 117 U/L    Protein, total 7.0 6.4 - 8.2 g/dL    Albumin 3.4 (L) 3.5 - 5.0 g/dL    Globulin 3.6 2.0 - 4.0 g/dL    A-G Ratio 0.9 (L) 1.1 - 2.2     TROPONIN I    Collection Time: 03/22/21  2:07 PM   Result Value Ref Range    Troponin-I, Qt. <0.05 <0.05 ng/mL   TROPONIN I    Collection Time: 03/22/21  5:58 PM   Result Value Ref Range    Troponin-I, Qt. <0.05 <0.05 ng/mL   NT-PRO BNP    Collection Time: 03/22/21  5:58 PM   Result Value Ref Range    NT pro-BNP 2,353 (H) <450 PG/ML       CTA CHEST W OR W WO CONT   Final Result   1. No pulmonary embolism. 2. 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 3. No pneumonia or pulmonary edema. XR CHEST PORT   Final Result      No acute process on portable chest. No change. CT Results  (Last 48 hours)                 03/22/21 1424  CTA CHEST W OR W WO CONT Final result    Impression:  1. No pulmonary embolism. 2. 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    Likely chronic, uncertain clinical significance. 3. No pneumonia or pulmonary edema. Narrative:  EXAM:  CTA CHEST W OR W WO CONT       INDICATION: Chest pain, shortness of breath, previous AAA repair. COMPARISON: No comparison CT chest       TECHNIQUE: Helical thin section chest CT following uneventful intravenous   administration of nonionic contrast 80 mL of isovue 370 according to   departmental PE protocol. Coronal and sagittal reformats were performed. 3D post   processing was performed. CT dose reduction was achieved through the use of a   standardized protocol tailored for this examination and automatic exposure   control for dose modulation. FINDINGS: This is a excellent quality study for the evaluation of pulmonary   embolism to the first subsegmental arterial level. There is no pulmonary   embolism to this level. THYROID: Atrophy versus absent. No nodule. MEDIASTINUM: No mass or lymphadenopathy. EVA: No mass or lymphadenopathy. THORACIC AORTA: Atherosclerosis. Aortic arch measures 3.2 cm in diameter. Distal   descending thoracic aorta measures 3.2 cm in diameter. No evaluation for   dissection given the timing of the contrast bolus. HEART: Cardiomegaly. No effusion. Coronary artery calcification atherosclerosis   is at least moderate. ESOPHAGUS: No wall thickening or dilatation. TRACHEA/BRONCHI: Patent. PLEURA: No effusion or pneumothorax. LUNGS: No lung mass or pneumonia. No pulmonary edema or unexpected groundglass   opacity. Bilateral lower lobe subsegmental atelectasis versus scarring. UPPER ABDOMEN: Partially imaged. No acute pathology. BONES: No aggressive bone lesion or fracture.

## 2021-03-22 NOTE — ED NOTES
[facility] Pt provided with discharge instructions Rx and instructions on follow up care. Pt out of ED ambulatory accompanied by self.

## 2021-03-22 NOTE — ED TRIAGE NOTES
Pt sent from [facility] for c/o shortness of breath. Denies cough, chest pain, fever and shortness of breath. Started 4-5 days ago.

## 2021-03-23 LAB
ATRIAL RATE: 66 BPM
CALCULATED P AXIS, ECG09: 32 DEGREES
CALCULATED R AXIS, ECG10: -52 DEGREES
CALCULATED T AXIS, ECG11: 69 DEGREES
DIAGNOSIS, 93000: NORMAL
P-R INTERVAL, ECG05: 224 MS
Q-T INTERVAL, ECG07: 428 MS
QRS DURATION, ECG06: 86 MS
QTC CALCULATION (BEZET), ECG08: 448 MS
VENTRICULAR RATE, ECG03: 66 BPM

## 2021-03-23 NOTE — ED PROVIDER NOTES
EMERGENCY DEPARTMENT HISTORY AND PHYSICAL EXAM      Please note that this dictation was completed with the assistance of \"Dragon\", the computer voice recognition software. Quite often unanticipated grammatical, syntax, homophones, and other interpretive errors are inadvertently transcribed by the computer software. Please disregard these errors and any errors that have escaped final proofreading. Thank you. Patient Name: Merissa Reaves  : 1934  MRN: [id number]  History of Presenting Illness     Chief Complaint   Patient presents with    Shortness of Breath     Pt sent from [facility] for c/o shortness of breath. Denies cough, chest pain, fever and shortness of breath. Started 4-5 days ago. History Provided By: Patient    HPI: Merissa Reaves, 80 y.o. female with past medical history as documented below presents to the ED with c/o of 4-5 days of SOB. Pt reports SOB has been intermittent and can occur at rest or with exertion. Pt notes hx of anxiety and believes that is the cause of her current sx's. Reports remote hx of thoracic aneurysm repair. Denies chest pain, fever, cough. Reports having both COVID-19 vaccinations. Pt denies any other alleviating or exacerbating factors. Additionally, pt specifically denies any recent fever, chills, headache, nausea, vomiting, abdominal pain, CP, lightheadedness, dizziness, numbness, weakness, lower extremity swelling, heart palpitations, urinary sxs, diarrhea, constipation, melena, hematochezia, cough, or congestion. There are no other complaints, changes or physical findings pertinent to the HPI at this time.     PCP: Alicia Villavicencio MD    Past History   Past Medical History:  Past Medical History:   Diagnosis Date    Aortic root dilatation ([address]     mild    Arthritis     hands    CAD (coronary artery disease)     started seeing Cardiologist 10 years ago for low pulse    Dyslipidemia     GERD (gastroesophageal reflux disease)     does not take anything other than Tums    HTN (hypertension)     Hypothyroidism     Thyroid disease        Past Surgical History:  Past Surgical History:   Procedure Laterality Date    UPPER GI ENDOSCOPY,CTRL BLEED  2/17/2017         VASCULAR SURGERY PROCEDURE UNLIST  2015    aortic aneurysm repair       Family History:  No family history on file. Social History:  Social History     Tobacco Use    Smoking status: Never Smoker    Smokeless tobacco: Never Used   Substance Use Topics    Alcohol use: No    Drug use: No       Allergies: Allergies   Allergen Reactions    Antibiotic [Zfdsl-Jxcto-Mdnyifw-Pramoxine] Other (comments)     Unknown antibiotic begins with a \"C\"       Current Medications:  No current facility-administered medications on file prior to encounter. Current Outpatient Medications on File Prior to Encounter   Medication Sig Dispense Refill    lisinopril (PRINIVIL, ZESTRIL) 20 mg tablet Take 40 mg by mouth daily.  lisinopril (PRINIVIL, ZESTRIL) 5 mg tablet Take 5 mg by mouth daily.  hydroCHLOROthiazide (HYDRODIURIL) 12.5 mg tablet Take 12.5 mg by mouth daily.  clopidogrel (PLAVIX) 75 mg tab Take 75 mg by mouth.  amLODIPine (NORVASC) 2.5 mg tablet Take 2 Tabs by mouth daily. 30 Tab 0    CRANBERRY FRUIT EXTRACT (CRANBERRY CONCENTRATE PO) Take  by mouth.  sucralfate (CARAFATE) 1 gram tablet Take 1 g by mouth four (4) times daily.  atorvastatin (LIPITOR) 20 mg tablet Take 1 Tab by mouth daily. (Patient taking differently: Take 40 mg by mouth daily.) 90 Tab 3    multivitamins-minerals-lutein (CENTRUM SILVER) Tab Take 1 Tab by mouth.  brimonidine (ALPHAGAN) 0.15 % ophthalmic solution Administer 1 Drop to both eyes three (3) times daily.  levothyroxine (SYNTHROID) 125 mcg tablet Take  by mouth daily (before breakfast).  LATANOPROST (XALATAN OP) Apply 1 Drop to eye daily. Review of Systems   Review of Systems   Constitutional: Negative.   Negative for chills and fever. HENT: Negative. Negative for congestion and sore throat. Eyes: Negative. Respiratory: Positive for shortness of breath. Negative for cough, chest tightness and wheezing. Cardiovascular: Negative. Negative for chest pain, palpitations and leg swelling. Gastrointestinal: Negative. Negative for abdominal distention, abdominal pain, blood in stool, constipation, diarrhea, nausea and vomiting. Endocrine: Negative. Genitourinary: Negative. Negative for difficulty urinating, dysuria, flank pain, frequency, hematuria and urgency. Musculoskeletal: Negative. Negative for back pain and neck stiffness. Skin: Negative. Negative for rash. Allergic/Immunologic: Negative. Neurological: Negative. Negative for dizziness, syncope, weakness, light-headedness, numbness and headaches. Hematological: Negative. Psychiatric/Behavioral: Negative for confusion and self-injury. The patient is nervous/anxious. Physical Exam   Physical Exam  Vitals signs and nursing note reviewed. Constitutional:       General: She is not in acute distress. Appearance: She is well-developed. She is not diaphoretic. HENT:      Head: Normocephalic and atraumatic. Mouth/Throat:      Pharynx: No oropharyngeal exudate. Eyes:      Conjunctiva/sclera: Conjunctivae normal.   Neck:      Musculoskeletal: Normal range of motion. Cardiovascular:      Rate and Rhythm: Normal rate and regular rhythm. Heart sounds: Normal heart sounds. Pulmonary:      Effort: Pulmonary effort is normal. No respiratory distress. Breath sounds: Normal breath sounds. No wheezing or rales. Chest:      Chest wall: No tenderness. Abdominal:      General: Bowel sounds are normal. There is no distension. Palpations: Abdomen is soft. There is no mass. Tenderness: There is no abdominal tenderness. There is no guarding or rebound. Musculoskeletal: Normal range of motion.    Skin:     General: Skin is warm. Neurological:      Mental Status: She is alert and oriented to person, place, and time. Cranial Nerves: No cranial nerve deficit. Motor: No abnormal muscle tone. Diagnostic Study Results     Labs -   I have personally reviewed and interpreted all available laboratory results. Recent Results (from the past 24 hour(s))   EKG, 12 LEAD, INITIAL    Collection Time: 03/22/21  2:04 PM   Result Value Ref Range    Ventricular Rate 66 BPM    Atrial Rate 66 BPM    P-R Interval 224 ms    QRS Duration 86 ms    Q-T Interval 428 ms    QTC Calculation (Bezet) 448 ms    Calculated P Axis 32 degrees    Calculated R Axis -52 degrees    Calculated T Axis 69 degrees    Diagnosis       Sinus rhythm with 1st degree AV block with occasional premature ventricular   complexes  Left axis deviation  Voltage criteria for left ventricular hypertrophy  Inferior infarct , age undetermined  Anterior infarct , age undetermined  When compared with ECG of 06-JAN-2018 17:10,  Anterior infarct is now present  T wave inversion now evident in Anterior leads     CBC WITH AUTOMATED DIFF    Collection Time: 03/22/21  2:07 PM   Result Value Ref Range    WBC 7.5 3.6 - 11.0 K/uL    RBC 4.92 3.80 - 5.20 M/uL    HGB 14.2 11.5 - 16.0 g/dL    HCT 43.1 35.0 - 47.0 %    MCV 87.6 80.0 - 99.0 FL    MCH 28.9 26.0 - 34.0 PG    MCHC 32.9 30.0 - 36.5 g/dL    RDW 13.9 11.5 - 14.5 %    PLATELET 843 603 - 012 K/uL    MPV 11.0 8.9 - 12.9 FL    NRBC 0.0 0  WBC    ABSOLUTE NRBC 0.00 0.00 - 0.01 K/uL    NEUTROPHILS 63 32 - 75 %    LYMPHOCYTES 23 12 - 49 %    MONOCYTES 11 5 - 13 %    EOSINOPHILS 3 0 - 7 %    BASOPHILS 0 0 - 1 %    IMMATURE GRANULOCYTES 0 0.0 - 0.5 %    ABS. NEUTROPHILS 4.6 1.8 - 8.0 K/UL    ABS. LYMPHOCYTES 1.7 0.8 - 3.5 K/UL    ABS. MONOCYTES 0.8 0.0 - 1.0 K/UL    ABS. EOSINOPHILS 0.3 0.0 - 0.4 K/UL    ABS. BASOPHILS 0.0 0.0 - 0.1 K/UL    ABS. IMM.  GRANS. 0.0 0.00 - 0.04 K/UL    DF AUTOMATED     METABOLIC PANEL, COMPREHENSIVE Collection Time: 03/22/21  2:07 PM   Result Value Ref Range    Sodium 138 136 - 145 mmol/L    Potassium 4.0 3.5 - 5.1 mmol/L    Chloride 106 97 - 108 mmol/L    CO2 28 21 - 32 mmol/L    Anion gap 4 (L) 5 - 15 mmol/L    Glucose 105 (H) 65 - 100 mg/dL    BUN 10 6 - 20 MG/DL    Creatinine 0.88 0.55 - 1.02 MG/DL    BUN/Creatinine ratio 11 (L) 12 - 20      GFR est AA >60 >60 ml/min/1.73m2    GFR est non-AA >60 >60 ml/min/1.73m2    Calcium 8.7 8.5 - 10.1 MG/DL    Bilirubin, total 0.5 0.2 - 1.0 MG/DL    ALT (SGPT) 44 12 - 78 U/L    AST (SGOT) 29 15 - 37 U/L    Alk. phosphatase 96 45 - 117 U/L    Protein, total 7.0 6.4 - 8.2 g/dL    Albumin 3.4 (L) 3.5 - 5.0 g/dL    Globulin 3.6 2.0 - 4.0 g/dL    A-G Ratio 0.9 (L) 1.1 - 2.2     TROPONIN I    Collection Time: 03/22/21  2:07 PM   Result Value Ref Range    Troponin-I, Qt. <0.05 <0.05 ng/mL   TROPONIN I    Collection Time: 03/22/21  5:58 PM   Result Value Ref Range    Troponin-I, Qt. <0.05 <0.05 ng/mL   NT-PRO BNP    Collection Time: 03/22/21  5:58 PM   Result Value Ref Range    NT pro-BNP 2,353 (H) <450 PG/ML       Radiologic Studies -   I have personally reviewed and interpreted all available imaging studies and agree with radiology interpretation and report. CTA CHEST W OR W WO CONT   Final Result   1. No pulmonary embolism. 2. 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 3. No pneumonia or pulmonary edema. XR CHEST PORT   Final Result      No acute process on portable chest. No change. CT Results  (Last 48 hours)               03/22/21 1744  CTA CHEST W OR W WO CONT Final result    Impression:  1. No pulmonary embolism. 2. 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 3. No pneumonia or pulmonary edema.            Narrative:  EXAM:  CTA CHEST W OR W WO CONT       INDICATION: Chest pain, shortness of breath, previous AAA repair. COMPARISON: No comparison CT chest       TECHNIQUE: Helical thin section chest CT following uneventful intravenous   administration of nonionic contrast 80 mL of isovue 370 according to   departmental PE protocol. Coronal and sagittal reformats were performed. 3D post   processing was performed. CT dose reduction was achieved through the use of a   standardized protocol tailored for this examination and automatic exposure   control for dose modulation. FINDINGS: This is a excellent quality study for the evaluation of pulmonary   embolism to the first subsegmental arterial level. There is no pulmonary   embolism to this level. THYROID: Atrophy versus absent. No nodule. MEDIASTINUM: No mass or lymphadenopathy. EVA: No mass or lymphadenopathy. THORACIC AORTA: Atherosclerosis. Aortic arch measures 3.2 cm in diameter. Distal   descending thoracic aorta measures 3.2 cm in diameter. No evaluation for   dissection given the timing of the contrast bolus. HEART: Cardiomegaly. No effusion. Coronary artery calcification atherosclerosis   is at least moderate. ESOPHAGUS: No wall thickening or dilatation. TRACHEA/BRONCHI: Patent. PLEURA: No effusion or pneumothorax. LUNGS: No lung mass or pneumonia. No pulmonary edema or unexpected groundglass   opacity. Bilateral lower lobe subsegmental atelectasis versus scarring. UPPER ABDOMEN: Partially imaged. No acute pathology. BONES: No aggressive bone lesion or fracture. CXR Results  (Last 48 hours)               03/22/21 1505  XR CHEST PORT Final result    Impression:      No acute process on portable chest. No change. Narrative:  EXAM: XR CHEST PORT       INDICATION: Shortness of breath. Hypertension and coronary artery disease. COMPARISON: Chest views on 1/12/2020 12/10/2018. TECHNIQUE: Upright portable chest AP view       FINDINGS: Mild cardiomegaly is unchanged. Aorta is atherosclerotic and tortuous. The pulmonary vasculature is within normal limits. The lungs and pleural spaces are clear. Bones are osteopenic. Bilateral shoulder   arthritis is partially imaged. Upper abdomen is within normal limits. Medical Decision Making   I reviewed the vital signs, available nursing notes, past medical history, past surgical history, family history and social history. Vital Signs-Reviewed the patient's vital signs. Patient Vitals for the past 24 hrs:   Temp Pulse Resp BP SpO2   03/22/21 1356 98 °F (36.7 °C) 62 20 (!) 155/74 98 %       Pulse Oximetry Analysis - 98% on RA    Cardiac Monitor:   Rate: 62 bpm  The cardiac monitor revealed the following rhythm as interpreted by me: Normal Sinus Rhythm     The cardiac monitor was ordered secondary to the patient's history of SOB and to monitor the patient for dysrhythmia    ED EKG interpretation:  Rhythm: normal sinus rhythm and PVC's; and regular . Rate (approx.): 66; Axis: left axis deviation; P wave: normal; QRS interval: normal ; ST/T wave: normal; Other findings: normal. This EKG was interpreted by Leoncio Nicole MD    Records Reviewed: Nursing Notes, Old Medical Records, Previous electrocardiograms, Previous Radiology Studies and Previous Laboratory Studies    Provider Notes (Medical Decision Making):   Patient presents with acute dyspnea. DDx: asthma, copd, pna, pulmonary edema, acute bronchitis, ACS, ptx, pna. Will obtain EKG, labs, CXR, provide O2 as needed for hypoxia, treat symptomatically and reassess. Will continue to monitor closely in ED. ED Course:   I am the first provider for this patient's ED visit today. Initial assessment performed. I discussed presenting problems, concerns and my formulated plan for today's visit with the patient and any available family members at bedside. I encouraged them to ask questions as they arise throughout the visit.      I reviewed our electronic medical record system for any past medical records that were available that may contribute to the patient's current condition, the nursing notes and vital signs from today's visit. ED Orders Placed :  Orders Placed This Encounter    XR CHEST PORT    CTA CHEST W OR W WO CONT    CBC WITH AUTOMATED DIFF    METABOLIC PANEL, COMPREHENSIVE    TROPONIN I    TROPONIN I    NT-PRO BNP    EKG, 12 LEAD, INITIAL    iopamidoL (ISOVUE-370) 76 % injection 100 mL    hydrOXYzine HCL (ATARAX) 25 mg tablet       ED Medications Administered:  Medications   iopamidoL (ISOVUE-370) 76 % injection 100 mL (80 mL IntraVENous Given 3/22/21 8274)        Progress Note:  Patient has been reassessed and reports feeling better and symptoms have improved significantly after ED treatment. [address] final labs and imaging have been reviewed with her and available family and/or caregiver. They have been counseled regarding her diagnosis. She verbally conveys understanding and agreement of the signs, symptoms, diagnosis, treatment and prognosis and additionally agrees to follow up as recommended with Dr. Adonay Jeffers MD and/or specialist in 24 - 48 hours. She also agrees with the care-plan we created together and conveys that all of her questions have been answered. I have also put together a packet of discharge instructions for her that include: 1) educational information regarding their diagnosis, 2) how to care for their diagnosis at home, as well a 3) list of reasons why they would want to return to the ED prior to their follow-up appointment should the patient's condition change or symptoms worsen. I have answered all questions to the patient's satisfaction. Strict return precautions given. She both understood and agreed with plan as discussed. Vital signs stable for discharge. Disposition:   DISCHARGE  The pt is ready for discharge.  The pt's signs, symptoms, diagnosis, and discharge instructions have been discussed and pt has conveyed their understanding. The pt is to follow up as recommended or return to ER should their symptoms worsen. Plan has been discussed and pt is in agreement.    Plan:  1. Return precautions as discussed with patient and available family and/or caregiver.     2.   Discharge Medication List as of 3/22/2021  6:51 PM      No current facility-administered medications for this encounter.     Current Outpatient Medications:   •  hydrOXYzine HCL (ATARAX) 25 mg tablet, Take 1 Tab by mouth every six (6) hours as needed for Anxiety for up to 10 days., Disp: 20 Tab, Rfl: 0  •  lisinopril (PRINIVIL, ZESTRIL) 20 mg tablet, Take 40 mg by mouth daily., Disp: , Rfl:   •  lisinopril (PRINIVIL, ZESTRIL) 5 mg tablet, Take 5 mg by mouth daily., Disp: , Rfl:   •  hydroCHLOROthiazide (HYDRODIURIL) 12.5 mg tablet, Take 12.5 mg by mouth daily., Disp: , Rfl:   •  clopidogrel (PLAVIX) 75 mg tab, Take 75 mg by mouth., Disp: , Rfl:   •  amLODIPine (NORVASC) 2.5 mg tablet, Take 2 Tabs by mouth daily., Disp: 30 Tab, Rfl: 0  •  CRANBERRY FRUIT EXTRACT (CRANBERRY CONCENTRATE PO), Take  by mouth., Disp: , Rfl:   •  sucralfate (CARAFATE) 1 gram tablet, Take 1 g by mouth four (4) times daily., Disp: , Rfl:   •  atorvastatin (LIPITOR) 20 mg tablet, Take 1 Tab by mouth daily. (Patient taking differently: Take 40 mg by mouth daily.), Disp: 90 Tab, Rfl: 3  •  multivitamins-minerals-lutein (CENTRUM SILVER) Tab, Take 1 Tab by mouth.  , Disp: , Rfl:   •  brimonidine (ALPHAGAN) 0.15 % ophthalmic solution, Administer 1 Drop to both eyes three (3) times daily., Disp: , Rfl:   •  levothyroxine (SYNTHROID) 125 mcg tablet, Take  by mouth daily (before breakfast)., Disp: , Rfl:   •  LATANOPROST (XALATAN OP), Apply 1 Drop to eye daily., Disp: , Rfl:     3.   Follow-up Information     Follow up With Specialties Details Why Contact Info    [facility] EMERGENCY DEPT Emergency Medicine  As needed, If symptoms worsen [address]  [place]  [address]  [phone]          Instructed to return to ED if worse  Diagnosis   Clinical Impression:  1. SOB (shortness of breath)    2. Anxiety disorder, unspecified type    3. Accelerated hypertension    4. Thoracic aortic aneurysm without rupture (HCC)        Attestation:  Aspen Pan MD, am the attending of record for this patient. I personally performed the services described in this documentation on this date, 3/22/2021 for patient, [place]. I have reviewed the chart and verified that the record is accurate and complete.

## 2021-10-05 ENCOUNTER — TRANSCRIBE ORDER (OUTPATIENT)
Dept: REGISTRATION | Age: 86
End: 2021-10-05

## 2021-10-05 ENCOUNTER — HOSPITAL ENCOUNTER (OUTPATIENT)
Dept: GENERAL RADIOLOGY | Age: 86
Discharge: HOME | End: 2021-10-05
Payer: MEDICARE

## 2021-10-05 DIAGNOSIS — M79.671 RIGHT FOOT PAIN: Primary | ICD-10-CM

## 2021-10-05 DIAGNOSIS — M79.671 RIGHT FOOT PAIN: ICD-10-CM

## 2021-10-05 PROCEDURE — 73630 X-RAY EXAM OF FOOT: CPT

## 2022-03-18 PROBLEM — K92.2 UPPER GI BLEEDING: Status: ACTIVE | Noted: 2017-02-17

## 2022-06-08 ENCOUNTER — APPOINTMENT (OUTPATIENT)
Dept: CT IMAGING | Age: 87
End: 2022-06-08
Attending: EMERGENCY MEDICINE
Payer: MEDICARE

## 2022-06-08 ENCOUNTER — HOSPITAL ENCOUNTER (EMERGENCY)
Age: 87
Discharge: HOME | End: 2022-06-08
Attending: EMERGENCY MEDICINE
Payer: MEDICARE

## 2022-06-08 ENCOUNTER — APPOINTMENT (OUTPATIENT)
Dept: GENERAL RADIOLOGY | Age: 87
End: 2022-06-08
Attending: EMERGENCY MEDICINE
Payer: MEDICARE

## 2022-06-08 VITALS
WEIGHT: 118.17 LBS | RESPIRATION RATE: 20 BRPM | BODY MASS INDEX: 23.82 KG/M2 | OXYGEN SATURATION: 98 % | SYSTOLIC BLOOD PRESSURE: 166 MMHG | HEIGHT: 59 IN | HEART RATE: 89 BPM | DIASTOLIC BLOOD PRESSURE: 105 MMHG | TEMPERATURE: 98.1 F

## 2022-06-08 DIAGNOSIS — I49.8 WANDERING ATRIAL PACEMAKER: ICD-10-CM

## 2022-06-08 DIAGNOSIS — I71.40 ABDOMINAL ANEURYSM: ICD-10-CM

## 2022-06-08 DIAGNOSIS — R10.13 ABDOMINAL PAIN, EPIGASTRIC: Primary | ICD-10-CM

## 2022-06-08 DIAGNOSIS — F41.1 ANXIETY STATE: ICD-10-CM

## 2022-06-08 LAB
ALBUMIN SERPL-MCNC: 3.9 G/DL (ref 3.5–5)
ALBUMIN/GLOB SERPL: 1.1 {RATIO} (ref 1.1–2.2)
ALP SERPL-CCNC: 87 U/L (ref 45–117)
ALT SERPL-CCNC: 27 U/L (ref 12–78)
ANION GAP SERPL CALC-SCNC: 6 MMOL/L (ref 5–15)
AST SERPL-CCNC: 23 U/L (ref 15–37)
BASOPHILS # BLD: 0 K/UL (ref 0–0.1)
BASOPHILS NFR BLD: 0 % (ref 0–1)
BILIRUB SERPL-MCNC: 0.7 MG/DL (ref 0.2–1)
BUN SERPL-MCNC: 7 MG/DL (ref 6–20)
BUN/CREAT SERPL: 10 (ref 12–20)
CALCIUM SERPL-MCNC: 9.4 MG/DL (ref 8.5–10.1)
CHLORIDE SERPL-SCNC: 102 MMOL/L (ref 97–108)
CO2 SERPL-SCNC: 29 MMOL/L (ref 21–32)
CREAT SERPL-MCNC: 0.7 MG/DL (ref 0.55–1.02)
DIFFERENTIAL METHOD BLD: ABNORMAL
EOSINOPHIL # BLD: 0.1 K/UL (ref 0–0.4)
EOSINOPHIL NFR BLD: 1 % (ref 0–7)
ERYTHROCYTE [DISTWIDTH] IN BLOOD BY AUTOMATED COUNT: 12.9 % (ref 11.5–14.5)
GLOBULIN SER CALC-MCNC: 3.6 G/DL (ref 2–4)
GLUCOSE SERPL-MCNC: 162 MG/DL (ref 65–100)
HCT VFR BLD AUTO: 45.1 % (ref 35–47)
HGB BLD-MCNC: 15.7 G/DL (ref 11.5–16)
IMM GRANULOCYTES # BLD AUTO: 0.1 K/UL (ref 0–0.04)
IMM GRANULOCYTES NFR BLD AUTO: 1 % (ref 0–0.5)
LYMPHOCYTES # BLD: 1.5 K/UL (ref 0.8–3.5)
LYMPHOCYTES NFR BLD: 20 % (ref 12–49)
MAGNESIUM SERPL-MCNC: 2 MG/DL (ref 1.6–2.4)
MCH RBC QN AUTO: 29.1 PG (ref 26–34)
MCHC RBC AUTO-ENTMCNC: 34.8 G/DL (ref 30–36.5)
MCV RBC AUTO: 83.5 FL (ref 80–99)
MONOCYTES # BLD: 0.8 K/UL (ref 0–1)
MONOCYTES NFR BLD: 11 % (ref 5–13)
NEUTS SEG # BLD: 5.1 K/UL (ref 1.8–8)
NEUTS SEG NFR BLD: 67 % (ref 32–75)
NRBC # BLD: 0 K/UL (ref 0–0.01)
NRBC BLD-RTO: 0 PER 100 WBC
PLATELET # BLD AUTO: 200 K/UL (ref 150–400)
PMV BLD AUTO: 9.7 FL (ref 8.9–12.9)
POTASSIUM SERPL-SCNC: 3.4 MMOL/L (ref 3.5–5.1)
PROT SERPL-MCNC: 7.5 G/DL (ref 6.4–8.2)
RBC # BLD AUTO: 5.4 M/UL (ref 3.8–5.2)
SODIUM SERPL-SCNC: 137 MMOL/L (ref 136–145)
TROPONIN-HIGH SENSITIVITY: 19 NG/L (ref 0–51)
TROPONIN-HIGH SENSITIVITY: 21 NG/L (ref 0–51)
WBC # BLD AUTO: 7.5 K/UL (ref 3.6–11)

## 2022-06-08 PROCEDURE — 84484 ASSAY OF TROPONIN QUANT: CPT

## 2022-06-08 PROCEDURE — 83735 ASSAY OF MAGNESIUM: CPT

## 2022-06-08 PROCEDURE — 74011000636 HC RX REV CODE- 636: Performed by: EMERGENCY MEDICINE

## 2022-06-08 PROCEDURE — 74177 CT ABD & PELVIS W/CONTRAST: CPT

## 2022-06-08 PROCEDURE — 85025 COMPLETE CBC W/AUTO DIFF WBC: CPT

## 2022-06-08 PROCEDURE — 96374 THER/PROPH/DIAG INJ IV PUSH: CPT

## 2022-06-08 PROCEDURE — 36415 COLL VENOUS BLD VENIPUNCTURE: CPT

## 2022-06-08 PROCEDURE — 74011000250 HC RX REV CODE- 250: Performed by: EMERGENCY MEDICINE

## 2022-06-08 PROCEDURE — 74011250637 HC RX REV CODE- 250/637: Performed by: EMERGENCY MEDICINE

## 2022-06-08 PROCEDURE — 99285 EMERGENCY DEPT VISIT HI MDM: CPT

## 2022-06-08 PROCEDURE — 71045 X-RAY EXAM CHEST 1 VIEW: CPT

## 2022-06-08 PROCEDURE — 80053 COMPREHEN METABOLIC PANEL: CPT

## 2022-06-08 PROCEDURE — 93005 ELECTROCARDIOGRAM TRACING: CPT

## 2022-06-08 PROCEDURE — 74011250636 HC RX REV CODE- 250/636: Performed by: EMERGENCY MEDICINE

## 2022-06-08 RX ORDER — LORAZEPAM 2 MG/ML
0.25 INJECTION INTRAMUSCULAR
Status: COMPLETED | OUTPATIENT
Start: 2022-06-08 | End: 2022-06-08

## 2022-06-08 RX ADMIN — POTASSIUM BICARBONATE 40 MEQ: 782 TABLET, EFFERVESCENT ORAL at 21:40

## 2022-06-08 RX ADMIN — LORAZEPAM 0.26 MG: 2 INJECTION INTRAMUSCULAR; INTRAVENOUS at 21:36

## 2022-06-08 RX ADMIN — ALUMINUM HYDROXIDE AND MAGNESIUM HYDROXIDE 40 ML: 200; 200 SUSPENSION ORAL at 21:38

## 2022-06-08 RX ADMIN — IOPAMIDOL 100 ML: 755 INJECTION, SOLUTION INTRAVENOUS at 22:15

## 2022-06-09 LAB
ATRIAL RATE: 95 BPM
CALCULATED P AXIS, ECG09: 23 DEGREES
CALCULATED R AXIS, ECG10: -57 DEGREES
CALCULATED T AXIS, ECG11: 81 DEGREES
DIAGNOSIS, 93000: NORMAL
P-R INTERVAL, ECG05: 248 MS
Q-T INTERVAL, ECG07: 388 MS
QRS DURATION, ECG06: 92 MS
QTC CALCULATION (BEZET), ECG08: 487 MS
VENTRICULAR RATE, ECG03: 95 BPM

## 2022-06-09 NOTE — DISCHARGE INSTRUCTIONS
Please follow-up with your heart doctor for your heart monitor, your heart rhythm today looked to be wandering atrial pacemaker which resolved. Your CAT scan did show an incidental aneurism, follow-up with the vascular doctors.  Return for new or worsening symptoms at any time

## 2022-06-09 NOTE — ED PROVIDER NOTES
EMERGENCY DEPARTMENT HISTORY AND PHYSICAL EXAM      Date: 6/8/2022  Patient Name: Rigo Coyne    History of Presenting Illness     Chief Complaint   Patient presents with    Irregular Heart Beat     new onset afib       History Provided By: Patient, EMS and Caregiver    HPI: Rigo Coyne, 80 y.o. female presents to the ED with cc of anxiety. 29-year-old female presents emergency department with a chief complaint of anxiety. Patient reports that she was seen at outside ED, diagnosed with COVID. She has been vaccinated. Reports since that diagnosis she has been doing well, this was approximately 6 days ago. Reports she has had a cough productive of a small amount of yellow sputum. Patient does report some associated epigastric pain. Describes a \"burning. \"  Improved with nothing and worse with nothing. No nausea or vomiting. Has had some diarrhea. Denies chest pain or shortness of breath. For EMS, patient was in an irregular heart rhythm. Patient denies a history of atrial fibrillation. Patient called EMS today because she felt \"very nervous and anxious. \"    There are no other complaints, changes, or physical findings at this time. PCP: Griselda Turcios MD    No current facility-administered medications on file prior to encounter. Current Outpatient Medications on File Prior to Encounter   Medication Sig Dispense Refill    lisinopril (PRINIVIL, ZESTRIL) 20 mg tablet Take 40 mg by mouth daily.  lisinopril (PRINIVIL, ZESTRIL) 5 mg tablet Take 5 mg by mouth daily.  hydroCHLOROthiazide (HYDRODIURIL) 12.5 mg tablet Take 12.5 mg by mouth daily.  clopidogrel (PLAVIX) 75 mg tab Take 75 mg by mouth.  amLODIPine (NORVASC) 2.5 mg tablet Take 2 Tabs by mouth daily. 30 Tab 0    CRANBERRY FRUIT EXTRACT (CRANBERRY CONCENTRATE PO) Take  by mouth.  sucralfate (CARAFATE) 1 gram tablet Take 1 g by mouth four (4) times daily.       atorvastatin (LIPITOR) 20 mg tablet Take 1 Tab by mouth daily. (Patient taking differently: Take 40 mg by mouth daily.) 90 Tab 3    multivitamins-minerals-lutein (CENTRUM SILVER) Tab Take 1 Tab by mouth.  brimonidine (ALPHAGAN) 0.15 % ophthalmic solution Administer 1 Drop to both eyes three (3) times daily.  levothyroxine (SYNTHROID) 125 mcg tablet Take  by mouth daily (before breakfast).  LATANOPROST (XALATAN OP) Apply 1 Drop to eye daily. Past History     Past Medical History:  Past Medical History:   Diagnosis Date    Aortic root dilatation (HCC)     mild    Arthritis     hands    CAD (coronary artery disease) 2001    started seeing Cardiologist 10 years ago for low pulse    Dyslipidemia     GERD (gastroesophageal reflux disease)     does not take anything other than Tums    HTN (hypertension)     Hypothyroidism     Thyroid disease        Past Surgical History:  Past Surgical History:   Procedure Laterality Date    UPPER GI ENDOSCOPY,CTRL BLEED  2/17/2017         VASCULAR SURGERY PROCEDURE UNLIST  2015    aortic aneurysm repair       Family History:  History reviewed. No pertinent family history. Social History:  Social History     Tobacco Use    Smoking status: Never Smoker    Smokeless tobacco: Never Used   Substance Use Topics    Alcohol use: No    Drug use: No       Allergies: Allergies   Allergen Reactions    Antibiotic [Emjej-Adfju-Bvxhekl-Pramoxine] Other (comments)     Unknown antibiotic begins with a \"C\"         Review of Systems   Review of Systems   Constitutional: Negative for activity change, chills and fever. HENT: Negative for facial swelling and voice change. Eyes: Negative for redness. Respiratory: Positive for cough. Negative for shortness of breath and wheezing. Cardiovascular: Negative for chest pain and leg swelling. Gastrointestinal: Positive for abdominal pain. Negative for diarrhea, nausea and vomiting.    Genitourinary: Negative for decreased urine volume, difficulty urinating and dysuria. Musculoskeletal: Negative for gait problem. Skin: Negative for pallor and rash. Neurological: Negative for tremors and facial asymmetry. Psychiatric/Behavioral: Negative for agitation. The patient is nervous/anxious. All other systems reviewed and are negative. Physical Exam   Physical Exam  Vitals and nursing note reviewed. Constitutional:       Comments: 51-year-old female, resting in bed, no acute distress   HENT:      Head: Normocephalic and atraumatic. Cardiovascular:      Rate and Rhythm: Normal rate. Rhythm irregular. Heart sounds: No murmur heard. No friction rub. No gallop. Comments: Cardiac monitor is interpreted by me shows an irregular rhythm with different P waves. Patient monitor given report of atrial fibrillation. Pulmonary:      Effort: Pulmonary effort is normal.      Breath sounds: Normal breath sounds. Comments: Not hypoxic on room air  Abdominal:      Palpations: Abdomen is soft. Tenderness: There is no abdominal tenderness. Musculoskeletal:         General: No swelling. Normal range of motion. Cervical back: Normal range of motion. Skin:     General: Skin is warm. Capillary Refill: Capillary refill takes less than 2 seconds. Neurological:      General: No focal deficit present. Mental Status: She is alert.    Psychiatric:         Mood and Affect: Mood normal.         Diagnostic Study Results     Labs -     Recent Results (from the past 12 hour(s))   TROPONIN-HIGH SENSITIVITY    Collection Time: 06/08/22  8:16 PM   Result Value Ref Range    Troponin-High Sensitivity 19 0 - 51 ng/L   METABOLIC PANEL, COMPREHENSIVE    Collection Time: 06/08/22  8:16 PM   Result Value Ref Range    Sodium 137 136 - 145 mmol/L    Potassium 3.4 (L) 3.5 - 5.1 mmol/L    Chloride 102 97 - 108 mmol/L    CO2 29 21 - 32 mmol/L    Anion gap 6 5 - 15 mmol/L    Glucose 162 (H) 65 - 100 mg/dL    BUN 7 6 - 20 MG/DL    Creatinine 0.70 0.55 - 1.02 MG/DL BUN/Creatinine ratio 10 (L) 12 - 20      GFR est AA >60 >60 ml/min/1.73m2    GFR est non-AA >60 >60 ml/min/1.73m2    Calcium 9.4 8.5 - 10.1 MG/DL    Bilirubin, total 0.7 0.2 - 1.0 MG/DL    ALT (SGPT) 27 12 - 78 U/L    AST (SGOT) 23 15 - 37 U/L    Alk. phosphatase 87 45 - 117 U/L    Protein, total 7.5 6.4 - 8.2 g/dL    Albumin 3.9 3.5 - 5.0 g/dL    Globulin 3.6 2.0 - 4.0 g/dL    A-G Ratio 1.1 1.1 - 2.2     CBC WITH AUTOMATED DIFF    Collection Time: 06/08/22  8:16 PM   Result Value Ref Range    WBC 7.5 3.6 - 11.0 K/uL    RBC 5.40 (H) 3.80 - 5.20 M/uL    HGB 15.7 11.5 - 16.0 g/dL    HCT 45.1 35.0 - 47.0 %    MCV 83.5 80.0 - 99.0 FL    MCH 29.1 26.0 - 34.0 PG    MCHC 34.8 30.0 - 36.5 g/dL    RDW 12.9 11.5 - 14.5 %    PLATELET 626 392 - 717 K/uL    MPV 9.7 8.9 - 12.9 FL    NRBC 0.0 0  WBC    ABSOLUTE NRBC 0.00 0.00 - 0.01 K/uL    NEUTROPHILS 67 32 - 75 %    LYMPHOCYTES 20 12 - 49 %    MONOCYTES 11 5 - 13 %    EOSINOPHILS 1 0 - 7 %    BASOPHILS 0 0 - 1 %    IMMATURE GRANULOCYTES 1 (H) 0.0 - 0.5 %    ABS. NEUTROPHILS 5.1 1.8 - 8.0 K/UL    ABS. LYMPHOCYTES 1.5 0.8 - 3.5 K/UL    ABS. MONOCYTES 0.8 0.0 - 1.0 K/UL    ABS. EOSINOPHILS 0.1 0.0 - 0.4 K/UL    ABS. BASOPHILS 0.0 0.0 - 0.1 K/UL    ABS. IMM.  GRANS. 0.1 (H) 0.00 - 0.04 K/UL    DF AUTOMATED     MAGNESIUM    Collection Time: 06/08/22  8:16 PM   Result Value Ref Range    Magnesium 2.0 1.6 - 2.4 mg/dL   EKG, 12 LEAD, INITIAL    Collection Time: 06/08/22  8:26 PM   Result Value Ref Range    Ventricular Rate 95 BPM    Atrial Rate 95 BPM    P-R Interval 248 ms    QRS Duration 92 ms    Q-T Interval 388 ms    QTC Calculation (Bezet) 487 ms    Calculated P Axis 23 degrees    Calculated R Axis -57 degrees    Calculated T Axis 81 degrees    Diagnosis       Sinus rhythm with 1st degree AV block with premature atrial complexes with   aberrant conduction  Left axis deviation  Moderate voltage criteria for LVH, may be normal variant  Inferior infarct (cited on or before 22-MAR-2021)  Anterior infarct (cited on or before 22-MAR-2021)  When compared with ECG of 22-MAR-2021 14:04,  premature ventricular complexes are no longer present  aberrant conduction is now present  T wave inversion no longer evident in Anterior leads     TROPONIN-HIGH SENSITIVITY    Collection Time: 06/08/22  9:42 PM   Result Value Ref Range    Troponin-High Sensitivity 21 0 - 51 ng/L       Radiologic Studies -   CT ABD PELV W CONT   Final Result   1. Saccular aneurysm to the right side of the distal descending thoracic aorta   which measures approximately 2.5 x 1.6 x 2.8 cm. There appears to be a minimal   amount of intravenous contrast filling this aneurysm. This does not appear   significant changed allowing for differences in technique. 2. Suprarenal abdominal aortic aneurysm measuring 2.5 x 2.9 cm.   3. Incidental findings as above. XR CHEST PORT   Final Result   No acute findings. CT Results  (Last 48 hours)               06/08/22 2215  CT ABD PELV W CONT Final result    Impression:  1. Saccular aneurysm to the right side of the distal descending thoracic aorta   which measures approximately 2.5 x 1.6 x 2.8 cm. There appears to be a minimal   amount of intravenous contrast filling this aneurysm. This does not appear   significant changed allowing for differences in technique. 2. Suprarenal abdominal aortic aneurysm measuring 2.5 x 2.9 cm.   3. Incidental findings as above. Narrative:  EXAM:  CT ABDOMEN PELVIS WITH CONTRAST   INDICATION:  epigastric pain. Additional history:   COMPARISON: CT of the chest, 3/22/2021. .   TECHNIQUE:    Multislice helical CT was performed from the diaphragm to the symphysis pubis   with oral and intravenous contrast administration. Contiguous 5 mm axial images   were reconstructed and lung and soft tissue windows were generated. Coronal and   sagittal reformations were generated.     CT dose reduction was achieved through use of a standardized protocol tailored   for this examination and automatic exposure control for dose modulation. Selina Brittle FINDINGS:   INCIDENTALLY IMAGED CHEST:   Heart/vessels: Possible ectasia or aneurysmal dilation of the ascending aorta   Small, saccular aneurysm projecting off the right side of the descending,   thoracic aorta which measures approximately 2.5 x 1.6 x 2.8 cm 3: Likely not   significant changed allowing for technique/positioning. There is wispy increased   attenuation within this aneurysm suggesting contrast material.    Lungs/Pleura: Respiratory motion with bibasilar scarring/atelectasis. .   ABDOMEN:   Liver: Bajwa, diminished attenuation throughout the liver suggesting hepatic   steatosis. Gallbladder/Biliary: Within normal limits. Spleen: Within normal limits. Pancreas: Within normal limits. Adrenals: Within normal limits. Kidneys: Within normal limits. Peritoneum/Mesenteries: Within normal limits. Extraperitoneum: Within normal limits. Gastrointestinal tract: Within normal limits. Normal-appearing appendix   Vascular: Aneurysmal dilation of the suprarenal abdominal aorta which measures   2.5 x 2.9 cm, likely not significantly changed Calcifications in the aorta and   superior mesenteric artery. Prominent left gonadal vein with a phlebolith. Stent   within the right common iliac artery. Selina Brittle PELVIS:   Extraperitoneum: Within normal limits. Ureters: Within normal limits. Bladder: Within normal limits. Reproductive System: Prominent gonadal veins adjacent to the uterus which can be   seen with pelvic congestion. .   MSK:    Degenerative changes throughout the spine. Tiny, fat-containing umbilical   hernia. .           CXR Results  (Last 48 hours)               06/08/22 2029  XR CHEST PORT Final result    Impression:  No acute findings. Narrative:  EXAM: XR CHEST PORT       INDICATION: atrial fibrillation       COMPARISON: CT 3/22/2021 and chest x-ray 3/22/2021. FINDINGS: A portable AP radiograph of the chest was obtained at 20:21 hours. The   patient is on a cardiac monitor. The lungs are clear. The cardiac and   mediastinal contours and pulmonary vascularity are normal.  Atherosclerotic   calcifications affect the aortic arch and the thoracic aorta is again seen to be   aneurysmal and tortuous. Cortex MSK as well as diffuse osteopenia. Medical Decision Making   I am the first provider for this patient. I reviewed the vital signs, available nursing notes, past medical history, past surgical history, family history and social history. Vital Signs-Reviewed the patient's vital signs. Patient Vitals for the past 12 hrs:   Temp Pulse Resp BP SpO2   06/08/22 2014 -- -- -- -- 98 %   06/08/22 2009 98.1 °F (36.7 °C) 89 20 (!) 166/105 99 %     Records Reviewed: Nursing Notes and Old Medical Records    Provider Notes (Medical Decision Making):     22-year-old female presents emergency department chief complaint of anxiety. Patient reports 2 to 3 days of increasing anxiety, she is prescribed alprazolam but did not take this. Patient was transported by EMS who reported she was in atrial fibrillation on the monitor. Patient denies any history of this. Of note per record review, patient diagnosed with COVID 6 days ago. I suspect her cough, diarrhea and headache are likely sequelae to this. She appears well. Chest x-ray without pneumonia. She is not hypoxic. Regarding patient's irregular heart rate, her initial EKG was difficult to interpret as did show P waves but I interpreted as atrial fibrillation, upon review of the monitor at this appears to be more likely wandering atrial pacemaker. She is rate controlled, denies palpitations, shortness of breath or feeling lightheaded. This is likely an asymptomatic finding. I do not think she requires anticoagulation. Troponin without significant change and no dynamic changes on EKG.   Advise follow-up with cardiology for Holter monitor. Family states that this is in progress. Regarding patient's atypical epigastric pain. CT reviewed without acute pathology. Pain improved after GI cocktail. She does have incidental aneurysm, no significant change. Advised vascular follow-up. Patient given small dose of Ativan for acute anxiety she was tremulous on my initial assessment. She did report provement after this. ED Course:   Initial assessment performed. The patients presenting problems have been discussed, and they are in agreement with the care plan formulated and outlined with them. I have encouraged them to ask questions as they arise throughout their visit. ED Course as of 06/08/22 2322 Wed Jun 08, 2022 2035 Preliminary EKG interpreted by me. Shows atrial fibrillation with a HR of 95. No ST elevations or depressions concerning for ischemia. Question flutter waves throughout [MB]   2229 Labs reviewed, CBC with no leukocytosis. CMP with mild hypokalemia, we will replete. CXR reviewed, no infiltrate, I have a low suspicion for patient's tortuous aorta to be contributing to symptoms. Troponin without acute changes. [MB]   4877 Reassessed patient, reports she feels better. Cardiac monitory as interpreted by me shows SR with a HR of 79. Patient monitored given dysrhythmia. [MB]      ED Course User Index  [MB] MD Lolly El MD      Disposition:    Discharged    DISCHARGE PLAN:  1. Current Discharge Medication List        2.    Follow-up Information     Follow up With Specialties Details Why Contact Info    Wesley French MD Internal Medicine Physician In 3 days  [facility]  [address] [facility] [phone]      [facility] EMERGENCY DEPT Emergency Medicine  If symptoms worsen [address] [place] [address]  [phone]    Hammad Altamirano, [address] [facility] Vascular Surgery, Interventional Cardiology Physician, Cardiovascular Disease Physician In 1 week for heart rate monitor [address] [facility]  [address] [facility]      Beto Lopez MD General and Vascular Surgery In 1 week vascular doctor for aneurism [address] [facility]  [phone]          3. Return to ED if worse     Diagnosis     Clinical Impression:   1. Abdominal pain, epigastric    2. Wandering atrial pacemaker    3. Anxiety state    4. Abdominal aneurysm [facility])        Attestations:    Lory Rogers MD    Please note that this dictation was completed with [employer], the computer voice recognition software. Quite often unanticipated grammatical, syntax, homophones, and other interpretive errors are inadvertently transcribed by the computer software. Please disregard these errors. Please excuse any errors that have escaped final proofreading. Thank you.

## 2023-02-27 PROCEDURE — 99285 EMERGENCY DEPT VISIT HI MDM: CPT

## 2023-02-28 ENCOUNTER — HOSPITAL ENCOUNTER (EMERGENCY)
Age: 88
Discharge: HOME | End: 2023-02-28
Attending: STUDENT IN AN ORGANIZED HEALTH CARE EDUCATION/TRAINING PROGRAM
Payer: MEDICARE

## 2023-02-28 ENCOUNTER — APPOINTMENT (OUTPATIENT)
Dept: GENERAL RADIOLOGY | Age: 88
End: 2023-02-28
Attending: EMERGENCY MEDICINE
Payer: MEDICARE

## 2023-02-28 VITALS
WEIGHT: 120 LBS | RESPIRATION RATE: 16 BRPM | OXYGEN SATURATION: 98 % | SYSTOLIC BLOOD PRESSURE: 171 MMHG | HEART RATE: 77 BPM | BODY MASS INDEX: 24.24 KG/M2 | TEMPERATURE: 98.2 F | DIASTOLIC BLOOD PRESSURE: 82 MMHG

## 2023-02-28 DIAGNOSIS — N30.90 CYSTITIS: ICD-10-CM

## 2023-02-28 DIAGNOSIS — B02.9 HERPES ZOSTER WITHOUT COMPLICATION: Primary | ICD-10-CM

## 2023-02-28 LAB
ALBUMIN SERPL-MCNC: 3.7 G/DL (ref 3.5–5)
ALBUMIN/GLOB SERPL: 0.9 (ref 1.1–2.2)
ALP SERPL-CCNC: 90 U/L (ref 45–117)
ALT SERPL-CCNC: 32 U/L (ref 12–78)
AMORPH CRY URNS QL MICRO: ABNORMAL
ANION GAP SERPL CALC-SCNC: 5 MMOL/L (ref 5–15)
APPEARANCE UR: ABNORMAL
AST SERPL-CCNC: 41 U/L (ref 15–37)
ATRIAL RATE: 69 BPM
BACTERIA URNS QL MICRO: ABNORMAL /HPF
BASOPHILS # BLD: 0.1 K/UL (ref 0–0.1)
BASOPHILS NFR BLD: 1 % (ref 0–1)
BILIRUB SERPL-MCNC: 0.7 MG/DL (ref 0.2–1)
BILIRUB UR QL: NEGATIVE
BUN SERPL-MCNC: 11 MG/DL (ref 6–20)
BUN/CREAT SERPL: 14 (ref 12–20)
CALCIUM SERPL-MCNC: 9.4 MG/DL (ref 8.5–10.1)
CALCULATED P AXIS, ECG09: 11 DEGREES
CALCULATED R AXIS, ECG10: -59 DEGREES
CALCULATED T AXIS, ECG11: 54 DEGREES
CHLORIDE SERPL-SCNC: 102 MMOL/L (ref 97–108)
CO2 SERPL-SCNC: 28 MMOL/L (ref 21–32)
COLOR UR: ABNORMAL
CREAT SERPL-MCNC: 0.78 MG/DL (ref 0.55–1.02)
DIAGNOSIS, 93000: NORMAL
DIFFERENTIAL METHOD BLD: ABNORMAL
EOSINOPHIL # BLD: 0.3 K/UL (ref 0–0.4)
EOSINOPHIL NFR BLD: 3 % (ref 0–7)
EPITH CASTS URNS QL MICRO: ABNORMAL /LPF
ERYTHROCYTE [DISTWIDTH] IN BLOOD BY AUTOMATED COUNT: 13.5 % (ref 11.5–14.5)
GLOBULIN SER CALC-MCNC: 4 G/DL (ref 2–4)
GLUCOSE SERPL-MCNC: 160 MG/DL (ref 65–100)
GLUCOSE UR STRIP.AUTO-MCNC: NEGATIVE MG/DL
HCT VFR BLD AUTO: 47 % (ref 35–47)
HGB BLD-MCNC: 15.7 G/DL (ref 11.5–16)
HGB UR QL STRIP: ABNORMAL
IMM GRANULOCYTES # BLD AUTO: 0 K/UL (ref 0–0.04)
IMM GRANULOCYTES NFR BLD AUTO: 0 % (ref 0–0.5)
KETONES UR QL STRIP.AUTO: ABNORMAL MG/DL
LEUKOCYTE ESTERASE UR QL STRIP.AUTO: ABNORMAL
LYMPHOCYTES # BLD: 2.9 K/UL (ref 0.8–3.5)
LYMPHOCYTES NFR BLD: 30 % (ref 12–49)
MCH RBC QN AUTO: 29 PG (ref 26–34)
MCHC RBC AUTO-ENTMCNC: 33.4 G/DL (ref 30–36.5)
MCV RBC AUTO: 86.7 FL (ref 80–99)
MONOCYTES # BLD: 0.9 K/UL (ref 0–1)
MONOCYTES NFR BLD: 10 % (ref 5–13)
MUCOUS THREADS URNS QL MICRO: ABNORMAL /LPF
NEUTS SEG # BLD: 5.3 K/UL (ref 1.8–8)
NEUTS SEG NFR BLD: 56 % (ref 32–75)
NITRITE UR QL STRIP.AUTO: NEGATIVE
NRBC # BLD: 0 K/UL (ref 0–0.01)
NRBC BLD-RTO: 0 PER 100 WBC
P-R INTERVAL, ECG05: 218 MS
PH UR STRIP: 8 (ref 5–8)
PLATELET # BLD AUTO: 187 K/UL (ref 150–400)
PMV BLD AUTO: 10.2 FL (ref 8.9–12.9)
POTASSIUM SERPL-SCNC: 4 MMOL/L (ref 3.5–5.1)
PROT SERPL-MCNC: 7.7 G/DL (ref 6.4–8.2)
PROT UR STRIP-MCNC: ABNORMAL MG/DL
Q-T INTERVAL, ECG07: 410 MS
QRS DURATION, ECG06: 84 MS
QTC CALCULATION (BEZET), ECG08: 439 MS
RBC # BLD AUTO: 5.42 M/UL (ref 3.8–5.2)
RBC #/AREA URNS HPF: ABNORMAL /HPF (ref 0–5)
SODIUM SERPL-SCNC: 135 MMOL/L (ref 136–145)
SP GR UR REFRACTOMETRY: 1.02
TROPONIN I SERPL HS-MCNC: 11 NG/L (ref 0–51)
UA: UC IF INDICATED,UAUC: ABNORMAL
UROBILINOGEN UR QL STRIP.AUTO: 1 EU/DL (ref 0.2–1)
VENTRICULAR RATE, ECG03: 69 BPM
WBC # BLD AUTO: 9.5 K/UL (ref 3.6–11)
WBC URNS QL MICRO: ABNORMAL /HPF (ref 0–4)

## 2023-02-28 PROCEDURE — 84484 ASSAY OF TROPONIN QUANT: CPT

## 2023-02-28 PROCEDURE — 80053 COMPREHEN METABOLIC PANEL: CPT

## 2023-02-28 PROCEDURE — 74011250636 HC RX REV CODE- 250/636: Performed by: STUDENT IN AN ORGANIZED HEALTH CARE EDUCATION/TRAINING PROGRAM

## 2023-02-28 PROCEDURE — 93005 ELECTROCARDIOGRAM TRACING: CPT

## 2023-02-28 PROCEDURE — 71046 X-RAY EXAM CHEST 2 VIEWS: CPT

## 2023-02-28 PROCEDURE — 74011000250 HC RX REV CODE- 250: Performed by: EMERGENCY MEDICINE

## 2023-02-28 PROCEDURE — 81001 URINALYSIS AUTO W/SCOPE: CPT

## 2023-02-28 PROCEDURE — 74011250637 HC RX REV CODE- 250/637: Performed by: STUDENT IN AN ORGANIZED HEALTH CARE EDUCATION/TRAINING PROGRAM

## 2023-02-28 PROCEDURE — 85025 COMPLETE CBC W/AUTO DIFF WBC: CPT

## 2023-02-28 PROCEDURE — 96374 THER/PROPH/DIAG INJ IV PUSH: CPT

## 2023-02-28 PROCEDURE — 36415 COLL VENOUS BLD VENIPUNCTURE: CPT

## 2023-02-28 RX ORDER — VALACYCLOVIR HYDROCHLORIDE 1 G/1
1000 TABLET, FILM COATED ORAL 3 TIMES DAILY
Qty: 21 TABLET | Refills: 0 | Status: SHIPPED | OUTPATIENT
Start: 2023-02-28 | End: 2023-03-07

## 2023-02-28 RX ORDER — OXYCODONE HYDROCHLORIDE 5 MG/1
5 TABLET ORAL
Status: COMPLETED | OUTPATIENT
Start: 2023-02-28 | End: 2023-02-28

## 2023-02-28 RX ORDER — HYDROCODONE BITARTRATE AND ACETAMINOPHEN 5; 325 MG/1; MG/1
1 TABLET ORAL ONCE
Status: DISCONTINUED | OUTPATIENT
Start: 2023-02-28 | End: 2023-02-28

## 2023-02-28 RX ORDER — CEFDINIR 300 MG/1
300 CAPSULE ORAL 2 TIMES DAILY
Qty: 10 CAPSULE | Refills: 0 | Status: SHIPPED | OUTPATIENT
Start: 2023-02-28

## 2023-02-28 RX ORDER — SODIUM CHLORIDE 0.9 % (FLUSH) 0.9 %
5-40 SYRINGE (ML) INJECTION AS NEEDED
Status: DISCONTINUED | OUTPATIENT
Start: 2023-02-28 | End: 2023-02-28 | Stop reason: HOSPADM

## 2023-02-28 RX ORDER — CAPSAICIN 0.03 G/100G
CREAM TOPICAL 3 TIMES DAILY
Qty: 60 G | Refills: 0 | Status: SHIPPED | OUTPATIENT
Start: 2023-02-28

## 2023-02-28 RX ORDER — SODIUM CHLORIDE 0.9 % (FLUSH) 0.9 %
5-40 SYRINGE (ML) INJECTION EVERY 8 HOURS
Status: DISCONTINUED | OUTPATIENT
Start: 2023-02-28 | End: 2023-02-28 | Stop reason: HOSPADM

## 2023-02-28 RX ORDER — ONDANSETRON 2 MG/ML
4 INJECTION INTRAMUSCULAR; INTRAVENOUS
Status: COMPLETED | OUTPATIENT
Start: 2023-02-28 | End: 2023-02-28

## 2023-02-28 RX ORDER — ACETAMINOPHEN 500 MG
1000 TABLET ORAL ONCE
Status: COMPLETED | OUTPATIENT
Start: 2023-02-28 | End: 2023-02-28

## 2023-02-28 RX ADMIN — SODIUM CHLORIDE 500 ML: 9 INJECTION, SOLUTION INTRAVENOUS at 04:14

## 2023-02-28 RX ADMIN — ONDANSETRON 4 MG: 2 INJECTION INTRAMUSCULAR; INTRAVENOUS at 02:32

## 2023-02-28 RX ADMIN — SODIUM CHLORIDE, PRESERVATIVE FREE 10 ML: 5 INJECTION INTRAVENOUS at 04:17

## 2023-02-28 RX ADMIN — OXYCODONE 5 MG: 5 TABLET ORAL at 04:14

## 2023-02-28 RX ADMIN — SODIUM CHLORIDE, PRESERVATIVE FREE 10 ML: 5 INJECTION INTRAVENOUS at 04:18

## 2023-02-28 RX ADMIN — ACETAMINOPHEN 1000 MG: 500 TABLET ORAL at 02:33

## 2023-02-28 NOTE — ED PROVIDER NOTES
EMERGENCY DEPARTMENT HISTORY AND PHYSICAL EXAM      Date: 2/28/2023  Patient Name: Nivia Tovar    History of Presenting Illness     Chief Complaint   Patient presents with    Back Pain     Atraumatic mid-back pain x several days         HPI: History From: Patient, History limited by: none  Nivia Tovar, 80 y.o. female presents to the ED with cc of left arm and back pain. This started 2 days ago. She does report lifting some heavy coats. Otherwise denies falls or trauma. She denies any weakness numbness or tingling in the arms or legs. The pain radiates from the left upper arm to the left anterior chest and the left back. She reports some associated nausea. No vomiting, no diarrhea, no dysuria hematuria or incontinence, no fevers. Son states that last week she had a stomach virus of vomiting and diarrhea but that has since improved. She denies any coughing. There are no other complaints, changes, or physical findings at this time. PCP: Kimberli Meyers NP    No current facility-administered medications on file prior to encounter. Current Outpatient Medications on File Prior to Encounter   Medication Sig Dispense Refill    lisinopril (PRINIVIL, ZESTRIL) 20 mg tablet Take 40 mg by mouth daily. lisinopril (PRINIVIL, ZESTRIL) 5 mg tablet Take 5 mg by mouth daily. hydroCHLOROthiazide (HYDRODIURIL) 12.5 mg tablet Take 12.5 mg by mouth daily. clopidogrel (PLAVIX) 75 mg tab Take 75 mg by mouth. amLODIPine (NORVASC) 2.5 mg tablet Take 2 Tabs by mouth daily. 30 Tab 0    CRANBERRY FRUIT EXTRACT (CRANBERRY CONCENTRATE PO) Take  by mouth.      sucralfate (CARAFATE) 1 gram tablet Take 1 g by mouth four (4) times daily. atorvastatin (LIPITOR) 20 mg tablet Take 1 Tab by mouth daily. (Patient taking differently: Take 40 mg by mouth daily.) 90 Tab 3    multivitamins-minerals-lutein (CENTRUM SILVER) Tab Take 1 Tab by mouth.         brimonidine (ALPHAGAN) 0.15 % ophthalmic solution Administer 1 Drop to both eyes three (3) times daily. levothyroxine (SYNTHROID) 125 mcg tablet Take  by mouth daily (before breakfast). LATANOPROST (XALATAN OP) Apply 1 Drop to eye daily. Past History     Past Medical History:  Past Medical History:   Diagnosis Date    Aortic root dilatation (HCC)     mild    Arthritis     hands    CAD (coronary artery disease) 2001    started seeing Cardiologist 10 years ago for low pulse    Dyslipidemia     GERD (gastroesophageal reflux disease)     does not take anything other than Tums    HTN (hypertension)     Hypothyroidism     Thyroid disease        Past Surgical History:  Past Surgical History:   Procedure Laterality Date    UPPER GI ENDOSCOPY,CTRL BLEED  2/17/2017         VASCULAR SURGERY PROCEDURE UNLIST  2015    aortic aneurysm repair       Family History:  No family history on file. Social History:  Social History     Tobacco Use    Smoking status: Never    Smokeless tobacco: Never   Substance Use Topics    Alcohol use: No    Drug use: No       Allergies: Allergies   Allergen Reactions    Antibiotic [Beouw-Niejf-Qjnaqzt-Pramoxine] Other (comments)     Unknown antibiotic begins with a \"C\"         Physical Exam   Physical Exam  Constitutional:       General: She is not in acute distress. Appearance: She is not toxic-appearing. HENT:      Head: Normocephalic. Eyes:      Extraocular Movements: Extraocular movements intact. Cardiovascular:      Rate and Rhythm: Normal rate and regular rhythm. Pulmonary:      Effort: Pulmonary effort is normal.      Breath sounds: Normal breath sounds. Abdominal:      Palpations: Abdomen is soft. Tenderness: There is no abdominal tenderness. Musculoskeletal:      Comments:   No reproducible bony tenderness of the spine. Skin:     General: Skin is warm and dry.              Comments: She has dermatomal vesicular rash that extends from the left anterior chest wall around to the left thoracic back. Tender over the rash. Neurological:      General: No focal deficit present. Mental Status: She is alert. Comments: 5/5 strength with bicep flexion and extension bilaterally, 5/5 strength with ankle flexion and extension bilaterally. Sensation to light touch intact over upper and lower extremities bilaterally. Psychiatric:         Mood and Affect: Mood normal.       Diagnostic Study Results     Labs -     Recent Results (from the past 24 hour(s))   CBC WITH AUTOMATED DIFF    Collection Time: 02/28/23  1:44 AM   Result Value Ref Range    WBC 9.5 3.6 - 11.0 K/uL    RBC 5.42 (H) 3.80 - 5.20 M/uL    HGB 15.7 11.5 - 16.0 g/dL    HCT 47.0 35.0 - 47.0 %    MCV 86.7 80.0 - 99.0 FL    MCH 29.0 26.0 - 34.0 PG    MCHC 33.4 30.0 - 36.5 g/dL    RDW 13.5 11.5 - 14.5 %    PLATELET 121 222 - 910 K/uL    MPV 10.2 8.9 - 12.9 FL    NRBC 0.0 0  WBC    ABSOLUTE NRBC 0.00 0.00 - 0.01 K/uL    NEUTROPHILS 56 32 - 75 %    LYMPHOCYTES 30 12 - 49 %    MONOCYTES 10 5 - 13 %    EOSINOPHILS 3 0 - 7 %    BASOPHILS 1 0 - 1 %    IMMATURE GRANULOCYTES 0 0.0 - 0.5 %    ABS. NEUTROPHILS 5.3 1.8 - 8.0 K/UL    ABS. LYMPHOCYTES 2.9 0.8 - 3.5 K/UL    ABS. MONOCYTES 0.9 0.0 - 1.0 K/UL    ABS. EOSINOPHILS 0.3 0.0 - 0.4 K/UL    ABS. BASOPHILS 0.1 0.0 - 0.1 K/UL    ABS. IMM. GRANS. 0.0 0.00 - 0.04 K/UL    DF AUTOMATED     METABOLIC PANEL, COMPREHENSIVE    Collection Time: 02/28/23  1:44 AM   Result Value Ref Range    Sodium 135 (L) 136 - 145 mmol/L    Potassium 4.0 3.5 - 5.1 mmol/L    Chloride 102 97 - 108 mmol/L    CO2 28 21 - 32 mmol/L    Anion gap 5 5 - 15 mmol/L    Glucose 160 (H) 65 - 100 mg/dL    BUN 11 6 - 20 MG/DL    Creatinine 0.78 0.55 - 1.02 MG/DL    BUN/Creatinine ratio 14 12 - 20      eGFR >60 >60 ml/min/1.73m2    Calcium 9.4 8.5 - 10.1 MG/DL    Bilirubin, total 0.7 0.2 - 1.0 MG/DL    ALT (SGPT) 32 12 - 78 U/L    AST (SGOT) 41 (H) 15 - 37 U/L    Alk.  phosphatase 90 45 - 117 U/L    Protein, total 7.7 6.4 - 8.2 g/dL    Albumin 3.7 3.5 - 5.0 g/dL    Globulin 4.0 2.0 - 4.0 g/dL    A-G Ratio 0.9 (L) 1.1 - 2.2     TROPONIN-HIGH SENSITIVITY    Collection Time: 02/28/23  2:21 AM   Result Value Ref Range    Troponin-High Sensitivity 11 0 - 51 ng/L   EKG, 12 LEAD, INITIAL    Collection Time: 02/28/23  2:31 AM   Result Value Ref Range    Ventricular Rate 69 BPM    Atrial Rate 69 BPM    P-R Interval 218 ms    QRS Duration 84 ms    Q-T Interval 410 ms    QTC Calculation (Bezet) 439 ms    Calculated P Axis 11 degrees    Calculated R Axis -59 degrees    Calculated T Axis 54 degrees    Diagnosis       ** Poor data quality, interpretation may be adversely affected  Sinus rhythm with 1st degree AV block  Left axis deviation  Moderate voltage criteria for LVH, may be normal variant  Inferior infarct (cited on or before 22-MAR-2021)  Anterior infarct (cited on or before 22-MAR-2021)  When compared with ECG of 08-JUN-2022 20:26,  aberrant conduction is no longer present     URINALYSIS W/ REFLEX CULTURE    Collection Time: 02/28/23  3:37 AM    Specimen: Urine   Result Value Ref Range    Color YELLOW/STRAW      Appearance TURBID (A) CLEAR      Specific gravity 1.020      pH (UA) 8.0 5.0 - 8.0      Protein TRACE (A) NEG mg/dL    Glucose Negative NEG mg/dL    Ketone TRACE (A) NEG mg/dL    Bilirubin Negative NEG      Blood TRACE (A) NEG      Urobilinogen 1.0 0.2 - 1.0 EU/dL    Nitrites Negative NEG      Leukocyte Esterase MODERATE (A) NEG      WBC PENDING /hpf    RBC PENDING /hpf    Epithelial cells PENDING /lpf    Bacteria PENDING /hpf    UA:UC IF INDICATED PENDING        Radiologic Studies -   XR CHEST PA LAT   Final Result   No acute process. CT Results  (Last 48 hours)      None          CXR Results  (Last 48 hours)                 02/28/23 0232  XR CHEST PA LAT Final result    Impression:  No acute process.             Narrative:  INDICATION: mid back pain, atraumatic       COMPARISON: June 8, 2022       FINDINGS:       Frontal and lateral views of the chest demonstrate a normal cardiomediastinal   silhouette. Tortuous aorta. The lungs are adequately expanded. There is no   edema, effusion, consolidation, or pneumothorax. The osseous structures are   unremarkable. Medical Decision Making   I am the first provider for this patient. I reviewed the vital signs, available nursing notes, past medical history, past surgical history, family history and social history. Vital Signs-Reviewed the patient's vital signs. Patient Vitals for the past 24 hrs:   Temp Pulse Resp BP SpO2   02/28/23 0435 98.2 °F (36.8 °C) 77 -- (!) 171/82 98 %   02/28/23 0133 -- (!) 55 -- (!) 113/59 98 %   02/27/23 2358 98.6 °F (37 °C) 68 16 (!) 169/91 96 %         Provider Notes (Medical Decision Making):   51-year-old female presenting with chest and back pain. She has a vesicular dermatomal rash over the area of pain, this is consistent with shingles. She is otherwise afebrile and nontoxic-appearing, unlikely systemic infection. She denies falls or trauma, no bony tenderness of the midline, unlikely acute osseous injury. Her neurologic exam is unremarkable. She does report some associated chest pain, however I suspect this is likely due to her shingles, however given age and committees, will initiate atypical ACS work-up. She is otherwise saturating 98% on room air, not tachycardic, lungs clear, unlikely pneumonia, CHF, PE, or any other acute cardiopulmonary emergency. ED Course:     Initial assessment performed. The patients presenting problems have been discussed, and they are in agreement with the care plan formulated and outlined with them. I have encouraged them to ask questions as they arise throughout their visit.     Medications   sodium chloride (NS) flush 5-40 mL (10 mL IntraVENous Given 2/28/23 0418)   sodium chloride (NS) flush 5-40 mL (10 mL IntraVENous Given 2/28/23 0417)   sodium chloride 0.9 % bolus infusion 500 mL (500 mL IntraVENous New Bag 2/28/23 0414)   ondansetron (ZOFRAN) injection 4 mg (4 mg IntraVENous Given 2/28/23 0232)   acetaminophen (TYLENOL) tablet 1,000 mg (1,000 mg Oral Given 2/28/23 0233)   oxyCODONE IR (ROXICODONE) tablet 5 mg (5 mg Oral Given 2/28/23 0414)              CBC negative for leukocytosis or anemia. Basic metabolic panel normal renal function, no worrisome electrolyte abnormalities, troponin reassuring at 11, UA suggestive of possible cystitis. Chest x-ray independently interpreted by myself as no acute infiltrate. EKG is performed at 2: 31, independently interpreted by myself as sinus rhythm with first-degree AV block at a rate of 69, no ST segment changes concerning for ACS. I reviewed her procedure from 2/17/2017, she had an EGD, with stomach ulcer. On reevaluation, patient is resting comfortably and states that they feel improved. She is able to tolerate p.o. Repeat vitals show that she is hypertensive otherwise vitals are unremarkable. She has been hypertensive on multiple prior visits previously, and has known history of hypertension. Patient is counseled on supportive care and return precautions. Will return to the ED for any worsening pain, fever, vomiting, flank pain, or any new or worrisome symptoms. Will followup with primary care doctor within 2 days. Critical Care Time:         Disposition:  [facility]  [facility] have been reviewed with her. She has been counseled regarding her diagnosis, treatment, and plan. She verbally conveys understanding and agreement of the signs, symptoms, diagnosis, treatment and prognosis and additionally agrees to follow up as discussed. She also agrees with the care-plan and conveys that all of her questions have been answered.   I have also provided discharge instructions for her that include: educational information regarding their diagnosis and treatment, and list of reasons why they would want to return to the ED prior to their follow-up appointment, should her condition change. PLAN:  1. Current Discharge Medication List        START taking these medications    Details   valACYclovir (VALTREX) 1 gram tablet Take 1 Tablet by mouth three (3) times daily for 7 days. Qty: 21 Tablet, Refills: 0  Start date: 2/28/2023, End date: 3/7/2023      capsicum oleoresin 0.025 % topical cream Apply  to affected area three (3) times daily. Qty: 60 g, Refills: 0  Start date: 2/28/2023      cefdinir (OMNICEF) 300 mg capsule Take 1 Capsule by mouth two (2) times a day. Qty: 10 Capsule, Refills: 0  Start date: 2/28/2023           2.    Follow-up Information    None       Return to ED if worse     Diagnosis     Clinical Impression: Acute shingles, acute cystitis

## 2023-02-28 NOTE — ED NOTES
Pt now reporting nausea, hunched over in wheelchair. change in VS noted.  pts son states she ambulated to bathroom, and then when she came out she was having excruciating pain

## 2023-02-28 NOTE — ED NOTES
Pt discharged by MD. Pt provided with discharge instructions Rx and instructions on follow up care. Pt out of ED via wheelchair accompanied by family.

## 2024-06-18 ENCOUNTER — OFFICE VISIT (OUTPATIENT)
Age: 89
End: 2024-06-18
Payer: MEDICARE

## 2024-06-18 VITALS
WEIGHT: 109 LBS | SYSTOLIC BLOOD PRESSURE: 100 MMHG | OXYGEN SATURATION: 97 % | HEIGHT: 59 IN | DIASTOLIC BLOOD PRESSURE: 60 MMHG | HEART RATE: 71 BPM | BODY MASS INDEX: 21.97 KG/M2

## 2024-06-18 DIAGNOSIS — I34.0 NONRHEUMATIC MITRAL VALVE REGURGITATION: ICD-10-CM

## 2024-06-18 DIAGNOSIS — I10 ESSENTIAL HYPERTENSION, BENIGN: ICD-10-CM

## 2024-06-18 DIAGNOSIS — I71.21 ANEURYSM OF ASCENDING AORTA WITHOUT RUPTURE (HCC): ICD-10-CM

## 2024-06-18 DIAGNOSIS — I48.0 PAROXYSMAL ATRIAL FIBRILLATION (HCC): Primary | ICD-10-CM

## 2024-06-18 DIAGNOSIS — I35.1 NONRHEUMATIC AORTIC VALVE INSUFFICIENCY: ICD-10-CM

## 2024-06-18 DIAGNOSIS — E78.5 DYSLIPIDEMIA: ICD-10-CM

## 2024-06-18 DIAGNOSIS — R00.2 PALPITATIONS: ICD-10-CM

## 2024-06-18 PROCEDURE — 99204 OFFICE O/P NEW MOD 45 MIN: CPT | Performed by: SPECIALIST

## 2024-06-18 PROCEDURE — 1123F ACP DISCUSS/DSCN MKR DOCD: CPT | Performed by: SPECIALIST

## 2024-06-18 RX ORDER — LEVOTHYROXINE SODIUM 88 UG/1
88 TABLET ORAL DAILY
COMMUNITY
Start: 2024-04-17

## 2024-06-18 RX ORDER — SERTRALINE HYDROCHLORIDE 25 MG/1
25 TABLET, FILM COATED ORAL
COMMUNITY

## 2024-06-18 RX ORDER — AMLODIPINE BESYLATE 5 MG/1
5 TABLET ORAL DAILY
COMMUNITY
Start: 2024-03-17

## 2024-06-18 RX ORDER — CLONIDINE HYDROCHLORIDE 0.1 MG/1
0.1 TABLET ORAL 2 TIMES DAILY
COMMUNITY

## 2024-06-18 RX ORDER — LATANOPROST 50 UG/ML
1 SOLUTION/ DROPS OPHTHALMIC DAILY
COMMUNITY

## 2024-06-18 RX ORDER — APIXABAN 5 MG/1
2.5 TABLET, FILM COATED ORAL 2 TIMES DAILY
COMMUNITY

## 2024-06-18 RX ORDER — ATORVASTATIN CALCIUM 40 MG/1
40 TABLET, FILM COATED ORAL NIGHTLY
COMMUNITY
Start: 2024-03-31

## 2024-06-18 RX ORDER — LISINOPRIL AND HYDROCHLOROTHIAZIDE 20; 12.5 MG/1; MG/1
2 TABLET ORAL DAILY
COMMUNITY

## 2024-06-18 RX ORDER — BRIMONIDINE TARTRATE 2 MG/ML
1 SOLUTION/ DROPS OPHTHALMIC EVERY 12 HOURS
COMMUNITY
Start: 2024-06-08

## 2024-06-18 ASSESSMENT — PATIENT HEALTH QUESTIONNAIRE - PHQ9
2. FEELING DOWN, DEPRESSED OR HOPELESS: NOT AT ALL
SUM OF ALL RESPONSES TO PHQ9 QUESTIONS 1 & 2: 0
SUM OF ALL RESPONSES TO PHQ QUESTIONS 1-9: 0
SUM OF ALL RESPONSES TO PHQ QUESTIONS 1-9: 0
1. LITTLE INTEREST OR PLEASURE IN DOING THINGS: NOT AT ALL
SUM OF ALL RESPONSES TO PHQ QUESTIONS 1-9: 0
SUM OF ALL RESPONSES TO PHQ QUESTIONS 1-9: 0

## 2024-06-18 NOTE — PROGRESS NOTES
tablet Take 2 tablets by mouth daily      Multiple Vitamins-Minerals (CENTRUM SILVER 50+MEN PO) Take by mouth      cloNIDine (CATAPRES) 0.1 MG tablet Take 1 tablet by mouth 2 times daily Once at 8 am,once at 8 pm      sertraline (ZOLOFT) 25 MG tablet Take 1 tablet by mouth nightly as needed      atorvastatin (LIPITOR) 40 MG tablet Take 1 tablet by mouth nightly      amLODIPine (NORVASC) 5 MG tablet Take 1 tablet by mouth daily      levothyroxine (SYNTHROID) 88 MCG tablet Take 1 tablet by mouth daily      ELIQUIS 5 MG TABS tablet Take 0.5 tablets by mouth 2 times daily       No current facility-administered medications for this visit.       ASSESSMENT & PLAN:      She is stable and for the most part asymptomatic, well compensated on a good medical regimen.  We will see verify what she is actually taking make sure the doses are appropriate given her age and laboratory evaluation.  She will need periodic echocardiograms to evaluate her aorta and her valvular regurgitation.    Current treatment plan is effective,reviewed diet, exercise and weight control     1. Paroxysmal atrial fibrillation (HCC)  2. Essential hypertension, benign  3. Dyslipidemia  4. Palpitations  5. Aneurysm of ascending aorta without rupture (HCC)  6. Nonrheumatic aortic valve insufficiency  7. Nonrheumatic mitral valve regurgitation       Future Appointments   Date Time Provider Department Center   12/2/2024 11:20 AM [facility]   12/3/2024 10:40 AM Clifford Foley III, MD [facility] BS AMB        Clifford Foley MD  6/18/2024  Please note that this dictation was completed with [employer], the computer voice recognition software.  Quite often unanticipated grammatical, syntax, homophones, and other interpretive errors are inadvertently transcribed by the computer software.  Please disregard these errors.  Please excuse any errors that have escaped final proofreading.  Thank you.

## 2024-07-11 ENCOUNTER — APPOINTMENT (OUTPATIENT)
Facility: HOSPITAL | Age: 89
End: 2024-07-11
Payer: MEDICARE

## 2024-07-11 ENCOUNTER — HOSPITAL ENCOUNTER (INPATIENT)
Facility: HOSPITAL | Age: 89
LOS: 8 days | Discharge: NURSING, REHAB OR LONG-TERM CARE | End: 2024-07-19
Attending: EMERGENCY MEDICINE | Admitting: STUDENT IN AN ORGANIZED HEALTH CARE EDUCATION/TRAINING PROGRAM
Payer: MEDICARE

## 2024-07-11 DIAGNOSIS — S32.10XA CLOSED FRACTURE OF SACRUM, UNSPECIFIED PORTION OF SACRUM, INITIAL ENCOUNTER (HCC): ICD-10-CM

## 2024-07-11 DIAGNOSIS — S42.202A CLOSED FRACTURE OF PROXIMAL END OF LEFT HUMERUS, UNSPECIFIED FRACTURE MORPHOLOGY, INITIAL ENCOUNTER: ICD-10-CM

## 2024-07-11 DIAGNOSIS — S32.592A CLOSED FRACTURE OF MULTIPLE RAMI OF LEFT PUBIS, INITIAL ENCOUNTER (HCC): Primary | ICD-10-CM

## 2024-07-11 PROBLEM — T14.8XXA FRACTURE: Status: ACTIVE | Noted: 2024-07-11

## 2024-07-11 LAB
ALBUMIN SERPL-MCNC: 3 G/DL (ref 3.5–5)
ALBUMIN/GLOB SERPL: 0.9 (ref 1.1–2.2)
ALP SERPL-CCNC: 113 U/L (ref 45–117)
ALT SERPL-CCNC: 18 U/L (ref 12–78)
ANION GAP SERPL CALC-SCNC: 5 MMOL/L (ref 5–15)
AST SERPL-CCNC: 17 U/L (ref 15–37)
BASOPHILS # BLD: 0.1 K/UL (ref 0–0.1)
BASOPHILS NFR BLD: 0 % (ref 0–1)
BILIRUB SERPL-MCNC: 0.7 MG/DL (ref 0.2–1)
BUN SERPL-MCNC: 13 MG/DL (ref 6–20)
BUN/CREAT SERPL: 19 (ref 12–20)
CALCIUM SERPL-MCNC: 8.8 MG/DL (ref 8.5–10.1)
CHLORIDE SERPL-SCNC: 101 MMOL/L (ref 97–108)
CO2 SERPL-SCNC: 30 MMOL/L (ref 21–32)
CREAT SERPL-MCNC: 0.7 MG/DL (ref 0.55–1.02)
DIFFERENTIAL METHOD BLD: ABNORMAL
EOSINOPHIL # BLD: 0.1 K/UL (ref 0–0.4)
EOSINOPHIL NFR BLD: 1 % (ref 0–7)
ERYTHROCYTE [DISTWIDTH] IN BLOOD BY AUTOMATED COUNT: 13.2 % (ref 11.5–14.5)
GLOBULIN SER CALC-MCNC: 3.2 G/DL (ref 2–4)
GLUCOSE SERPL-MCNC: 193 MG/DL (ref 65–100)
HCT VFR BLD AUTO: 37.3 % (ref 35–47)
HGB BLD-MCNC: 12 G/DL (ref 11.5–16)
IMM GRANULOCYTES # BLD AUTO: 0.1 K/UL (ref 0–0.04)
IMM GRANULOCYTES NFR BLD AUTO: 1 % (ref 0–0.5)
INR PPP: 1.1 (ref 0.9–1.1)
LYMPHOCYTES # BLD: 1.8 K/UL (ref 0.8–3.5)
LYMPHOCYTES NFR BLD: 14 % (ref 12–49)
MCH RBC QN AUTO: 27.7 PG (ref 26–34)
MCHC RBC AUTO-ENTMCNC: 32.2 G/DL (ref 30–36.5)
MCV RBC AUTO: 86.1 FL (ref 80–99)
MONOCYTES # BLD: 1 K/UL (ref 0–1)
MONOCYTES NFR BLD: 8 % (ref 5–13)
NEUTS SEG # BLD: 9.6 K/UL (ref 1.8–8)
NEUTS SEG NFR BLD: 76 % (ref 32–75)
NRBC # BLD: 0 K/UL (ref 0–0.01)
NRBC BLD-RTO: 0 PER 100 WBC
PLATELET # BLD AUTO: 190 K/UL (ref 150–400)
PMV BLD AUTO: 10.2 FL (ref 8.9–12.9)
POTASSIUM SERPL-SCNC: 3.8 MMOL/L (ref 3.5–5.1)
PROT SERPL-MCNC: 6.2 G/DL (ref 6.4–8.2)
PROTHROMBIN TIME: 11.5 SEC (ref 9–11.1)
RBC # BLD AUTO: 4.33 M/UL (ref 3.8–5.2)
SODIUM SERPL-SCNC: 136 MMOL/L (ref 136–145)
WBC # BLD AUTO: 12.6 K/UL (ref 3.6–11)

## 2024-07-11 PROCEDURE — 85610 PROTHROMBIN TIME: CPT

## 2024-07-11 PROCEDURE — 6370000000 HC RX 637 (ALT 250 FOR IP): Performed by: STUDENT IN AN ORGANIZED HEALTH CARE EDUCATION/TRAINING PROGRAM

## 2024-07-11 PROCEDURE — 73030 X-RAY EXAM OF SHOULDER: CPT

## 2024-07-11 PROCEDURE — 72192 CT PELVIS W/O DYE: CPT

## 2024-07-11 PROCEDURE — 73502 X-RAY EXAM HIP UNI 2-3 VIEWS: CPT

## 2024-07-11 PROCEDURE — 2580000003 HC RX 258: Performed by: STUDENT IN AN ORGANIZED HEALTH CARE EDUCATION/TRAINING PROGRAM

## 2024-07-11 PROCEDURE — 85025 COMPLETE CBC W/AUTO DIFF WBC: CPT

## 2024-07-11 PROCEDURE — 6360000002 HC RX W HCPCS: Performed by: EMERGENCY MEDICINE

## 2024-07-11 PROCEDURE — 99285 EMERGENCY DEPT VISIT HI MDM: CPT

## 2024-07-11 PROCEDURE — 36415 COLL VENOUS BLD VENIPUNCTURE: CPT

## 2024-07-11 PROCEDURE — 96375 TX/PRO/DX INJ NEW DRUG ADDON: CPT

## 2024-07-11 PROCEDURE — 96374 THER/PROPH/DIAG INJ IV PUSH: CPT

## 2024-07-11 PROCEDURE — 1100000000 HC RM PRIVATE

## 2024-07-11 PROCEDURE — 6370000000 HC RX 637 (ALT 250 FOR IP): Performed by: EMERGENCY MEDICINE

## 2024-07-11 PROCEDURE — 80053 COMPREHEN METABOLIC PANEL: CPT

## 2024-07-11 RX ORDER — POLYETHYLENE GLYCOL 3350 17 G/17G
17 POWDER, FOR SOLUTION ORAL DAILY
Status: DISCONTINUED | OUTPATIENT
Start: 2024-07-12 | End: 2024-07-19 | Stop reason: HOSPADM

## 2024-07-11 RX ORDER — ONDANSETRON 2 MG/ML
4 INJECTION INTRAMUSCULAR; INTRAVENOUS ONCE
Status: COMPLETED | OUTPATIENT
Start: 2024-07-11 | End: 2024-07-11

## 2024-07-11 RX ORDER — LISINOPRIL 20 MG/1
20 TABLET ORAL DAILY
Status: DISCONTINUED | OUTPATIENT
Start: 2024-07-12 | End: 2024-07-15

## 2024-07-11 RX ORDER — HYDROCHLOROTHIAZIDE 25 MG/1
12.5 TABLET ORAL DAILY
Status: DISCONTINUED | OUTPATIENT
Start: 2024-07-12 | End: 2024-07-13

## 2024-07-11 RX ORDER — POLYETHYLENE GLYCOL 3350 17 G/17G
17 POWDER, FOR SOLUTION ORAL DAILY PRN
Status: DISCONTINUED | OUTPATIENT
Start: 2024-07-11 | End: 2024-07-11

## 2024-07-11 RX ORDER — SODIUM CHLORIDE 0.9 % (FLUSH) 0.9 %
5-40 SYRINGE (ML) INJECTION PRN
Status: DISCONTINUED | OUTPATIENT
Start: 2024-07-11 | End: 2024-07-19 | Stop reason: HOSPADM

## 2024-07-11 RX ORDER — ATORVASTATIN CALCIUM 40 MG/1
40 TABLET, FILM COATED ORAL NIGHTLY
Status: DISCONTINUED | OUTPATIENT
Start: 2024-07-11 | End: 2024-07-19 | Stop reason: HOSPADM

## 2024-07-11 RX ORDER — DIAZEPAM 2 MG/1
2 TABLET ORAL ONCE
Status: COMPLETED | OUTPATIENT
Start: 2024-07-11 | End: 2024-07-11

## 2024-07-11 RX ORDER — ACETAMINOPHEN 325 MG/1
650 TABLET ORAL EVERY 6 HOURS PRN
Status: DISCONTINUED | OUTPATIENT
Start: 2024-07-11 | End: 2024-07-15

## 2024-07-11 RX ORDER — CLONIDINE HYDROCHLORIDE 0.1 MG/1
0.1 TABLET ORAL 2 TIMES DAILY
Status: DISCONTINUED | OUTPATIENT
Start: 2024-07-11 | End: 2024-07-15

## 2024-07-11 RX ORDER — ENOXAPARIN SODIUM 100 MG/ML
1 INJECTION SUBCUTANEOUS 2 TIMES DAILY
Status: DISCONTINUED | OUTPATIENT
Start: 2024-07-12 | End: 2024-07-17

## 2024-07-11 RX ORDER — AMLODIPINE BESYLATE 5 MG/1
5 TABLET ORAL DAILY
Status: DISCONTINUED | OUTPATIENT
Start: 2024-07-12 | End: 2024-07-15

## 2024-07-11 RX ORDER — BRIMONIDINE TARTRATE 2 MG/ML
1 SOLUTION/ DROPS OPHTHALMIC 2 TIMES DAILY
Status: DISCONTINUED | OUTPATIENT
Start: 2024-07-11 | End: 2024-07-19 | Stop reason: HOSPADM

## 2024-07-11 RX ORDER — MORPHINE SULFATE 2 MG/ML
2 INJECTION, SOLUTION INTRAMUSCULAR; INTRAVENOUS EVERY 4 HOURS PRN
Status: DISCONTINUED | OUTPATIENT
Start: 2024-07-11 | End: 2024-07-19 | Stop reason: HOSPADM

## 2024-07-11 RX ORDER — ONDANSETRON 2 MG/ML
4 INJECTION INTRAMUSCULAR; INTRAVENOUS EVERY 6 HOURS PRN
Status: DISCONTINUED | OUTPATIENT
Start: 2024-07-11 | End: 2024-07-19 | Stop reason: HOSPADM

## 2024-07-11 RX ORDER — HYDROCODONE BITARTRATE AND ACETAMINOPHEN 10; 325 MG/1; MG/1
1 TABLET ORAL EVERY 6 HOURS PRN
Status: DISCONTINUED | OUTPATIENT
Start: 2024-07-11 | End: 2024-07-19 | Stop reason: HOSPADM

## 2024-07-11 RX ORDER — SODIUM CHLORIDE 0.9 % (FLUSH) 0.9 %
5-40 SYRINGE (ML) INJECTION EVERY 12 HOURS SCHEDULED
Status: DISCONTINUED | OUTPATIENT
Start: 2024-07-11 | End: 2024-07-19 | Stop reason: HOSPADM

## 2024-07-11 RX ORDER — ENOXAPARIN SODIUM 100 MG/ML
40 INJECTION SUBCUTANEOUS DAILY
Status: DISCONTINUED | OUTPATIENT
Start: 2024-07-11 | End: 2024-07-11

## 2024-07-11 RX ORDER — LISINOPRIL AND HYDROCHLOROTHIAZIDE 20; 12.5 MG/1; MG/1
2 TABLET ORAL DAILY
Status: DISCONTINUED | OUTPATIENT
Start: 2024-07-12 | End: 2024-07-11 | Stop reason: CLARIF

## 2024-07-11 RX ORDER — ONDANSETRON 4 MG/1
4 TABLET, ORALLY DISINTEGRATING ORAL EVERY 8 HOURS PRN
Status: DISCONTINUED | OUTPATIENT
Start: 2024-07-11 | End: 2024-07-19 | Stop reason: HOSPADM

## 2024-07-11 RX ORDER — SERTRALINE HYDROCHLORIDE 25 MG/1
25 TABLET, FILM COATED ORAL NIGHTLY
Status: DISCONTINUED | OUTPATIENT
Start: 2024-07-11 | End: 2024-07-19 | Stop reason: HOSPADM

## 2024-07-11 RX ORDER — SODIUM CHLORIDE 9 MG/ML
INJECTION, SOLUTION INTRAVENOUS PRN
Status: DISCONTINUED | OUTPATIENT
Start: 2024-07-11 | End: 2024-07-19 | Stop reason: HOSPADM

## 2024-07-11 RX ORDER — LEVOTHYROXINE SODIUM 88 UG/1
88 TABLET ORAL DAILY
Status: DISCONTINUED | OUTPATIENT
Start: 2024-07-12 | End: 2024-07-19 | Stop reason: HOSPADM

## 2024-07-11 RX ORDER — FENTANYL CITRATE 50 UG/ML
50 INJECTION, SOLUTION INTRAMUSCULAR; INTRAVENOUS
Status: COMPLETED | OUTPATIENT
Start: 2024-07-11 | End: 2024-07-11

## 2024-07-11 RX ORDER — KETOROLAC TROMETHAMINE 30 MG/ML
15 INJECTION, SOLUTION INTRAMUSCULAR; INTRAVENOUS
Status: COMPLETED | OUTPATIENT
Start: 2024-07-11 | End: 2024-07-11

## 2024-07-11 RX ORDER — ACETAMINOPHEN 325 MG/1
650 TABLET ORAL EVERY 4 HOURS PRN
Status: DISCONTINUED | OUTPATIENT
Start: 2024-07-11 | End: 2024-07-15 | Stop reason: SDUPTHER

## 2024-07-11 RX ORDER — ACETAMINOPHEN 650 MG/1
650 SUPPOSITORY RECTAL EVERY 6 HOURS PRN
Status: DISCONTINUED | OUTPATIENT
Start: 2024-07-11 | End: 2024-07-15

## 2024-07-11 RX ORDER — LATANOPROST 50 UG/ML
1 SOLUTION/ DROPS OPHTHALMIC NIGHTLY
Status: DISCONTINUED | OUTPATIENT
Start: 2024-07-11 | End: 2024-07-19 | Stop reason: HOSPADM

## 2024-07-11 RX ADMIN — ACETAMINOPHEN 650 MG: 325 TABLET ORAL at 21:56

## 2024-07-11 RX ADMIN — FENTANYL CITRATE 50 MCG: 50 INJECTION INTRAMUSCULAR; INTRAVENOUS at 15:19

## 2024-07-11 RX ADMIN — CLONIDINE HYDROCHLORIDE 0.1 MG: 0.1 TABLET ORAL at 21:32

## 2024-07-11 RX ADMIN — DIAZEPAM 2 MG: 2 TABLET ORAL at 16:00

## 2024-07-11 RX ADMIN — SODIUM CHLORIDE, PRESERVATIVE FREE 10 ML: 5 INJECTION INTRAVENOUS at 21:33

## 2024-07-11 RX ADMIN — ATORVASTATIN CALCIUM 40 MG: 40 TABLET, FILM COATED ORAL at 21:32

## 2024-07-11 RX ADMIN — ONDANSETRON 4 MG: 4 TABLET, ORALLY DISINTEGRATING ORAL at 20:05

## 2024-07-11 RX ADMIN — SERTRALINE HYDROCHLORIDE 25 MG: 25 TABLET ORAL at 21:32

## 2024-07-11 RX ADMIN — HYDROCODONE BITARTRATE AND ACETAMINOPHEN 1 TABLET: 10; 325 TABLET ORAL at 19:12

## 2024-07-11 RX ADMIN — BRIMONIDINE TARTRATE 1 DROP: 2 SOLUTION OPHTHALMIC at 21:56

## 2024-07-11 RX ADMIN — ONDANSETRON 4 MG: 2 INJECTION INTRAMUSCULAR; INTRAVENOUS at 15:19

## 2024-07-11 RX ADMIN — LATANOPROST 1 DROP: 50 SOLUTION OPHTHALMIC at 21:58

## 2024-07-11 RX ADMIN — KETOROLAC TROMETHAMINE 15 MG: 30 INJECTION, SOLUTION INTRAMUSCULAR at 15:19

## 2024-07-11 ASSESSMENT — PAIN SCALES - GENERAL
PAINLEVEL_OUTOF10: 8
PAINLEVEL_OUTOF10: 7
PAINLEVEL_OUTOF10: 5
PAINLEVEL_OUTOF10: 6
PAINLEVEL_OUTOF10: 4
PAINLEVEL_OUTOF10: 10

## 2024-07-11 ASSESSMENT — PAIN DESCRIPTION - ORIENTATION
ORIENTATION: LEFT

## 2024-07-11 ASSESSMENT — PAIN - FUNCTIONAL ASSESSMENT: PAIN_FUNCTIONAL_ASSESSMENT: 0-10

## 2024-07-11 ASSESSMENT — LIFESTYLE VARIABLES
HOW OFTEN DO YOU HAVE A DRINK CONTAINING ALCOHOL: NEVER
HOW MANY STANDARD DRINKS CONTAINING ALCOHOL DO YOU HAVE ON A TYPICAL DAY: PATIENT DOES NOT DRINK

## 2024-07-11 ASSESSMENT — PAIN DESCRIPTION - LOCATION
LOCATION: SHOULDER
LOCATION: SHOULDER;LEG
LOCATION: SHOULDER;ARM

## 2024-07-11 ASSESSMENT — PAIN DESCRIPTION - DESCRIPTORS
DESCRIPTORS: THROBBING
DESCRIPTORS: ACHING;SORE

## 2024-07-11 NOTE — ED PROVIDER NOTES
[facility] EMERGENCY DEPT  EMERGENCY DEPARTMENT ENCOUNTER       Pt Name: Bebe Burnett  MRN: [id number]  Birthdate 10/21/1934  Date of evaluation: 7/11/2024  Provider: Dagoberto Bradshaw DO   PCP: Patricia Hoffmann APRN - NP  Note Started: 6:00 PM EDT 7/11/24     CHIEF COMPLAINT       Chief Complaint   Patient presents with    Fall     Pt BIBEMS from home after tripping on the first step leading into her home. Pt reports L shoulder pain        HISTORY OF PRESENT ILLNESS: 1 or more elements      History From: patient, History limited by: none     Bebe Burnett is a 89 y.o. female presents to the ED by EMS for shoulder pain following GLF.        Please See MDM for Additional Details of the HPI/PMH  Nursing Notes were all reviewed and agreed with or any disagreements were addressed in the HPI.     REVIEW OF SYSTEMS        Positives and Pertinent negatives as per HPI.    PAST HISTORY     Past Medical History:  Past Medical History:   Diagnosis Date    Aortic root dilatation (HCC)     mild    Arthritis     hands    CAD (coronary artery disease) 2001    started seeing Cardiologist 10 years ago for low pulse    Dyslipidemia     GERD (gastroesophageal reflux disease)     does not take anything other than Tums    HTN (hypertension)     Hypothyroidism     Thyroid disease        Past Surgical History:  Past Surgical History:   Procedure Laterality Date    UPPER GI ENDOSCOPY,CTRL BLEED  2/17/2017         VASCULAR SURGERY  2015    aortic aneurysm repair       Family History:  History reviewed. No pertinent family history.    Social History:  Social History     Tobacco Use    Smoking status: Never    Smokeless tobacco: Never   Substance Use Topics    Alcohol use: No    Drug use: No       Allergies:  Allergies   Allergen Reactions    Ysxee-Xlvia-Tmqlxwj-Pramoxine Other (See Comments)     Unknown antibiotic begins with a \"C\"       CURRENT MEDICATIONS      Previous Medications    AMLODIPINE (NORVASC) 5 MG TABLET    Take 1 tablet  CP or SOA. She is c/o severe pain in left shoulder worse with any movement. She denies any other pain at the time of evaluation.     Will start IV for pain medication. Will order pain meds IV in addition to PO Valium, will obtain shoulder Xray.     Shoulder Xray shows proximal humerus fracture.     Pt re-evaluated now has pain in the left hip. Plain film imaging obtained, ? Possible pubic rami fracture. Will order CT of pelvis.       Consult note:  Case discussed with Dr. Mathis, orthopedic surgery.  States patient can be placed in sling and is stable for discharge from a humerus standpoint.    CT of the pelvis showed inferior and superior pubic rami fractures on the left with sacral ala fracture.  Given the pelvic fractures will consult hospitalist for admission. Will obtain baseline labs CBC, CMP, PT INR    Consult:  Case discussed with hospitalist. Pt will be evaluated and admitted.     FINAL IMPRESSION     1. Closed fracture of multiple rami of left pubis, initial encounter ([facility])    2. Closed fracture of sacrum, unspecified portion of sacrum, initial encounter ([facility])    3. Closed fracture of proximal end of left humerus, unspecified fracture morphology, initial encounter          DISPOSITION/PLAN   Bebe DIAZ Lex's  results have been reviewed with her.  She has been counseled regarding her diagnosis, treatment, and plan.  She verbally conveys understanding and agreement of the signs, symptoms, diagnosis, treatment and prognosis and additionally agrees to follow up as discussed.  She also agrees with the care-plan and conveys that all of her questions have been answered.      CLINICAL IMPRESSION    Admit Note: Pt is being admitted by hospitalist. The results of their tests and reason(s) for their admission have been discussed with pt and/or available family. They convey agreement and understanding for the need to be admitted and for the admission diagnosis.       I am the Primary Clinician of Record.   Dagoberto

## 2024-07-11 NOTE — H&P
Hospitalist Admission Note    NAME:   Bebe Burnett   : 10/21/1934   MRN: [id number]     Date/Time: 2024 6:44 PM    Patient PCP: Patricia Hoffmann APRN - NP    ______________________________________________________________________  Given the patient's current clinical presentation, I have a high level of concern for decompensation if discharged from the emergency department.  Complex decision making was performed, which includes reviewing the patient's available past medical records, laboratory results, and x-ray films.       My assessment of this patient's clinical condition and my plan of care is as follows.    Assessment / Plan:    Acute left superior and inferior pubic ramus fractures:  Subacute to chronic appearing bilateral sacral insufficiency fractures:  -Orthopedic surgery consulted.  -Pain medications ordered.  -Eliquis held.  -Full dose Lovenox instead of Eliquis.    Essential hypertension:  Hyperlipidemia:  Paroxysmal atrial fibrillation:  Coronary artery disease:  GERD:  Hypothyroidism:  Ascending aortic aneurysm:  -Resume prior to admission medications lisinopril, hydrochlorothiazide, clonidine, Lipitor, Norvasc, Synthroid, Zoloft.  -Patient takes Eliquis 2.5 mg twice a day.  On hold.  Started Lovenox instead anticipating surgical procedure.    Medical Decision Making:   I personally reviewed labs: cbc, bmp  I personally reviewed imaging:CT  I personally reviewed EKG:  Toxic drug monitoring: none  Discussed case with: ED provider. After discussion I am in agreement that acuity of patient's medical condition necessitates hospital stay.      Code Status: Full  DVT Prophylaxis: Lovenox  Baseline: Functional and Independent    Subjective:   CHIEF COMPLAINT: Fall    HISTORY OF PRESENT ILLNESS:     She is 89-year-old lady with past medical history significant for essential hypertension, hyperlipidemia, paroxysmal atrial fibrillation on Eliquis, ascending aortic aneurysm, coronary  tissues: Hematoma surrounding the left superior pubic ramus fracture. Atherosclerosis. Diverticulosis coli. Right common iliac artery stent.     Acute left superior and inferior pubic ramus fractures. Subacute-chronic appearing bilateral sacral insufficiency fractures. Electronically signed by KRUPA PRINCE    XR HIP 2-3 VW W PELVIS LEFT    Result Date: 7/11/2024  EXAM: XR HIP 2-3 VW W PELVIS LEFT INDICATION: pain, fall. COMPARISON: CT abdomen pelvis 6/8/2022. FINDINGS: Diffuse osteopenia limits evaluation. AP view of the pelvis and a frogleg lateral view of the left hip. Possible left superior pubic ramus fracture. No dislocation. Degenerative changes.     Diffuse osteopenia evaluation. Possible left superior pubic ramus fracture. Electronically signed by KRUPA PRINCE    XR SHOULDER LEFT (MIN 2 VIEWS)    Result Date: 7/11/2024  EXAM: XR SHOULDER LEFT (MIN 2 VIEWS) INDICATION: Pain, s/p fall. COMPARISON: None. FINDINGS: 2 views of the left shoulder demonstrate osteopenia, which limits evaluation. Comminuted fracture of the left humeral neck with inferior displacement of the left humeral head relative to the glenoid and increased acromiohumeral interval. Vascular calcifications. No additional fracture.     Acute comminuted fracture of the left humeral neck with inferior dislocation of the humeral head relative to the glenoid. Electronically signed by Soy Andrew     _______________________________________________________________________    TOTAL TIME:  76 Minutes    Critical Care Provided     Minutes non procedure based    Signed: Eder Roger MD    Procedures: see electronic medical records for all procedures/Xrays and details which were not copied into this note but were reviewed prior to creation of Plan.

## 2024-07-12 LAB
ANION GAP SERPL CALC-SCNC: 5 MMOL/L (ref 5–15)
APPEARANCE UR: CLEAR
BACTERIA URNS QL MICRO: NEGATIVE /HPF
BASOPHILS # BLD: 0 K/UL (ref 0–0.1)
BASOPHILS NFR BLD: 0 % (ref 0–1)
BILIRUB UR QL: NEGATIVE
BUN SERPL-MCNC: 17 MG/DL (ref 6–20)
BUN/CREAT SERPL: 18 (ref 12–20)
CALCIUM SERPL-MCNC: 8.5 MG/DL (ref 8.5–10.1)
CHLORIDE SERPL-SCNC: 101 MMOL/L (ref 97–108)
CO2 SERPL-SCNC: 27 MMOL/L (ref 21–32)
COLOR UR: ABNORMAL
CREAT SERPL-MCNC: 0.94 MG/DL (ref 0.55–1.02)
DIFFERENTIAL METHOD BLD: ABNORMAL
EKG DIAGNOSIS: NORMAL
EKG Q-T INTERVAL: 470 MS
EKG QRS DURATION: 90 MS
EKG QTC CALCULATION (BAZETT): 441 MS
EKG R AXIS: -51 DEGREES
EKG T AXIS: -24 DEGREES
EKG VENTRICULAR RATE: 53 BPM
EOSINOPHIL # BLD: 0 K/UL (ref 0–0.4)
EOSINOPHIL NFR BLD: 0 % (ref 0–7)
EPITH CASTS URNS QL MICRO: ABNORMAL /LPF
ERYTHROCYTE [DISTWIDTH] IN BLOOD BY AUTOMATED COUNT: 13.2 % (ref 11.5–14.5)
GLUCOSE SERPL-MCNC: 216 MG/DL (ref 65–100)
GLUCOSE UR STRIP.AUTO-MCNC: NEGATIVE MG/DL
HCT VFR BLD AUTO: 33.1 % (ref 35–47)
HGB BLD-MCNC: 10.8 G/DL (ref 11.5–16)
HGB UR QL STRIP: NEGATIVE
HYALINE CASTS URNS QL MICRO: ABNORMAL /LPF (ref 0–5)
IMM GRANULOCYTES # BLD AUTO: 0 K/UL (ref 0–0.04)
IMM GRANULOCYTES NFR BLD AUTO: 0 % (ref 0–0.5)
KETONES UR QL STRIP.AUTO: NEGATIVE MG/DL
LEUKOCYTE ESTERASE UR QL STRIP.AUTO: ABNORMAL
LYMPHOCYTES # BLD: 1.6 K/UL (ref 0.8–3.5)
LYMPHOCYTES NFR BLD: 17 % (ref 12–49)
MAGNESIUM SERPL-MCNC: 2.1 MG/DL (ref 1.6–2.4)
MCH RBC QN AUTO: 28.1 PG (ref 26–34)
MCHC RBC AUTO-ENTMCNC: 32.6 G/DL (ref 30–36.5)
MCV RBC AUTO: 86.2 FL (ref 80–99)
MONOCYTES # BLD: 1 K/UL (ref 0–1)
MONOCYTES NFR BLD: 11 % (ref 5–13)
MUCOUS THREADS URNS QL MICRO: ABNORMAL /LPF
NEUTS SEG # BLD: 6.5 K/UL (ref 1.8–8)
NEUTS SEG NFR BLD: 72 % (ref 32–75)
NITRITE UR QL STRIP.AUTO: NEGATIVE
NRBC # BLD: 0 K/UL (ref 0–0.01)
NRBC BLD-RTO: 0 PER 100 WBC
PH UR STRIP: 6 (ref 5–8)
PHOSPHATE SERPL-MCNC: 3.8 MG/DL (ref 2.6–4.7)
PLATELET # BLD AUTO: 166 K/UL (ref 150–400)
PMV BLD AUTO: 10.2 FL (ref 8.9–12.9)
POTASSIUM SERPL-SCNC: 4.6 MMOL/L (ref 3.5–5.1)
PROT UR STRIP-MCNC: ABNORMAL MG/DL
RBC # BLD AUTO: 3.84 M/UL (ref 3.8–5.2)
RBC #/AREA URNS HPF: ABNORMAL /HPF (ref 0–5)
SODIUM SERPL-SCNC: 133 MMOL/L (ref 136–145)
SP GR UR REFRACTOMETRY: 1.02
TROPONIN I SERPL HS-MCNC: 10 NG/L (ref 0–51)
URINE CULTURE IF INDICATED: ABNORMAL
UROBILINOGEN UR QL STRIP.AUTO: 1 EU/DL (ref 0.2–1)
WBC # BLD AUTO: 9.2 K/UL (ref 3.6–11)
WBC URNS QL MICRO: ABNORMAL /HPF (ref 0–4)

## 2024-07-12 PROCEDURE — 84484 ASSAY OF TROPONIN QUANT: CPT

## 2024-07-12 PROCEDURE — 6360000002 HC RX W HCPCS: Performed by: STUDENT IN AN ORGANIZED HEALTH CARE EDUCATION/TRAINING PROGRAM

## 2024-07-12 PROCEDURE — 97535 SELF CARE MNGMENT TRAINING: CPT

## 2024-07-12 PROCEDURE — 97110 THERAPEUTIC EXERCISES: CPT

## 2024-07-12 PROCEDURE — 97530 THERAPEUTIC ACTIVITIES: CPT

## 2024-07-12 PROCEDURE — 2580000003 HC RX 258: Performed by: STUDENT IN AN ORGANIZED HEALTH CARE EDUCATION/TRAINING PROGRAM

## 2024-07-12 PROCEDURE — 36415 COLL VENOUS BLD VENIPUNCTURE: CPT

## 2024-07-12 PROCEDURE — 97162 PT EVAL MOD COMPLEX 30 MIN: CPT

## 2024-07-12 PROCEDURE — 97165 OT EVAL LOW COMPLEX 30 MIN: CPT

## 2024-07-12 PROCEDURE — 6370000000 HC RX 637 (ALT 250 FOR IP): Performed by: INTERNAL MEDICINE

## 2024-07-12 PROCEDURE — 2580000003 HC RX 258: Performed by: NURSE PRACTITIONER

## 2024-07-12 PROCEDURE — 1100000000 HC RM PRIVATE

## 2024-07-12 PROCEDURE — 83735 ASSAY OF MAGNESIUM: CPT

## 2024-07-12 PROCEDURE — 93010 ELECTROCARDIOGRAM REPORT: CPT | Performed by: SPECIALIST

## 2024-07-12 PROCEDURE — 80048 BASIC METABOLIC PNL TOTAL CA: CPT

## 2024-07-12 PROCEDURE — 85025 COMPLETE CBC W/AUTO DIFF WBC: CPT

## 2024-07-12 PROCEDURE — 6370000000 HC RX 637 (ALT 250 FOR IP): Performed by: STUDENT IN AN ORGANIZED HEALTH CARE EDUCATION/TRAINING PROGRAM

## 2024-07-12 PROCEDURE — 84100 ASSAY OF PHOSPHORUS: CPT

## 2024-07-12 PROCEDURE — 81001 URINALYSIS AUTO W/SCOPE: CPT

## 2024-07-12 PROCEDURE — 93005 ELECTROCARDIOGRAM TRACING: CPT | Performed by: NURSE PRACTITIONER

## 2024-07-12 RX ORDER — CALCIUM CARBONATE 500 MG/1
500 TABLET, CHEWABLE ORAL 3 TIMES DAILY PRN
Status: DISCONTINUED | OUTPATIENT
Start: 2024-07-12 | End: 2024-07-19 | Stop reason: HOSPADM

## 2024-07-12 RX ORDER — CYCLOBENZAPRINE HCL 10 MG
10 TABLET ORAL 3 TIMES DAILY PRN
Status: DISCONTINUED | OUTPATIENT
Start: 2024-07-12 | End: 2024-07-19 | Stop reason: HOSPADM

## 2024-07-12 RX ORDER — 0.9 % SODIUM CHLORIDE 0.9 %
500 INTRAVENOUS SOLUTION INTRAVENOUS ONCE
Status: COMPLETED | OUTPATIENT
Start: 2024-07-12 | End: 2024-07-12

## 2024-07-12 RX ADMIN — HYDROCODONE BITARTRATE AND ACETAMINOPHEN 1 TABLET: 10; 325 TABLET ORAL at 20:26

## 2024-07-12 RX ADMIN — LEVOTHYROXINE SODIUM 88 MCG: 88 TABLET ORAL at 05:34

## 2024-07-12 RX ADMIN — BRIMONIDINE TARTRATE 1 DROP: 2 SOLUTION OPHTHALMIC at 20:35

## 2024-07-12 RX ADMIN — ONDANSETRON 4 MG: 4 TABLET, ORALLY DISINTEGRATING ORAL at 17:35

## 2024-07-12 RX ADMIN — ENOXAPARIN SODIUM 60 MG: 100 INJECTION SUBCUTANEOUS at 09:09

## 2024-07-12 RX ADMIN — HYDROCODONE BITARTRATE AND ACETAMINOPHEN 1 TABLET: 10; 325 TABLET ORAL at 14:00

## 2024-07-12 RX ADMIN — LATANOPROST 1 DROP: 50 SOLUTION OPHTHALMIC at 20:35

## 2024-07-12 RX ADMIN — CALCIUM CARBONATE (ANTACID) CHEW TAB 500 MG 500 MG: 500 CHEW TAB at 17:02

## 2024-07-12 RX ADMIN — SODIUM CHLORIDE 500 ML: 9 INJECTION, SOLUTION INTRAVENOUS at 01:36

## 2024-07-12 RX ADMIN — SODIUM CHLORIDE, PRESERVATIVE FREE 10 ML: 5 INJECTION INTRAVENOUS at 20:28

## 2024-07-12 RX ADMIN — BRIMONIDINE TARTRATE 1 DROP: 2 SOLUTION OPHTHALMIC at 09:09

## 2024-07-12 RX ADMIN — CYCLOBENZAPRINE 10 MG: 10 TABLET, FILM COATED ORAL at 18:02

## 2024-07-12 RX ADMIN — SERTRALINE HYDROCHLORIDE 25 MG: 25 TABLET ORAL at 20:27

## 2024-07-12 RX ADMIN — ATORVASTATIN CALCIUM 40 MG: 40 TABLET, FILM COATED ORAL at 20:27

## 2024-07-12 RX ADMIN — ENOXAPARIN SODIUM 60 MG: 100 INJECTION SUBCUTANEOUS at 20:36

## 2024-07-12 ASSESSMENT — PAIN SCALES - GENERAL
PAINLEVEL_OUTOF10: 8
PAINLEVEL_OUTOF10: 5
PAINLEVEL_OUTOF10: 10
PAINLEVEL_OUTOF10: 2
PAINLEVEL_OUTOF10: 3
PAINLEVEL_OUTOF10: 6

## 2024-07-12 ASSESSMENT — PAIN DESCRIPTION - ORIENTATION
ORIENTATION: RIGHT
ORIENTATION: RIGHT
ORIENTATION: RIGHT;LEFT
ORIENTATION: LEFT

## 2024-07-12 ASSESSMENT — PAIN DESCRIPTION - DESCRIPTORS
DESCRIPTORS: ACHING
DESCRIPTORS: CRAMPING

## 2024-07-12 ASSESSMENT — PAIN DESCRIPTION - LOCATION
LOCATION: ARM;LEG
LOCATION: LEG
LOCATION: LEG
LOCATION: ANKLE

## 2024-07-12 NOTE — SIGNIFICANT EVENT
RAPID RESPONSE TEAM NOTE:    0107: Responded to overhead rapid response for hypotension. Patient is admitted with acute pubic ramus fractures as well as bilateral insufficiency fractures.    Assessment:  Upon arrival to bedside, patient is awake, alert, oriented, and following all commands. Patient denies lightheadedness/dizziness, only complaint is severe right leg cramp.    Vital signs as follows: 97.7, BP: 85/54 (65), RR: 20, SpO2: 99% on RA.    Interventions:  CHUCHO Heard at bedside, received orders for:    - 500mL NS bolus  - BMP, Mg, Phos, and Troponin levels  - EKG    Outcome:  Patient's BP up to 107/46 (66), leg cramp has resolved. Labs sent, EKG showing bradycardia in the upper 40s.     Patient to remain in room at this time    Please call with any questions or concerns  Mitzy Medley RN  RRT [phone]

## 2024-07-12 NOTE — PROGRESS NOTES
Occupational Therapy    Orders received, acknowledged, and patient chart reviewed up to date. Patient admitted s/p fall with imaging indicative of 1) acute left superior and inferior pubic ramus fractures, 2) subacute-chronic appearing bilateral sacral insufficiency fractures, 3) diffuse osteopenia, and 4) acute comminuted fracture of the left humeral neck with inferior dislocation of the humeral head relative to the glenoid. Also note rapid response yesterday. Ortho consult placed; remains pending. Will hold and follow-up for evaluation after ortho consult completed.     Martha Moss OTR/L

## 2024-07-12 NOTE — SIGNIFICANT EVENT
OVERNIGHT CROSS COVER:    Impression:   Hypotension  Bradycardia  Leg cramps/spasm  S/p mechanical fall resulting in left superior and inferior pubic ramus fracture and commuted fracture of the left humeral neck with inferior displacement of the left humeral head relative to the glenoid and increased acromiohumeral interval  PMHx HTN, HLD, PAF (on Eliquis), ascending aortic aneurysm, CAD, hypothyroidism, and GERD     HPI  89 y.o. female h/o  has a past medical history of Aortic root dilatation (HCC), Arthritis, CAD (coronary artery disease) (2001), Dyslipidemia, GERD (gastroesophageal reflux disease), HTN (hypertension), Hypothyroidism, and Thyroid disease.   admitted 7/11/2024 for Fracture [T14.8XXA]  Closed fracture of proximal end of left humerus, unspecified fracture morphology, initial encounter [S42.202A]  Closed fracture of sacrum, unspecified portion of sacrum, initial encounter ([facility]) [S32.10XA]  Closed fracture of multiple rami of left pubis, initial encounter ([facility]) [S32.592A].  See prior notes for complete details of course.       Alerted by overhead Rapid Response for hypotension.     Arrived to room to find pt with severe cramp/spam RLE.  Nurse reported patient had become progressively hypotensive.  Patient is alert and oriented, and most significant chief complaint is right leg cramps which she reports she experiences at home sometimes.  Patient noted to be in a bradycardic rhythm.  EKG demonstrates atrial fibrillation with slow ventricular response of 53 bpm.  There is evidence of left axis deviation.  Patient received IV fluid resuscitation with good response, as BP went from 80's to 120's.  She does denies any shortness of breath, with oxygen saturations in the high 90's on room air.  She denies any chest pain/chest tightness.  She denies any lightheadedness or dizziness, yet on further evaluation she reports that she does intermittently experience \"wooziness\" which sounds like it is

## 2024-07-12 NOTE — PLAN OF CARE
Problem: Physical Therapy - Adult  Goal: By Discharge: Performs mobility at highest level of function for planned discharge setting.  See evaluation for individualized goals.  Description: FUNCTIONAL STATUS PRIOR TO ADMISSION: Patient was independent and active without use of DME. The patient  was independent for basic and instrumental ADLs. Drives at baseline.    HOME SUPPORT PRIOR TO ADMISSION: The patient lived with son and DIL but did not require assistance. Patient is with her daughter mostly during the day, but sleeps at her son's house.    Physical Therapy Goals  Initiated 7/12/2024  1.  Patient will move from supine to sit and sit to supine, scoot up and down, and roll side to side in bed with contact guard assist within 7 day(s).    2.  Patient will perform sit to stand with contact guard assist within 7 day(s).  3.  Patient will transfer from bed to chair and chair to bed with contact guard assist using the least restrictive device within 7 day(s).  4.  Patient will ambulate with contact guard assist for 50 feet with the least restrictive device within 7 day(s).   5.  Patient will ascend/descend 3 stairs with 1 handrail(s) with minimal assistance within 7 day(s).   Outcome: Progressing   PHYSICAL THERAPY EVALUATION    Patient: Bebe Burnett (89 y.o. female)  Date: 7/12/2024  Primary Diagnosis: Fracture [T14.8XXA]  Closed fracture of proximal end of left humerus, unspecified fracture morphology, initial encounter [S42.202A]  Closed fracture of sacrum, unspecified portion of sacrum, initial encounter ([facility]) [S32.10XA]  Closed fracture of multiple rami of left pubis, initial encounter ([facility]) [S32.592A]       Precautions: Restrictions/Precautions: Weight Bearing, Fall Risk, Bed Alarm   Lower Extremity Weight Bearing Restrictions  Right Lower Extremity Weight Bearing: Weight Bearing As Tolerated  Left Lower Extremity Weight Bearing: Weight Bearing As Tolerated   Left Upper Extremity Weight Bearing: Non  relief    COMMUNICATION/EDUCATION:   The patient's plan of care was discussed with: physical therapist, occupational therapist, and registered nurse    Patient Education  Education Given To: Patient  Education Provided: Role of Therapy;Plan of Care;Equipment;Precautions;Transfer Training;Fall Prevention Strategies;Energy Conservation  Education Method: Demonstration;Verbal  Barriers to Learning: None  Education Outcome: Verbalized understanding;Continued education needed    Thank you for this referral.  Tara Moore, PT, DPT  Minutes: 54      Physical Therapy Evaluation Charge Determination   History Examination Presentation Decision-Making   HIGH Complexity :3+ comorbidities / personal factors will impact the outcome/ POC  HIGH Complexity : 4+ Standardized tests and measures addressing body structure, function, activity limitation and / or participation in recreation  MEDIUM Complexity : Evolving with changing characteristics  AM-PAC  HIGH    Based on the above components, the patient evaluation is determined to be of the following complexity level: Medium

## 2024-07-12 NOTE — CONSULTS
Orthopedic Consult    Subjective:     Bebe Burnett is a 89 y.o. female with past medical history significant for essential hypertension, hyperlipidemia, paroxysmal atrial fibrillation on Eliquis, ascending aortic aneurysm, coronary artery disease, GERD, hypothyroidism was brought to the hospital by family members after patient experienced fall.  Patient had a mechanical fall after tripping.  Ever since then patient has been experiencing pelvic pain (left groin), left shoulder pain, and inability to walk.     In the ER, vital signs within the normal limits.  Labs within the normal limits.      CT imaging showed acute left superior and inferior pubic ramus fractures.  Subacute to chronic appearing bilateral sacral insufficiency fractures. X-rays of the left shoulder revealed a displaced comminuted fracture of the left humeral neck.    Past Medical History:   Diagnosis Date    Aortic root dilatation (HCC)     mild    Arthritis     hands    CAD (coronary artery disease) 2001    started seeing Cardiologist 10 years ago for low pulse    Dyslipidemia     GERD (gastroesophageal reflux disease)     does not take anything other than Tums    HTN (hypertension)     Hypothyroidism     Thyroid disease       Past Surgical History:   Procedure Laterality Date    UPPER GI ENDOSCOPY,CTRL BLEED  2/17/2017         VASCULAR SURGERY  2015    aortic aneurysm repair     History reviewed. No pertinent family history.   Social History     Tobacco Use    Smoking status: Never    Smokeless tobacco: Never   Substance Use Topics    Alcohol use: No       Current Facility-Administered Medications   Medication Dose Route Frequency Provider Last Rate Last Admin    acetaminophen (TYLENOL) tablet 650 mg  650 mg Oral Q4H PRN Eder Roger MD        sodium chloride flush 0.9 % injection 5-40 mL  5-40 mL IntraVENous 2 times per day Eder Roger MD   10 mL at 07/11/24 2133    sodium chloride flush 0.9 % injection 5-40 mL  5-40 mL  IntraVENous PRN Eder Roger MD        0.9 % sodium chloride infusion   IntraVENous PRN Eder Roger MD        ondansetron (ZOFRAN-ODT) disintegrating tablet 4 mg  4 mg Oral Q8H PRN Eder Roger MD   4 mg at 07/11/24 2005    Or    ondansetron (ZOFRAN) injection 4 mg  4 mg IntraVENous Q6H PRN Eder Roger MD        acetaminophen (TYLENOL) tablet 650 mg  650 mg Oral Q6H PRN Eder Roger MD   650 mg at 07/11/24 2156    Or    acetaminophen (TYLENOL) suppository 650 mg  650 mg Rectal Q6H PRN Eder Roger MD        [Held by provider] amLODIPine (NORVASC) tablet 5 mg  5 mg Oral Daily Eder Roger MD        atorvastatin (LIPITOR) tablet 40 mg  40 mg Oral Nightly Eder Roger MD   40 mg at 07/11/24 2132    brimonidine (ALPHAGAN) 0.2 % ophthalmic solution 1 drop  1 drop Both Eyes BID Eder Roger MD   1 drop at 07/12/24 0909    [Held by provider] cloNIDine (CATAPRES) tablet 0.1 mg  0.1 mg Oral BID Eder Roger MD   0.1 mg at 07/11/24 2132    latanoprost (XALATAN) 0.005 % ophthalmic solution 1 drop  1 drop Both Eyes Nightly Eder Roger MD   1 drop at 07/11/24 2158    levothyroxine (SYNTHROID) tablet 88 mcg  88 mcg Oral Daily Eder Roger MD   88 mcg at 07/12/24 0534    sertraline (ZOLOFT) tablet 25 mg  25 mg Oral Nightly Eder Roger MD   25 mg at 07/11/24 2132    enoxaparin (LOVENOX) injection 60 mg  1 mg/kg SubCUTAneous BID Eder Roger MD   60 mg at 07/12/24 0909    polyethylene glycol (GLYCOLAX) packet 17 g  17 g Oral Daily Eder Roger MD        HYDROcodone-acetaminophen (NORCO)  MG per tablet 1 tablet  1 tablet Oral Q6H PRN Eder Roger MD   1 tablet at 07/12/24 1400    morphine (PF) injection 2 mg  2 mg IntraVENous Q4H PRN Eder Roger MD        [Held by provider] lisinopril (PRINIVIL;ZESTRIL) tablet 20 mg  20 mg Oral Daily Eder Roger MD        And    [Held by provider]

## 2024-07-12 NOTE — PROGRESS NOTES
Hospitalist Progress Note    NAME: Bebe Burnett   :  10/21/1934   MRN:  [id number]            Subjective:     Chief Complaint / Reason for Physician Visit Fall  Patient seen and evaluated at bedside, overnight events reviewed.  Discussed with RN events overnight.     Review of Systems:  Symptom Y/N Comments  Symptom Y/N Comments   Fever/Chills N   Chest Pain N    Poor Appetite Y   Edema N    Cough N   Abdominal Pain N    Sputum N   Joint Pain N    SOB/WYATT N   Pruritis/Rash N    Nausea/vomit N   Tolerating PT/OT NA    Diarrhea N   Tolerating Diet Y    Constipation N   Other       Could NOT obtain due to:      Objective:     VITALS:   Last 24hrs VS reviewed since prior progress note. Most recent are:  [unfilled]  No intake or output data in the 24 hours ending 24 1250     PHYSICAL EXAM:  General: Patient appears comfortable    EENT:  EOMI. Anicteric sclerae. MMM  Resp:  CTA bilaterally, no wheezing or rales.  No accessory muscle use  CV:  Regular  rhythm, s1/s2 no m/r/g No edema  GI:  Soft, Non distended, Non tender.  +Bowel sounds  Neurologic:  Alert and oriented X 3, normal speech,   Psych:   Good insight. Not anxious nor agitated  Skin:  No rashes.  No jaundice    Procedures: see electronic medical records for all procedures/Xrays and details which were not copied into this note but were reviewed prior to creation of Plan.      LABS:  I reviewed today's most current labs and imaging studies.  Pertinent labs include:  Recent Labs     24  0115   WBC 12.6* 9.2   HGB 12.0 10.8*   HCT 37.3 33.1*    166     Recent Labs     24  1820 24  0131    133*   K 3.8 4.6    101   CO2 30 27   BUN 13 17   MG  --  2.1   PHOS  --  3.8   ALT 18  --    INR 1.1  --        Signed: Shant Lora MD    Medical Decision Making:    Labs reviewed by myself   CBC/BMP/Magnesium    Diagnostic data reviewed by myself   EKG/Telemetry strip consistent with Normal sinus  rhythm    Toxic drug monitoring    Lovenox monitor for HIT, daily CBC    Discussed case with   Case Management in IDRs    MDM Discussion: Patient with numerous medical comorbidities, each with increased risk for mortality and morbidity if left untreated. Patient requires medications with high risk of toxicity and need  for intensive monitoring. I have reviewed patient's presenting subjective and objective findings, as well as all laboratory studies, imaging studies, and vital signs to date as well as treatment rendered and patient's response to those treatments. In addition, prior medical, surgical and relevant social and family histories were reviewed.     This is dictation was done by dragon, computer voice recognition software. Quite often unanticipated grammatical, syntax, homophones and other interpretive errors or inadvertently transcribed by the computer software. Please excuse errors that have escaped final proofreading. Thank you.      Reviewed most current lab test results and cultures  YES  Reviewed most current radiology test results   YES  Review and summation of old records today    NO  Reviewed patient's current orders and MAR    YES  PMH/SH reviewed - no change compared to H&P          Assessment / Plan:    Acute left superior and inferior pubic ramus fractures:  Subacute to chronic appearing bilateral sacral insufficiency fractures:  -Orthopedic surgery consulted.  -Pain medications ordered.  -Eliquis held.  -Full dose Lovenox instead of Eliquis.     Essential hypertension: continue to hold antihypertensive medications  Hyperlipidemia:  Paroxysmal atrial fibrillation:  Coronary artery disease:  GERD:  Hypothyroidism:  Ascending aortic aneurysm:  -Resume prior to admission medications lisinopril, hydrochlorothiazide, clonidine, Lipitor, Norvasc, Synthroid, Zoloft.  -Patient takes Eliquis 2.5 mg twice a day.  On hold.  Started Lovenox instead anticipating surgical procedure    18.5 - 24.9 Normal  weight / Body mass index is 21.87 kg/m².    Code status: Full code  Prophylaxis: Lovenox  Recommended Disposition:  TBD     ________________________________________________________________________  Care Plan discussed with:    Comments   Patient x    Family  x    RN x    Care Manager x    Consultant  x                     x Multidiciplinary team rounds were held today with , nursing, pharmacist and clinical coordinator.  Patient's plan of care was discussed; medications were reviewed and discharge planning was addressed.     ________________________________________________________________________  Total NON critical care TIME:  55   Minutes      Comments   >50% of visit spent in counseling and coordination of care x    ________________________________________________________________________  Shant Lora MD

## 2024-07-12 NOTE — PLAN OF CARE
coordination, balance, posture, fine-motor control, orthostatic hypotension, increased pain levels, and new weightbearing restrictions . Patient admitted s/p fall with imaging indicative of  1) acute left superior and inferior pubic ramus fractures, 2) subacute-chronic appearing bilateral sacral insufficiency fractures, 3) diffuse osteopenia, and 4) acute comminuted fracture of the left humeral neck. Per discussion with RN and Ortho written instructions, patient to be non-surgical with NWB LUE and WBAT BLEs.     This date, patient received resting in bed with LUE sling in place, readily amenable to session with minimal reports of pain. She maneuvers to EOB with assist for LE management and to achieve upright trunk, demonstrates good sitting balance. Patient mobilizes fairly well, taking steps to chair with min-mod assist x2 and B HHA. Patient with reports of significant nausea and lightheadedness following transfer, exhibiting decreased arousal; BP low and not recovering despite BLEs elevated and therapeutic exercise. Patient ultimately returned to bed for safety with up to max assist x2 and increased difficulty advancing LEs; similar incidence of decreased responsiveness immediately following transfer.  She recovers slowly in supine position with BLEs elevated; RN notified and in room to assess. At this time, patient far from baseline. Recommend IPR at discharge.         Position   BP   HR   Symptomatic    Comments   Semi-fowlers 111/52 58 []               Sitting EOB  139/77 55 [x]           Very mild lightheadedness   Sitting in chair, Les elevated  87/51 79 [x]           Significant nausea, lightheaded   Sitting in chair, Les elevated s/p LE exercise 83/59 55 [x]           Significant nausea, lightheaded   Supine in bed s/p transfer, Les elevated  112/59 45 [x]           Epigastric discomfort, posterior headache   Semi-fowlers, at rest 121/55 56 []           Improved coloring, arousal, etc      Functional  support;Poor  Standing - Dynamic: Poor;Constant support    ADL Assessment:     Feeding: Minimal assistance (infer for bimanual aspects)       Grooming: Minimal assistance (infer for bimanual aspects)       UE Bathing: Moderate assistance       LE Bathing: Maximum assistance       UE Dressing: Moderate assistance       LE Dressing: Maximum assistance       Toileting: Maximum assistance       Functional Mobility: Increased time to complete  Functional Mobility Skilled Clinical Factors: Right HHA    ADL Intervention and task modifications:    Upper-Body Dressing: Moderate assistance (for sling management, gown)    Lower-Body Dressing: Maximum assistance     Functional transfer training in prep for BSC toileting: significantly greater assist when returning to bed 2/2 orthostatic hypotension and not recovering in reclined position    Patient instructed in NWB LUE per ortho; wearing sling throughout session. Occasional verbal cues needed for compliance, especially during mobility.     Recommended pillow behind injured shoulder and scapula to prevent retraction and maintain neutral position. Educated pt to not use operative UE when performing bed mobility.    Therapeutic Exercise:    EXERCISE   Sets   Reps   Active Active Assist   Passive   Comments   Minimal gentle external rotation for repositioning and sling management   []                          []                          [x]                             Minimal gentle shoulder abduction for repositioning and sling management   []                          []                          [x]                             Elbow flexion/extension   []                          []                          []                             Wrist flexion/extension 1 10 [x]                          []                          []                          Arm supported in sling   Finger flexion/extension 1 10 [x]                          []                          []                           Arm supported in sling   Knee flexion/extension   2 10 []     [x]     []     When reclined in chair, symptomatic 2/2 orthostatic hypotension       [facility] \"6 Clicks\"                                                       Daily Activity Inpatient Short Form  How much help from another person does the patient currently need... Total; A Lot A Little None   1.  Putting on and taking off regular lower body clothing? []  1 [x]  2 []  3 []  4   2.  Bathing (including washing, rinsing, drying)? []  1 [x]  2 []  3 []  4   3.  Toileting, which includes using toilet, bedpan or urinal? [] 1 [x]  2 []  3 []  4   4.  Putting on and taking off regular upper body clothing? []  1 [x]  2 []  3 []  4   5.  Taking care of personal grooming such as brushing teeth? []  1 []  2 [x]  3 []  4   6.  Eating meals? []  1 []  2 [x]  3 []  4   © 2007, Trustees of [facility], under license to [employer]. All rights reserved     Score: 14/24     Interpretation of Tool:  Represents clinically-significant functional categories (i.e. Activities of daily living).    Cutoff score 39.4 (19) correlates to a good likelihood of discharging home versus a facility  Jovanna Guaman, Edith Ricci, Cj Perez, Ana Guaman, Jean Carlos Mendoza, Sonny Guaman, AM-PAC “6-Clicks” Functional Assessment Scores Predict Acute Care Hospital Discharge Destination, Physical Therapy, Volume 94, Issue 9, 1 September 2014, Pages 1173-2841, [email or web address]    Pain Rating:  Tolerates session well.     Pain Intervention(s):   patient medicated for pain prior to session, rest, elevation, and repositioning    Activity Tolerance:   Fair , requires rest breaks, and signs and symptoms of orthostatic hypotension    After treatment:   Patient left in no apparent distress in bed, Call bell within reach, Bed/ chair alarm activated, Side rails x3, Heels elevated for pressure relief, and LUE supported via  pricilla    COMMUNICATION/EDUCATION:   The patient's plan of care was discussed with: physical therapist and registered nurse    Patient Education  Education Given To: Patient  Education Provided: Role of Therapy;Energy Conservation;Fall Prevention Strategies;Plan of Care;Precautions;ADL Adaptive Strategies;Transfer Training  Education Method: Verbal;Demonstration  Barriers to Learning: None  Education Outcome: Verbalized understanding;Continued education needed    Thank you for this referral.  Martha Moss OT  Minutes: 48    Occupational Therapy Evaluation Charge Determination   History Examination Decision-Making   LOW Complexity : Brief history review  MEDIUM Complexity: 3-5 Performance deficits relating to physical, cognitive, or psychosocial skills that result in activity limitations and/or participation restrictions MEDIUM Complexity: Patient may present with comorbidities that affect occupational performance. Minimal to moderate modifications of tasks or assist (eg. physical or verbal) with assist is necessary to enable pt to complete eval   Based on the above components, the patient evaluation is determined to be of the following complexity level: Low

## 2024-07-12 NOTE — PROGRESS NOTES
Physical Therapy Note    Orders acknowledged. Chart reviewed. Patient admitted following GLF with multiple fxs noted. Patient on strict bedrest until ortho consult completed. Patient with rapid response overnight due to hypotension and bradycardia. Will hold PT evaluation at this time and follow back once ortho consult completed and patient is cleared for OOB mobility.    Thank you,  Tara Moore, PT, DPT

## 2024-07-12 NOTE — PROGRESS NOTES
Comprehensive Nutrition Assessment    Type and Reason for Visit:  Initial, Positive Nutrition Screen    Nutrition Recommendations/Plan:   Continue current diet  Ensure high protein BID     Malnutrition Assessment:  Malnutrition Status:  Insufficient data (07/12/24 1448)      Nutrition Assessment:    Patient medically noted for acute left superior and inferior pubic ramus fractures, bilateral sacral insufficiency fractures, and hypotension. PMH HLD, AFIB, CAD, GERD, HTN, hypothyroidism, and AAA. MST referral received. Patient sleeping soundly at time of attempted visit; no family at bedside. Unsure of accuracy of weights; no source currently documented and patient weighed 109# last month. No documented PO at this time. Ensure plus high protein ordered with meals. Patient with hyperglycemia but no hx of DM mentioned. Will adjust to Ensure high protein for now. Continue current diet. Encourage intake of meals and document %PO intake in flowsheets.     Weight History Weight       7/11/2024 123 lbs 7 oz   6/18/2024 109 lbs   2/27/2023 120 lbs     Nutrition Related Findings:    Na 133, -193-160   BM 7/11   Atorvastatin, Synthroid, Glycolax, Zoloft   Wound Type: None       Current Nutrition Intake & Therapies:          ADULT DIET; Regular; Low Sodium (2 gm)  ADULT ORAL NUTRITION SUPPLEMENT; Breakfast, Lunch, Dinner; Standard High Calorie/High Protein Oral Supplement    Anthropometric Measures:  Height: 160 cm (5' 3\")  Ideal Body Weight (IBW): 115 lbs (52 kg)       Current Body Weight: 56 kg (123 lb 7.3 oz),   IBW.    Current BMI (kg/m2): 21.9                          BMI Categories: Normal Weight (BMI 18.5-24.9)    Estimated Daily Nutrient Needs:  Energy Requirements Based On: Kcal/kg  Weight Used for Energy Requirements: Current  Energy (kcal/day): 1400 kcals (25 kcals/kg bw)  Weight Used for Protein Requirements: Current  Protein (g/day): 67g (1.2 g/kg bw)  Method Used for Fluid Requirements: 1 ml/kcal  Fluid

## 2024-07-12 NOTE — PROGRESS NOTES
End of Shift Note    Bedside shift change report given to   (oncoming nurse) by Junaid Sapp RN (offgoing nurse).  Report included the following information SBAR    Shift worked:  [id number]     Shift summary and any significant changes:     Pt BP dropped into the low 70s, Hr upper 40-50s, called Rapid; pt is stable; aox4 ra,   Has not voided   Concerns for physician to address:  C/o right leg cramp      Zone phone for oncoming shift:            Activity:     Number times ambulated in hallways past shift: 0  Number of times OOB to chair past shift: 0    Cardiac:   Cardiac Monitoring: Yes           Access:  Current line(s): PIV     Genitourinary:   Urinary status: due to void    Respiratory:      Chronic home O2 use?: NO  Incentive spirometer at bedside: NO       GI:     Current diet:  ADULT DIET; Regular; Low Sodium (2 gm)  ADULT ORAL NUTRITION SUPPLEMENT; Breakfast, Lunch, Dinner; Standard High Calorie/High Protein Oral Supplement  Passing flatus: YES  Tolerating current diet: YES       Pain Management:   Patient states pain is manageable on current regimen: YES    Skin:     Interventions: float heels, PT/OT consult, and internal/external urinary devices    Patient Safety:  Fall Score:    Interventions: bed/chair alarm, assistive device (walker, cane. etc), gripper socks, pt to call before getting OOB, and stay with me (per policy)       Length of Stay:  Expected LOS: 3  Actual LOS: 1      Junaid Sapp RN

## 2024-07-13 LAB
ANION GAP SERPL CALC-SCNC: 5 MMOL/L (ref 5–15)
BASOPHILS # BLD: 0 K/UL (ref 0–0.1)
BASOPHILS NFR BLD: 0 % (ref 0–1)
BUN SERPL-MCNC: 14 MG/DL (ref 6–20)
BUN/CREAT SERPL: 18 (ref 12–20)
CALCIUM SERPL-MCNC: 8.9 MG/DL (ref 8.5–10.1)
CHLORIDE SERPL-SCNC: 98 MMOL/L (ref 97–108)
CO2 SERPL-SCNC: 28 MMOL/L (ref 21–32)
CREAT SERPL-MCNC: 0.77 MG/DL (ref 0.55–1.02)
DIFFERENTIAL METHOD BLD: ABNORMAL
EOSINOPHIL # BLD: 0.2 K/UL (ref 0–0.4)
EOSINOPHIL NFR BLD: 2 % (ref 0–7)
ERYTHROCYTE [DISTWIDTH] IN BLOOD BY AUTOMATED COUNT: 13.3 % (ref 11.5–14.5)
GLUCOSE SERPL-MCNC: 180 MG/DL (ref 65–100)
HCT VFR BLD AUTO: 31.6 % (ref 35–47)
HGB BLD-MCNC: 10.2 G/DL (ref 11.5–16)
IMM GRANULOCYTES # BLD AUTO: 0.1 K/UL (ref 0–0.04)
IMM GRANULOCYTES NFR BLD AUTO: 1 % (ref 0–0.5)
LYMPHOCYTES # BLD: 1.4 K/UL (ref 0.8–3.5)
LYMPHOCYTES NFR BLD: 14 % (ref 12–49)
MCH RBC QN AUTO: 28 PG (ref 26–34)
MCHC RBC AUTO-ENTMCNC: 32.3 G/DL (ref 30–36.5)
MCV RBC AUTO: 86.8 FL (ref 80–99)
MONOCYTES # BLD: 1.1 K/UL (ref 0–1)
MONOCYTES NFR BLD: 11 % (ref 5–13)
NEUTS SEG # BLD: 7.3 K/UL (ref 1.8–8)
NEUTS SEG NFR BLD: 72 % (ref 32–75)
NRBC # BLD: 0 K/UL (ref 0–0.01)
NRBC BLD-RTO: 0 PER 100 WBC
PLATELET # BLD AUTO: 178 K/UL (ref 150–400)
PMV BLD AUTO: 10.5 FL (ref 8.9–12.9)
POTASSIUM SERPL-SCNC: 4.6 MMOL/L (ref 3.5–5.1)
RBC # BLD AUTO: 3.64 M/UL (ref 3.8–5.2)
SODIUM SERPL-SCNC: 129 MMOL/L (ref 136–145)
SODIUM SERPL-SCNC: 131 MMOL/L (ref 136–145)
SODIUM SERPL-SCNC: 131 MMOL/L (ref 136–145)
WBC # BLD AUTO: 10 K/UL (ref 3.6–11)

## 2024-07-13 PROCEDURE — 76937 US GUIDE VASCULAR ACCESS: CPT

## 2024-07-13 PROCEDURE — 2580000003 HC RX 258: Performed by: INTERNAL MEDICINE

## 2024-07-13 PROCEDURE — 6370000000 HC RX 637 (ALT 250 FOR IP): Performed by: STUDENT IN AN ORGANIZED HEALTH CARE EDUCATION/TRAINING PROGRAM

## 2024-07-13 PROCEDURE — 84295 ASSAY OF SERUM SODIUM: CPT

## 2024-07-13 PROCEDURE — 6360000002 HC RX W HCPCS: Performed by: STUDENT IN AN ORGANIZED HEALTH CARE EDUCATION/TRAINING PROGRAM

## 2024-07-13 PROCEDURE — 36415 COLL VENOUS BLD VENIPUNCTURE: CPT

## 2024-07-13 PROCEDURE — 2580000003 HC RX 258: Performed by: STUDENT IN AN ORGANIZED HEALTH CARE EDUCATION/TRAINING PROGRAM

## 2024-07-13 PROCEDURE — 2060000000 HC ICU INTERMEDIATE R&B

## 2024-07-13 PROCEDURE — 6370000000 HC RX 637 (ALT 250 FOR IP): Performed by: INTERNAL MEDICINE

## 2024-07-13 PROCEDURE — 83930 ASSAY OF BLOOD OSMOLALITY: CPT

## 2024-07-13 PROCEDURE — 80048 BASIC METABOLIC PNL TOTAL CA: CPT

## 2024-07-13 PROCEDURE — 85025 COMPLETE CBC W/AUTO DIFF WBC: CPT

## 2024-07-13 RX ORDER — SODIUM CHLORIDE 9 MG/ML
INJECTION, SOLUTION INTRAVENOUS CONTINUOUS
Status: DISCONTINUED | OUTPATIENT
Start: 2024-07-13 | End: 2024-07-14

## 2024-07-13 RX ORDER — LIDOCAINE 4 G/G
2 PATCH TOPICAL DAILY
Status: DISCONTINUED | OUTPATIENT
Start: 2024-07-13 | End: 2024-07-19 | Stop reason: HOSPADM

## 2024-07-13 RX ADMIN — BRIMONIDINE TARTRATE 1 DROP: 2 SOLUTION OPHTHALMIC at 10:00

## 2024-07-13 RX ADMIN — ATORVASTATIN CALCIUM 40 MG: 40 TABLET, FILM COATED ORAL at 20:59

## 2024-07-13 RX ADMIN — HYDROCODONE BITARTRATE AND ACETAMINOPHEN 1 TABLET: 10; 325 TABLET ORAL at 18:57

## 2024-07-13 RX ADMIN — SODIUM CHLORIDE, PRESERVATIVE FREE 10 ML: 5 INJECTION INTRAVENOUS at 09:50

## 2024-07-13 RX ADMIN — POLYETHYLENE GLYCOL 3350 17 G: 17 POWDER, FOR SOLUTION ORAL at 09:49

## 2024-07-13 RX ADMIN — SODIUM CHLORIDE: 9 INJECTION, SOLUTION INTRAVENOUS at 19:17

## 2024-07-13 RX ADMIN — HYDROCODONE BITARTRATE AND ACETAMINOPHEN 1 TABLET: 10; 325 TABLET ORAL at 09:49

## 2024-07-13 RX ADMIN — BRIMONIDINE TARTRATE 1 DROP: 2 SOLUTION OPHTHALMIC at 21:01

## 2024-07-13 RX ADMIN — ENOXAPARIN SODIUM 60 MG: 100 INJECTION SUBCUTANEOUS at 20:59

## 2024-07-13 RX ADMIN — SERTRALINE HYDROCHLORIDE 25 MG: 25 TABLET ORAL at 20:59

## 2024-07-13 RX ADMIN — SODIUM CHLORIDE: 9 INJECTION, SOLUTION INTRAVENOUS at 11:38

## 2024-07-13 RX ADMIN — ENOXAPARIN SODIUM 60 MG: 100 INJECTION SUBCUTANEOUS at 09:49

## 2024-07-13 RX ADMIN — LEVOTHYROXINE SODIUM 88 MCG: 88 TABLET ORAL at 05:32

## 2024-07-13 ASSESSMENT — PAIN SCALES - GENERAL
PAINLEVEL_OUTOF10: 5
PAINLEVEL_OUTOF10: 6
PAINLEVEL_OUTOF10: 6
PAINLEVEL_OUTOF10: 0
PAINLEVEL_OUTOF10: 0

## 2024-07-13 ASSESSMENT — PAIN DESCRIPTION - ORIENTATION
ORIENTATION: LEFT

## 2024-07-13 ASSESSMENT — PAIN DESCRIPTION - LOCATION
LOCATION: ARM

## 2024-07-13 ASSESSMENT — PAIN DESCRIPTION - PAIN TYPE: TYPE: ACUTE PAIN

## 2024-07-13 ASSESSMENT — PAIN DESCRIPTION - DESCRIPTORS
DESCRIPTORS: ACHING

## 2024-07-13 NOTE — PROGRESS NOTES
Hospitalist Progress Note    NAME: Bebe Burnett   :  10/21/1934   MRN:  [id number]            Subjective:     Chief Complaint / Reason for Physician Visit Fall  Patient seen and evaluated at bedside, overnight events reviewed.  Discussed with RN events overnight.     Review of Systems:  Symptom Y/N Comments  Symptom Y/N Comments   Fever/Chills N   Chest Pain N    Poor Appetite Y   Edema N    Cough N   Abdominal Pain N    Sputum N   Joint Pain N    SOB/WYATT N   Pruritis/Rash N    Nausea/vomit N   Tolerating PT/OT NA    Diarrhea N   Tolerating Diet Y    Constipation N   Other       Could NOT obtain due to:      Objective:     VITALS:   Last 24hrs VS reviewed since prior progress note. Most recent are:  [unfilled]    Intake/Output Summary (Last 24 hours) at 2024 1227  Last data filed at 2024 0349  Gross per 24 hour   Intake 120 ml   Output --   Net 120 ml        PHYSICAL EXAM:  General: Patient appears comfortable    EENT:  EOMI. Anicteric sclerae. MMM  Resp:  CTA bilaterally, no wheezing or rales.  No accessory muscle use  CV:  Regular  rhythm, s1/s2 no m/r/g No edema  GI:  Soft, Non distended, Non tender.  +Bowel sounds  Neurologic:  Alert and oriented X 3, normal speech,   Psych:   Good insight. Not anxious nor agitated  Skin:  No rashes.  No jaundice    Procedures: see electronic medical records for all procedures/Xrays and details which were not copied into this note but were reviewed prior to creation of Plan.      LABS:  I reviewed today's most current labs and imaging studies.  Pertinent labs include:  Recent Labs     24  1820 24  0115 24  0850   WBC 12.6* 9.2 10.0   HGB 12.0 10.8* 10.2*   HCT 37.3 33.1* 31.6*    166 178       Recent Labs     24  1820 24  0131 24  0850    133* 131*   K 3.8 4.6 4.6    101 98   CO2 30 27 28   BUN 13 17 14   MG  --  2.1  --    PHOS  --  3.8  --    ALT 18  --   --    INR 1.1  --   --          Signed: Shant Bautista  Falguni Lora MD    Medical Decision Making:    Labs reviewed by myself   CBC/BMP/Magnesium    Diagnostic data reviewed by myself   EKG/Telemetry strip consistent with Normal sinus rhythm    Toxic drug monitoring    Lovenox monitor for HIT, daily CBC    Discussed case with   Case Management in IDRs    MDM Discussion: Patient with numerous medical comorbidities, each with increased risk for mortality and morbidity if left untreated. Patient requires medications with high risk of toxicity and need  for intensive monitoring. I have reviewed patient's presenting subjective and objective findings, as well as all laboratory studies, imaging studies, and vital signs to date as well as treatment rendered and patient's response to those treatments. In addition, prior medical, surgical and relevant social and family histories were reviewed.     This is dictation was done by dragon, computer voice recognition software. Quite often unanticipated grammatical, syntax, homophones and other interpretive errors or inadvertently transcribed by the computer software. Please excuse errors that have escaped final proofreading. Thank you.      Reviewed most current lab test results and cultures  YES  Reviewed most current radiology test results   YES  Review and summation of old records today    NO  Reviewed patient's current orders and MAR    YES  PMH/SH reviewed - no change compared to H&P          Assessment / Plan:    Acute left superior and inferior pubic ramus fractures:  Subacute to chronic appearing bilateral sacral insufficiency fractures:  -Orthopedic surgery consulted.  -Pain medications ordered.  -Eliquis held.  -Full dose Lovenox instead of Eliquis.    Bradycardia - of note patient had episodes of bradycardia due to unclear etiology  Continuous telemetry monitoring  Avoid AV nakul blocking agents  Carrdiology consult for further evaluation     Essential hypertension: continue to hold antihypertensive  medications  Hyperlipidemia:  Paroxysmal atrial fibrillation:  Coronary artery disease:  GERD:  Hypothyroidism:  Ascending aortic aneurysm:  -Resume prior to admission medications lisinopril, hydrochlorothiazide, clonidine, Lipitor, Norvasc, Synthroid, Zoloft.  -Continue Lovenox, transition to oral AC when ok by Cardiology    18.5 - 24.9 Normal weight / Body mass index is 21.87 kg/m².    Code status: Full code  Prophylaxis: Lovenox  Recommended Disposition:  TBD     ________________________________________________________________________  Care Plan discussed with:    Comments   Patient x    Family  x    RN x    Care Manager x    Consultant  x                     x Multidiciplinary team rounds were held today with , nursing, pharmacist and clinical coordinator.  Patient's plan of care was discussed; medications were reviewed and discharge planning was addressed.     ________________________________________________________________________  Total NON critical care TIME:  55   Minutes      Comments   >50% of visit spent in counseling and coordination of care x    ________________________________________________________________________  Shant Lora MD

## 2024-07-13 NOTE — PROGRESS NOTES
Physical Therapy    Arrived to unit to see pt for therapy. Pt in process of being transferred to [facility] due to bradycardia. Will defer and follow.    Chantell Hammond, PT

## 2024-07-13 NOTE — CONSULTS
Consultation Note    NAME: Bebe Burnett   :  10/21/1934   MRN:  [id number]     Date/Time:  2024 12:00 PM    I have been asked to see this patient by Dr. Lora  for advice/opinion re: Hyponatremia.     Assessment :    Plan:  Hyponatremia, chronic  HTN --> now hypotension  DM2  Afib  S/p fall  Pubic fx/left humerus fx Sodium 131; continue on NS at 75, trend sodium; fluid restrict    Stay off thiazide from now on    BP meds on hold       Subjective:   CHIEF COMPLAINT:  hyponatremia    HISTORY OF PRESENT ILLNESS:     [place] is a 89 y.o.   female who has a history of the below. Family at bedside. Pain on movement. No sob. No edema. Chart reviewed. Family at bedside.    Past Medical History:   Diagnosis Date    Aortic root dilatation (HCC)     mild    Arthritis     hands    CAD (coronary artery disease)     started seeing Cardiologist 10 years ago for low pulse    Dyslipidemia     GERD (gastroesophageal reflux disease)     does not take anything other than Tums    HTN (hypertension)     Hypothyroidism     Thyroid disease       Past Surgical History:   Procedure Laterality Date    UPPER GI ENDOSCOPY,CTRL BLEED  2017         VASCULAR SURGERY  2015    aortic aneurysm repair     Social History     Tobacco Use    Smoking status: Never    Smokeless tobacco: Never   Substance Use Topics    Alcohol use: No      History reviewed. No pertinent family history.   Allergies   Allergen Reactions    Ivjar-Lowve-Nhtygza-Pramoxine Other (See Comments)     Unknown antibiotic begins with a \"C\"      Prior to Admission medications    Medication Sig Start Date End Date Taking? Authorizing Provider   brimonidine (ALPHAGAN) 0.2 % ophthalmic solution 1 drop  in the morning and 1 drop in the evening. Instill into affected eye(s). 24   Provider, MD Chastity   latanoprost (XALATAN) 0.005 % ophthalmic solution 1 drop daily Instill into affected eye(s)    Provider, MD Chastity   lisinopril-hydroCHLOROthiazide

## 2024-07-13 NOTE — CONSULTS
EP/ ARRHYTHMIA/ CARDIOLOGY CONSULT requested secondary to preoperative cardiac evaluation    Patient ID:  Patient: Bebe Burnett  MRN: [id number]  Age: 89 y.o.  : 10/21/1934    Date of  Admission: 2024  2:54 PM   PCP:  Patricia Hoffmann APRN - [facility]  Usual cardiologist:  Dionte Torrez MD    Assessment:   Paroxsymal atrial fibrillation, currently in sinus rhythm/sinus bradycardia.  First degree AV block.  Sinus with blocked PAC's on admission ECG.  Doesn't need rate control prophylaxis for atrial fibrillation.  No unstable arrhythmias.  No evidence of unstable coronary issue.  No ischemia or infarct on nuclear test in 2021.  No history of CHF or evidence of decompensated CHF.  Echo EF 55-60%, mild LVH, mild AR/MR/TR.  No preload dependent cardiac disease.  Hypotension likely secondary to pain medication.  Thoracic aortic aneurysm without rupture.  PVD.  Full code.    Plan:     I feel the risk is acceptable to proceed to orthopedic surgery.  Eliquis is being held.  She met criteria for reduced dosing based on age, weight.  BP meds are being held.  Agree with IVF, she'll tolerate this.    All questions answered for the patient and family.  Patient is OK with [facility] [place] [phone] getting cardiac updates if needed.     [x]       High complexity decision making was performed in this patient at high risk for decompensation    Bebe Burnett is a 89 y.o. female with a history of a mechanical ground level fall found to have L shoulder dislocation and pelvic fractures.  Anticipated L shoulder surgery with Dr. Mathis.  She denies chest, jaw, neck, shoulder, or arm pain with exertion.  No CHF symptoms.  No TIA or stroke symptoms.    She is getting pain medication here, will nod off occasionally during our interview.  Getting IVF due to mild hypotension.      Past Medical History:   Diagnosis Date    Aortic root dilatation (HCC)     mild    Arthritis     hands    CAD  95/57   07/13/24 0711 108/62        Intake/Output Summary (Last 24 hours) at 7/13/2024 1223  Last data filed at 7/13/2024 0349  Gross per 24 hour   Intake 120 ml   Output --   Net 120 ml       Nondiaphoretic, not in acute distress, slender elderly female.  Supple, no palpable or tender thyromegaly.  No scleral icterus, mucous membranes moist, conjuctivae pink, no xanthelasma.  Unlabored, clear to auscultation bilaterally, symmetric air movement.  Regular rate and rhythm, + early-peaking systolic flow murmur, no pericardial rub, knock, or gallop.  No JVD or peripheral edema.  No carotid bruit.  Palpable radial pulses bilaterally.  Abdomen, soft, nontender, nondistended.  No abdominal bruit or pulstatile masses.  Extremities without cyanosis or clubbing.  Muscle tone and bulk normal for age.  Skin warm and dry.  No rashes or ulcers.  Neuro grossly nonfocal.  No tremor.  Awake and appropriate.    CARDIOGRAPHICS and STUDIES, I reviewed:    Telemetry:  SINUS RHYTHM 70's with first degree AV block.    ECG:  SINUS RHYTHM with probable blocked PAC's, first degree AV block otherwise.       Labs:  No results for input(s): \"CPK\", \"CKMB\" in the last 72 hours.    Invalid input(s): \"CPKMB\", \"CKNDX\", \"TROIQ\"  No results found for: \"CHOL\", \"CHLST\", \"CHOLV\", \"HDL\", \"HDLC\", \"LDL\"  Recent Labs     07/11/24  1820   INR 1.1      Recent Labs     07/11/24  1820 07/12/24  0115 07/12/24  0131 07/13/24  0850     --  133* 131*   K 3.8  --  4.6 4.6     --  101 98   CO2 30  --  27 28   BUN 13  --  17 14   PHOS  --   --  3.8  --    WBC 12.6* 9.2  --  10.0   HGB 12.0 10.8*  --  10.2*   HCT 37.3 33.1*  --  31.6*    166  --  178     Estimated Creatinine Clearance: 41 mL/min (based on SCr of 0.77 mg/dL).    Recent Labs     07/11/24  1820   GLOB 3.2     No components found for: \"GLPOC\"  No results for input(s): \"PH\", \"PCO2\", \"PO2\" in the last 72 hours.      Giorgi Pyle MD 07/13/24 12:23 PM

## 2024-07-14 LAB
ANION GAP SERPL CALC-SCNC: 2 MMOL/L (ref 5–15)
BASOPHILS # BLD: 0 K/UL (ref 0–0.1)
BASOPHILS NFR BLD: 0 % (ref 0–1)
BUN SERPL-MCNC: 18 MG/DL (ref 6–20)
BUN/CREAT SERPL: 24 (ref 12–20)
CALCIUM SERPL-MCNC: 8.5 MG/DL (ref 8.5–10.1)
CHLORIDE SERPL-SCNC: 99 MMOL/L (ref 97–108)
CO2 SERPL-SCNC: 29 MMOL/L (ref 21–32)
CREAT SERPL-MCNC: 0.76 MG/DL (ref 0.55–1.02)
DIFFERENTIAL METHOD BLD: ABNORMAL
EOSINOPHIL # BLD: 0.2 K/UL (ref 0–0.4)
EOSINOPHIL NFR BLD: 2 % (ref 0–7)
ERYTHROCYTE [DISTWIDTH] IN BLOOD BY AUTOMATED COUNT: 13.3 % (ref 11.5–14.5)
GLUCOSE SERPL-MCNC: 142 MG/DL (ref 65–100)
HCT VFR BLD AUTO: 26 % (ref 35–47)
HGB BLD-MCNC: 8.4 G/DL (ref 11.5–16)
IMM GRANULOCYTES # BLD AUTO: 0.1 K/UL (ref 0–0.04)
IMM GRANULOCYTES NFR BLD AUTO: 1 % (ref 0–0.5)
LYMPHOCYTES # BLD: 1.6 K/UL (ref 0.8–3.5)
LYMPHOCYTES NFR BLD: 20 % (ref 12–49)
MCH RBC QN AUTO: 28.1 PG (ref 26–34)
MCHC RBC AUTO-ENTMCNC: 32.3 G/DL (ref 30–36.5)
MCV RBC AUTO: 87 FL (ref 80–99)
MONOCYTES # BLD: 1 K/UL (ref 0–1)
MONOCYTES NFR BLD: 12 % (ref 5–13)
NEUTS SEG # BLD: 5.1 K/UL (ref 1.8–8)
NEUTS SEG NFR BLD: 65 % (ref 32–75)
NRBC # BLD: 0 K/UL (ref 0–0.01)
NRBC BLD-RTO: 0 PER 100 WBC
OSMOLALITY SERPL: 277 MOSM/KG H2O
PLATELET # BLD AUTO: 131 K/UL (ref 150–400)
PMV BLD AUTO: 10.1 FL (ref 8.9–12.9)
POTASSIUM SERPL-SCNC: 4.8 MMOL/L (ref 3.5–5.1)
RBC # BLD AUTO: 2.99 M/UL (ref 3.8–5.2)
SODIUM SERPL-SCNC: 130 MMOL/L (ref 136–145)
SODIUM SERPL-SCNC: 131 MMOL/L (ref 136–145)
WBC # BLD AUTO: 7.9 K/UL (ref 3.6–11)

## 2024-07-14 PROCEDURE — 97530 THERAPEUTIC ACTIVITIES: CPT

## 2024-07-14 PROCEDURE — 85025 COMPLETE CBC W/AUTO DIFF WBC: CPT

## 2024-07-14 PROCEDURE — 6370000000 HC RX 637 (ALT 250 FOR IP): Performed by: STUDENT IN AN ORGANIZED HEALTH CARE EDUCATION/TRAINING PROGRAM

## 2024-07-14 PROCEDURE — 2580000003 HC RX 258: Performed by: STUDENT IN AN ORGANIZED HEALTH CARE EDUCATION/TRAINING PROGRAM

## 2024-07-14 PROCEDURE — 6360000002 HC RX W HCPCS: Performed by: STUDENT IN AN ORGANIZED HEALTH CARE EDUCATION/TRAINING PROGRAM

## 2024-07-14 PROCEDURE — 6370000000 HC RX 637 (ALT 250 FOR IP): Performed by: INTERNAL MEDICINE

## 2024-07-14 PROCEDURE — 1100000003 HC PRIVATE W/ TELEMETRY

## 2024-07-14 PROCEDURE — 80048 BASIC METABOLIC PNL TOTAL CA: CPT

## 2024-07-14 PROCEDURE — 36415 COLL VENOUS BLD VENIPUNCTURE: CPT

## 2024-07-14 PROCEDURE — 84295 ASSAY OF SERUM SODIUM: CPT

## 2024-07-14 RX ADMIN — LEVOTHYROXINE SODIUM 88 MCG: 88 TABLET ORAL at 05:03

## 2024-07-14 RX ADMIN — ENOXAPARIN SODIUM 60 MG: 100 INJECTION SUBCUTANEOUS at 21:14

## 2024-07-14 RX ADMIN — SODIUM CHLORIDE, PRESERVATIVE FREE 10 ML: 5 INJECTION INTRAVENOUS at 21:15

## 2024-07-14 RX ADMIN — POLYETHYLENE GLYCOL 3350 17 G: 17 POWDER, FOR SOLUTION ORAL at 08:42

## 2024-07-14 RX ADMIN — SERTRALINE HYDROCHLORIDE 25 MG: 25 TABLET ORAL at 21:12

## 2024-07-14 RX ADMIN — HYDROCODONE BITARTRATE AND ACETAMINOPHEN 1 TABLET: 10; 325 TABLET ORAL at 01:14

## 2024-07-14 RX ADMIN — HYDROCODONE BITARTRATE AND ACETAMINOPHEN 1 TABLET: 10; 325 TABLET ORAL at 08:42

## 2024-07-14 RX ADMIN — BRIMONIDINE TARTRATE 1 DROP: 2 SOLUTION OPHTHALMIC at 10:32

## 2024-07-14 RX ADMIN — HYDROCODONE BITARTRATE AND ACETAMINOPHEN 1 TABLET: 10; 325 TABLET ORAL at 17:22

## 2024-07-14 RX ADMIN — SODIUM CHLORIDE, PRESERVATIVE FREE 10 ML: 5 INJECTION INTRAVENOUS at 08:48

## 2024-07-14 RX ADMIN — CYCLOBENZAPRINE 10 MG: 10 TABLET, FILM COATED ORAL at 21:12

## 2024-07-14 RX ADMIN — ATORVASTATIN CALCIUM 40 MG: 40 TABLET, FILM COATED ORAL at 21:12

## 2024-07-14 RX ADMIN — BRIMONIDINE TARTRATE 1 DROP: 2 SOLUTION OPHTHALMIC at 21:19

## 2024-07-14 RX ADMIN — LATANOPROST 1 DROP: 50 SOLUTION OPHTHALMIC at 21:13

## 2024-07-14 RX ADMIN — ENOXAPARIN SODIUM 60 MG: 100 INJECTION SUBCUTANEOUS at 08:48

## 2024-07-14 ASSESSMENT — PAIN SCALES - WONG BAKER
WONGBAKER_NUMERICALRESPONSE: NO HURT

## 2024-07-14 ASSESSMENT — PAIN DESCRIPTION - FREQUENCY
FREQUENCY: CONTINUOUS
FREQUENCY: INTERMITTENT

## 2024-07-14 ASSESSMENT — PAIN DESCRIPTION - ORIENTATION
ORIENTATION: LEFT
ORIENTATION: LEFT;POSTERIOR
ORIENTATION: LEFT

## 2024-07-14 ASSESSMENT — PAIN SCALES - GENERAL
PAINLEVEL_OUTOF10: 5
PAINLEVEL_OUTOF10: 0
PAINLEVEL_OUTOF10: 3
PAINLEVEL_OUTOF10: 0
PAINLEVEL_OUTOF10: 6
PAINLEVEL_OUTOF10: 8
PAINLEVEL_OUTOF10: 0
PAINLEVEL_OUTOF10: 0

## 2024-07-14 ASSESSMENT — PAIN DESCRIPTION - ONSET
ONSET: ON-GOING
ONSET: ON-GOING

## 2024-07-14 ASSESSMENT — PAIN DESCRIPTION - DESCRIPTORS
DESCRIPTORS: CRAMPING;ACHING
DESCRIPTORS: ACHING;SHARP
DESCRIPTORS: ACHING;SORE
DESCRIPTORS: ACHING;TIGHTNESS;TENDER

## 2024-07-14 ASSESSMENT — PAIN DESCRIPTION - PAIN TYPE: TYPE: ACUTE PAIN

## 2024-07-14 ASSESSMENT — PAIN DESCRIPTION - LOCATION
LOCATION: LEG
LOCATION: BACK;NECK
LOCATION: HIP
LOCATION: ELBOW;ARM

## 2024-07-14 ASSESSMENT — PAIN - FUNCTIONAL ASSESSMENT
PAIN_FUNCTIONAL_ASSESSMENT: PREVENTS OR INTERFERES SOME ACTIVE ACTIVITIES AND ADLS
PAIN_FUNCTIONAL_ASSESSMENT: PREVENTS OR INTERFERES WITH ALL ACTIVE AND SOME PASSIVE ACTIVITIES

## 2024-07-14 NOTE — PLAN OF CARE
Problem: Pain  Goal: Verbalizes/displays adequate comfort level or baseline comfort level  Recent Flowsheet Documentation  Taken 7/14/2024 0842 by Deloris Weathers RN  Verbalizes/displays adequate comfort level or baseline comfort level:   Encourage patient to monitor pain and request assistance   Assess pain using appropriate pain scale     Problem: ABCDS Injury Assessment  Goal: Absence of physical injury  Recent Flowsheet Documentation  Taken 7/14/2024 1319 by Holli Burgos RN  Absence of Physical Injury: Implement safety measures based on patient assessment     Problem: Safety - Adult  Goal: Free from fall injury  Recent Flowsheet Documentation  Taken 7/14/2024 1319 by Holli Burgos RN  Free From Fall Injury:   Instruct family/caregiver on patient safety   Based on caregiver fall risk screen, instruct family/caregiver to ask for assistance with transferring infant if caregiver noted to have fall risk factors     Problem: Discharge Planning  Goal: Discharge to home or other facility with appropriate resources  Recent Flowsheet Documentation  Taken 7/14/2024 0900 by Holli Burgos RN  Discharge to home or other facility with appropriate resources: Identify barriers to discharge with patient and caregiver

## 2024-07-14 NOTE — PROGRESS NOTES
Hospitalist Progress Note    NAME: Bebe Burnett   :  10/21/1934   MRN:  [id number]            Subjective:     Chief Complaint / Reason for Physician Visit Fall  Patient seen and evaluated at bedside, overnight events reviewed.  Discussed with RN events overnight.     Review of Systems:  Symptom Y/N Comments  Symptom Y/N Comments   Fever/Chills N   Chest Pain N    Poor Appetite Y   Edema N    Cough N   Abdominal Pain N    Sputum N   Joint Pain N    SOB/WYATT N   Pruritis/Rash N    Nausea/vomit N   Tolerating PT/OT NA    Diarrhea N   Tolerating Diet Y    Constipation N   Other       Could NOT obtain due to:      Objective:     VITALS:   Last 24hrs VS reviewed since prior progress note. Most recent are:  [unfilled]    Intake/Output Summary (Last 24 hours) at 2024 1242  Last data filed at 2024 0848  Gross per 24 hour   Intake 358.31 ml   Output 550 ml   Net -191.69 ml          PHYSICAL EXAM:  General: Patient appears comfortable    EENT:  EOMI. Anicteric sclerae. MMM  Resp:  CTA bilaterally, no wheezing or rales.  No accessory muscle use  CV:  Regular  rhythm, s1/s2 no m/r/g No edema  GI:  Soft, Non distended, Non tender.  +Bowel sounds  Neurologic:  Alert and oriented X 3, normal speech,   Psych:   Good insight. Not anxious nor agitated  Skin:  No rashes.  No jaundice    Procedures: see electronic medical records for all procedures/Xrays and details which were not copied into this note but were reviewed prior to creation of Plan.      LABS:  I reviewed today's most current labs and imaging studies.  Pertinent labs include:  Recent Labs     24  0115 24  0850 24  0509   WBC 9.2 10.0 7.9   HGB 10.8* 10.2* 8.4*   HCT 33.1* 31.6* 26.0*    178 131*       Recent Labs     24  1820 24  0131 24  0850 24  1549 24  2250 24  0509    133* 131* 131* 129* 130*   K 3.8 4.6 4.6  --   --  4.8    101 98  --   --  99   CO2 30 27 28  --   --  29   BUN 13 17  14  --   --  18   MG  --  2.1  --   --   --   --    PHOS  --  3.8  --   --   --   --    ALT 18  --   --   --   --   --    INR 1.1  --   --   --   --   --          Signed: Shant Lora MD    Medical Decision Making:    Labs reviewed by myself   CBC/BMP/Magnesium    Diagnostic data reviewed by myself   EKG/Telemetry strip consistent with Normal sinus rhythm    Toxic drug monitoring    Lovenox monitor for HIT, daily CBC    Discussed case with   Case Management in IDRs    MDM Discussion: Patient with numerous medical comorbidities, each with increased risk for mortality and morbidity if left untreated. Patient requires medications with high risk of toxicity and need  for intensive monitoring. I have reviewed patient's presenting subjective and objective findings, as well as all laboratory studies, imaging studies, and vital signs to date as well as treatment rendered and patient's response to those treatments. In addition, prior medical, surgical and relevant social and family histories were reviewed.     This is dictation was done by dragon, computer voice recognition software. Quite often unanticipated grammatical, syntax, homophones and other interpretive errors or inadvertently transcribed by the computer software. Please excuse errors that have escaped final proofreading. Thank you.      Reviewed most current lab test results and cultures  YES  Reviewed most current radiology test results   YES  Review and summation of old records today    NO  Reviewed patient's current orders and MAR    YES  PMH/SH reviewed - no change compared to H&P          Assessment / Plan:    Acute left superior and inferior pubic ramus fractures:  Subacute to chronic appearing bilateral sacral insufficiency fractures:  -Orthopedic surgery consulted.  -Pain medications ordered.  -Eliquis held.  -Full dose Lovenox instead of Eliquis.    Bradycardia - of note patient had episodes of bradycardia due to unclear etiology  Continuous

## 2024-07-14 NOTE — PROGRESS NOTES
EP/ ARRHYTHMIA Progress Note    Patient ID:  Patient: Bebe Burnett  MRN: [id number]  Age: 89 y.o.  : 10/21/1934    Date of  Admission: 2024  2:54 PM   PCP:  Patricia Hoffmann APRN - NP  Usual cardiologist:  Dionte Torrez MD    Assessment:   Paroxsymal atrial fibrillation, currently in sinus rhythm/sinus bradycardia.  First degree AV block.  Sinus with blocked PAC's on admission ECG.  Doesn't need rate control prophylaxis for atrial fibrillation.  No unstable arrhythmias.  No evidence of unstable coronary issue.  No ischemia or infarct on nuclear test in 2021.  No history of CHF or evidence of decompensated CHF.  Echo EF 55-60%, mild LVH, mild AR/MR/TR.  No preload dependent cardiac disease.  Hypotension likely secondary to pain medication.  Thoracic aortic aneurysm without rupture.  PVD.  Full code.    Plan:     I feel the risk is acceptable to proceed to orthopedic surgery.  Eliquis is being held.  She met criteria for reduced dosing based on age, weight.  BP meds are being held.  Agree with IVF, she'll tolerate this.    All questions answered for the patient and family.  Patient is OK with [facility] [phone] getting cardiac updates if needed.     update:  I reviewed the tele.  No bradycardia since .  SINUS RHYTHM with PAC's, first degree AV block.  BP better.  Remains off Eliquis.  Defer to ortho for the plan.     [x]       High complexity decision making was performed in this patient at high risk for decompensation    Bebe Burnett is a 89 y.o. female with a history of a mechanical ground level fall found to have L shoulder dislocation and pelvic fractures.  Anticipated L shoulder surgery with Dr. Mathis.  She denies chest, jaw, neck, shoulder, or arm pain with exertion.  No CHF symptoms.  No TIA or stroke symptoms.    TODAY:  L arm in a sling.  She denies chest, jaw, neck, shoulder, or arm pain with exertion.  No CHF symptoms.  No TIA or  peripheral edema.  No carotid bruit.  Palpable radial pulses bilaterally.  Abdomen, soft, nontender, nondistended.  No abdominal bruit or pulstatile masses.  Extremities without cyanosis or clubbing.  Muscle tone and bulk normal for age.  Skin warm and dry.  No rashes or ulcers.  Neuro grossly nonfocal.  No tremor.  Awake and appropriate.    CARDIOGRAPHICS and STUDIES, I reviewed:    Telemetry:  SINUS RHYTHM 70's with first degree AV block.    ECG:  SINUS RHYTHM with probable blocked PAC's, first degree AV block otherwise.       Labs:  No results for input(s): \"CPK\", \"CKMB\" in the last 72 hours.    Invalid input(s): \"CPKMB\", \"CKNDX\", \"TROIQ\"  No results found for: \"CHOL\", \"CHLST\", \"CHOLV\", \"HDL\", \"HDLC\", \"LDL\"  Recent Labs     07/11/24  1820   INR 1.1        Recent Labs     07/12/24  0115 07/12/24  0131 07/13/24  0850 07/13/24  1549 07/13/24  2250 07/14/24  0509   NA  --  133* 131* 131* 129* 130*   K  --  4.6 4.6  --   --  4.8   CL  --  101 98  --   --  99   CO2  --  27 28  --   --  29   BUN  --  17 14  --   --  18   PHOS  --  3.8  --   --   --   --    WBC 9.2  --  10.0  --   --  7.9   HGB 10.8*  --  10.2*  --   --  8.4*   HCT 33.1*  --  31.6*  --   --  26.0*     --  178  --   --  131*       Estimated Creatinine Clearance: 42 mL/min (based on SCr of 0.76 mg/dL).    Recent Labs     07/11/24  1820   GLOB 3.2       No components found for: \"GLPOC\"  No results for input(s): \"PH\", \"PCO2\", \"PO2\" in the last 72 hours.      Giorgi Pyle MD 07/14/24 1:37 PM

## 2024-07-14 NOTE — PROGRESS NOTES
NAME: Bebe Burnett        :  10/21/1934        MRN:  [id number]                   Assessment   :                                               Plan:  Hyponatremia, chronic  HTN --> now hypotension  DM2  Afib  S/p fall  Pubic fx/left humerus fx  Bradycardia Sodium  ~ 130; stop NS, trend sodium; fluid restrict     Stay off thiazide from now on; doesn't need a low salt diet     BP meds on hold; BP still low at times       Subjective:     Chief Complaint:  sleeping. Chart reviewed.     Review of Systems:    Symptom Y/N Comments  Symptom Y/N Comments   Fever/Chills    Chest Pain     Poor Appetite    Edema     Cough    Abdominal Pain     Sputum    Joint Pain     SOB/WYATT    Pruritis/Rash     Nausea/vomit    Tolerating PT/OT     Diarrhea    Tolerating Diet     Constipation    Other       Could not obtain due to:      Objective:     VITALS:   Last 24hrs VS reviewed since prior progress note. Most recent are:  Vitals:    24 0313   BP: 110/61   Pulse:    Resp: 21   Temp: 98 °F (36.7 °C)   SpO2:        Intake/Output Summary (Last 24 hours) at 2024 0553  Last data filed at 2024 1230  Gross per 24 hour   Intake 682 ml   Output 300 ml   Net 382 ml      Telemetry Reviewed:     PHYSICAL EXAM:  General: NAD      Lab Data Reviewed: (see below)    Medications Reviewed: (see below)    PMH/SH reviewed - no change compared to H&P  ________________________________________________________________________  Care Plan discussed with:  Patient     Family      RN     Care Manager                    Consultant:          Comments   >50% of visit spent in counseling and coordination of care       ________________________________________________________________________  Edith Kaufman MD     Procedures: see electronic medical records for all procedures/Xrays and details which  were not copied into this note but were reviewed prior to creation of Plan.

## 2024-07-14 NOTE — PLAN OF CARE
Problem: Physical Therapy - Adult  Goal: By Discharge: Performs mobility at highest level of function for planned discharge setting.  See evaluation for individualized goals.  Description: FUNCTIONAL STATUS PRIOR TO ADMISSION: Patient was independent and active without use of DME. The patient  was independent for basic and instrumental ADLs. Drives at baseline.    HOME SUPPORT PRIOR TO ADMISSION: The patient lived with son and DIL but did not require assistance. Patient is with her daughter mostly during the day, but sleeps at her son's house.    Physical Therapy Goals  Initiated 7/12/2024  1.  Patient will move from supine to sit and sit to supine, scoot up and down, and roll side to side in bed with contact guard assist within 7 day(s).    2.  Patient will perform sit to stand with contact guard assist within 7 day(s).  3.  Patient will transfer from bed to chair and chair to bed with contact guard assist using the least restrictive device within 7 day(s).  4.  Patient will ambulate with contact guard assist for 50 feet with the least restrictive device within 7 day(s).   5.  Patient will ascend/descend 3 stairs with 1 handrail(s) with minimal assistance within 7 day(s).   Outcome: Progressing   PHYSICAL THERAPY TREATMENT    Patient: Bebe Burnett (89 y.o. female)  Date: 7/14/2024  Diagnosis: Fracture [T14.8XXA]  Closed fracture of proximal end of left humerus, unspecified fracture morphology, initial encounter [S42.202A]  Closed fracture of sacrum, unspecified portion of sacrum, initial encounter ([facility]) [S32.10XA]  Closed fracture of multiple rami of left pubis, initial encounter ([facility]) [S32.592A] Fracture      Precautions: Weight Bearing, Fall Risk, Bed Alarm Right Lower Extremity Weight Bearing: Weight Bearing As Tolerated Left Lower Extremity Weight Bearing: Weight Bearing As Tolerated   Left Upper Extremity Weight Bearing: Non Weight Bearing              ASSESSMENT:  Patient continues to benefit from  skilled PT services and is slowly progressing towards goals. Pt received supine in bed and agreeable to PT intervention. Patient initially drowsy, however become more alert with increased mobility. VS noted below. Reviewed precautions with patient prior to mobilizing. Sling intact on LUE, however needed readjustments for proper fit. Required increased assistance compared to prior therapy session. Needed mod-max A x 2 for bed mobility and transfers. Attempted bed>chair transfer to L, however unsuccessful as patient with difficulty shifting weight onto LLE. Repositioned chair and patient able to transfer bed>chair to R after seated rest break. Needed physical assist to weight shift R/L. Initial drop in BP once seated in chair, however recovered with increased time in sitting. Educated patient on the importance of OOB mobility with patient verbalizing understanding. Patient is motivated and with good effort and participation during session. Pt was left sitting in recliner with all needs met, RN aware, VSS, and chair alarm on following session.     Supine /66, HR 73 bpm, SpO2 95% on RA  Sitting /93  Standing /84  Initial sitting /85  Sitting after ~ 3 minutes 114/76         PLAN:  Patient continues to benefit from skilled intervention to address the above impairments.  Continue treatment per established plan of care.    Recommend with staff: therapy recommendations for staff: Recommend mobility with staff assist x2 using gait belt. and Recommend toileting using  recommended toilet device: a bed pan with staff assist x2 using  gait belt.    Recommend for next PT session: sit to stand transfers, bed to bedside chair transfers, further progression of gait with existing device, and assess for orthostatic hypotension    Recommendation for discharge: (in order for the patient to meet his/her long term goals): Therapy 3 hours/day 5-7 days/week    Other factors to consider for discharge: patient's  current support system is unable to meet their requirements for physical assistance, high risk for falls, not safe to be alone, concern for safely navigating or managing the home environment, and new weight bearing restrictions limiting activity or patient is unable to maintain    IF patient discharges home will need the following DME: continuing to assess with progress       SUBJECTIVE:   Patient stated, \"I feel okay.\"    OBJECTIVE DATA SUMMARY:   Critical Behavior:  Orientation  Overall Orientation Status: Within Normal Limits  Cognition  Overall Cognitive Status: WNL    Functional Mobility Training:  Bed Mobility:  Bed Mobility Training  Bed Mobility Training: Yes  Overall Level of Assistance: Moderate assistance;Maximum assistance;Assist X2;Additional time  Interventions: Safety awareness training;Tactile cues;Verbal cues  Supine to Sit: Moderate assistance;Maximum assistance;Assist X2;Additional time  Scooting: Maximum assistance  Transfers:  Transfer Training  Transfer Training: Yes  Overall Level of Assistance: Moderate assistance;Maximum assistance;Assist X2  Interventions: Safety awareness training;Tactile cues;Verbal cues  Sit to Stand: Moderate assistance;Maximum assistance;Assist X2  Stand to Sit: Moderate assistance;Maximum assistance;Assist X2  Bed to Chair: Maximum assistance;Assist X2;Additional time (HHA x 1 on R)  Balance:  Balance  Sitting: Impaired  Sitting - Static: Fair (occasional);Good (unsupported)  Sitting - Dynamic: Fair (occasional)  Standing: Impaired  Standing - Static: Poor;Constant support  Standing - Dynamic: Poor;Constant support   Ambulation/Gait Training:     Gait  Gait Training: No        Neuro Re-Education:                    Pain Rating:  C/o increased pain in LLE   Pain Intervention(s):   rest and repositioning    Activity Tolerance:   Fair , requires frequent rest breaks, SpO2 stable on room air, and signs and symptoms of orthostatic hypotension    After treatment:   Patient

## 2024-07-14 NOTE — PLAN OF CARE
Problem: Physical Therapy - Adult  Goal: By Discharge: Performs mobility at highest level of function for planned discharge setting.  See evaluation for individualized goals.  Description: FUNCTIONAL STATUS PRIOR TO ADMISSION: Patient was independent and active without use of DME. The patient  was independent for basic and instrumental ADLs. Drives at baseline.    HOME SUPPORT PRIOR TO ADMISSION: The patient lived with son and DIL but did not require assistance. Patient is with her daughter mostly during the day, but sleeps at her son's house.    Physical Therapy Goals  Initiated 7/12/2024  1.  Patient will move from supine to sit and sit to supine, scoot up and down, and roll side to side in bed with contact guard assist within 7 day(s).    2.  Patient will perform sit to stand with contact guard assist within 7 day(s).  3.  Patient will transfer from bed to chair and chair to bed with contact guard assist using the least restrictive device within 7 day(s).  4.  Patient will ambulate with contact guard assist for 50 feet with the least restrictive device within 7 day(s).   5.  Patient will ascend/descend 3 stairs with 1 handrail(s) with minimal assistance within 7 day(s).   7/14/2024 1416 by Tara Moore, PT  Outcome: Progressing     Problem: Pain  Goal: Verbalizes/displays adequate comfort level or baseline comfort level  Recent Flowsheet Documentation  Taken 7/14/2024 0842 by Deloris Weathers, RN  Verbalizes/displays adequate comfort level or baseline comfort level:   Encourage patient to monitor pain and request assistance   Assess pain using appropriate pain scale     Problem: ABCDS Injury Assessment  Goal: Absence of physical injury  Recent Flowsheet Documentation  Taken 7/14/2024 1319 by Holli Burgos RN  Absence of Physical Injury: Implement safety measures based on patient assessment     Problem: Safety - Adult  Goal: Free from fall injury  Recent Flowsheet Documentation  Taken 7/14/2024 1319 by  Holli Burgos, RN  Free From Fall Injury:   Instruct family/caregiver on patient safety   Based on caregiver fall risk screen, instruct family/caregiver to ask for assistance with transferring infant if caregiver noted to have fall risk factors     Problem: Discharge Planning  Goal: Discharge to home or other facility with appropriate resources  Recent Flowsheet Documentation  Taken 7/14/2024 0900 by Holli Burgos, RN  Discharge to home or other facility with appropriate resources: Identify barriers to discharge with patient and caregiver

## 2024-07-14 NOTE — CARE COORDINATION
CM attempted to contact the patient's daughter, Wayne Cordero (phone: [phone]), to discuss PT/OT recommendations for IPR. Patient's daughter did not answer the phone. CM left a HIPAA complaint VM requesting a return phone call.     Sirisha Juarez, LCSKADIE  [id number]

## 2024-07-15 LAB
ANION GAP SERPL CALC-SCNC: 4 MMOL/L (ref 5–15)
BASOPHILS # BLD: 0.1 K/UL (ref 0–0.1)
BASOPHILS NFR BLD: 1 % (ref 0–1)
BUN SERPL-MCNC: 15 MG/DL (ref 6–20)
BUN/CREAT SERPL: 27 (ref 12–20)
CALCIUM SERPL-MCNC: 8.4 MG/DL (ref 8.5–10.1)
CHLORIDE SERPL-SCNC: 99 MMOL/L (ref 97–108)
CO2 SERPL-SCNC: 28 MMOL/L (ref 21–32)
CREAT SERPL-MCNC: 0.56 MG/DL (ref 0.55–1.02)
DIFFERENTIAL METHOD BLD: ABNORMAL
EOSINOPHIL # BLD: 0.4 K/UL (ref 0–0.4)
EOSINOPHIL NFR BLD: 5 % (ref 0–7)
ERYTHROCYTE [DISTWIDTH] IN BLOOD BY AUTOMATED COUNT: 13.6 % (ref 11.5–14.5)
GLUCOSE SERPL-MCNC: 149 MG/DL (ref 65–100)
HCT VFR BLD AUTO: 25.3 % (ref 35–47)
HGB BLD-MCNC: 8.2 G/DL (ref 11.5–16)
IMM GRANULOCYTES # BLD AUTO: 0.1 K/UL (ref 0–0.04)
IMM GRANULOCYTES NFR BLD AUTO: 1 % (ref 0–0.5)
LYMPHOCYTES # BLD: 1.7 K/UL (ref 0.8–3.5)
LYMPHOCYTES NFR BLD: 20 % (ref 12–49)
MCH RBC QN AUTO: 28.5 PG (ref 26–34)
MCHC RBC AUTO-ENTMCNC: 32.4 G/DL (ref 30–36.5)
MCV RBC AUTO: 87.8 FL (ref 80–99)
MONOCYTES # BLD: 1 K/UL (ref 0–1)
MONOCYTES NFR BLD: 12 % (ref 5–13)
NEUTS SEG # BLD: 5.1 K/UL (ref 1.8–8)
NEUTS SEG NFR BLD: 61 % (ref 32–75)
NRBC # BLD: 0 K/UL (ref 0–0.01)
NRBC BLD-RTO: 0 PER 100 WBC
PLATELET # BLD AUTO: 159 K/UL (ref 150–400)
PMV BLD AUTO: 10.3 FL (ref 8.9–12.9)
POTASSIUM SERPL-SCNC: 4.8 MMOL/L (ref 3.5–5.1)
RBC # BLD AUTO: 2.88 M/UL (ref 3.8–5.2)
SODIUM SERPL-SCNC: 128 MMOL/L (ref 136–145)
SODIUM SERPL-SCNC: 131 MMOL/L (ref 136–145)
WBC # BLD AUTO: 8.4 K/UL (ref 3.6–11)

## 2024-07-15 PROCEDURE — 97530 THERAPEUTIC ACTIVITIES: CPT

## 2024-07-15 PROCEDURE — 2580000003 HC RX 258: Performed by: STUDENT IN AN ORGANIZED HEALTH CARE EDUCATION/TRAINING PROGRAM

## 2024-07-15 PROCEDURE — 6370000000 HC RX 637 (ALT 250 FOR IP): Performed by: STUDENT IN AN ORGANIZED HEALTH CARE EDUCATION/TRAINING PROGRAM

## 2024-07-15 PROCEDURE — 80048 BASIC METABOLIC PNL TOTAL CA: CPT

## 2024-07-15 PROCEDURE — 97535 SELF CARE MNGMENT TRAINING: CPT

## 2024-07-15 PROCEDURE — 6360000002 HC RX W HCPCS: Performed by: STUDENT IN AN ORGANIZED HEALTH CARE EDUCATION/TRAINING PROGRAM

## 2024-07-15 PROCEDURE — 36415 COLL VENOUS BLD VENIPUNCTURE: CPT

## 2024-07-15 PROCEDURE — 6370000000 HC RX 637 (ALT 250 FOR IP): Performed by: INTERNAL MEDICINE

## 2024-07-15 PROCEDURE — 84295 ASSAY OF SERUM SODIUM: CPT

## 2024-07-15 PROCEDURE — 1100000000 HC RM PRIVATE

## 2024-07-15 PROCEDURE — 85025 COMPLETE CBC W/AUTO DIFF WBC: CPT

## 2024-07-15 PROCEDURE — 97116 GAIT TRAINING THERAPY: CPT

## 2024-07-15 RX ORDER — LISINOPRIL 20 MG/1
20 TABLET ORAL DAILY
Status: DISCONTINUED | OUTPATIENT
Start: 2024-07-15 | End: 2024-07-19 | Stop reason: HOSPADM

## 2024-07-15 RX ORDER — HYDROCHLOROTHIAZIDE 25 MG/1
12.5 TABLET ORAL DAILY
Status: DISCONTINUED | OUTPATIENT
Start: 2024-07-15 | End: 2024-07-17

## 2024-07-15 RX ORDER — LISINOPRIL AND HYDROCHLOROTHIAZIDE 20; 12.5 MG/1; MG/1
2 TABLET ORAL DAILY
Status: DISCONTINUED | OUTPATIENT
Start: 2024-07-15 | End: 2024-07-15 | Stop reason: SDUPTHER

## 2024-07-15 RX ORDER — CLONIDINE HYDROCHLORIDE 0.1 MG/1
0.1 TABLET ORAL 2 TIMES DAILY
Status: DISCONTINUED | OUTPATIENT
Start: 2024-07-15 | End: 2024-07-17

## 2024-07-15 RX ORDER — ACETAMINOPHEN 650 MG/1
650 SUPPOSITORY RECTAL EVERY 6 HOURS PRN
Status: DISCONTINUED | OUTPATIENT
Start: 2024-07-15 | End: 2024-07-19 | Stop reason: HOSPADM

## 2024-07-15 RX ORDER — ACETAMINOPHEN 325 MG/1
650 TABLET ORAL EVERY 4 HOURS PRN
Status: DISCONTINUED | OUTPATIENT
Start: 2024-07-15 | End: 2024-07-19 | Stop reason: HOSPADM

## 2024-07-15 RX ORDER — ALLOPURINOL 100 MG/1
100 TABLET ORAL DAILY
Status: DISCONTINUED | OUTPATIENT
Start: 2024-07-15 | End: 2024-07-15

## 2024-07-15 RX ADMIN — SODIUM CHLORIDE, PRESERVATIVE FREE 10 ML: 5 INJECTION INTRAVENOUS at 21:06

## 2024-07-15 RX ADMIN — LEVOTHYROXINE SODIUM 88 MCG: 88 TABLET ORAL at 05:57

## 2024-07-15 RX ADMIN — ENOXAPARIN SODIUM 60 MG: 100 INJECTION SUBCUTANEOUS at 21:06

## 2024-07-15 RX ADMIN — SODIUM CHLORIDE, PRESERVATIVE FREE 10 ML: 5 INJECTION INTRAVENOUS at 08:35

## 2024-07-15 RX ADMIN — POLYETHYLENE GLYCOL 3350 17 G: 17 POWDER, FOR SOLUTION ORAL at 08:35

## 2024-07-15 RX ADMIN — ATORVASTATIN CALCIUM 40 MG: 40 TABLET, FILM COATED ORAL at 21:07

## 2024-07-15 RX ADMIN — LATANOPROST 1 DROP: 50 SOLUTION OPHTHALMIC at 22:00

## 2024-07-15 RX ADMIN — HYDROCODONE BITARTRATE AND ACETAMINOPHEN 1 TABLET: 10; 325 TABLET ORAL at 11:02

## 2024-07-15 RX ADMIN — ENOXAPARIN SODIUM 60 MG: 100 INJECTION SUBCUTANEOUS at 08:35

## 2024-07-15 RX ADMIN — BRIMONIDINE TARTRATE 1 DROP: 2 SOLUTION OPHTHALMIC at 08:36

## 2024-07-15 RX ADMIN — ACETAMINOPHEN 650 MG: 325 TABLET ORAL at 12:33

## 2024-07-15 RX ADMIN — HYDROCODONE BITARTRATE AND ACETAMINOPHEN 1 TABLET: 10; 325 TABLET ORAL at 19:37

## 2024-07-15 RX ADMIN — BRIMONIDINE TARTRATE 1 DROP: 2 SOLUTION OPHTHALMIC at 22:00

## 2024-07-15 RX ADMIN — SERTRALINE HYDROCHLORIDE 25 MG: 25 TABLET ORAL at 21:07

## 2024-07-15 ASSESSMENT — PAIN - FUNCTIONAL ASSESSMENT
PAIN_FUNCTIONAL_ASSESSMENT: PREVENTS OR INTERFERES SOME ACTIVE ACTIVITIES AND ADLS
PAIN_FUNCTIONAL_ASSESSMENT: ACTIVITIES ARE NOT PREVENTED
PAIN_FUNCTIONAL_ASSESSMENT: ACTIVITIES ARE NOT PREVENTED

## 2024-07-15 ASSESSMENT — PAIN DESCRIPTION - LOCATION
LOCATION: HIP
LOCATION: HIP;ARM
LOCATION: HEAD

## 2024-07-15 ASSESSMENT — PAIN SCALES - GENERAL
PAINLEVEL_OUTOF10: 7
PAINLEVEL_OUTOF10: 2
PAINLEVEL_OUTOF10: 5
PAINLEVEL_OUTOF10: 5
PAINLEVEL_OUTOF10: 0
PAINLEVEL_OUTOF10: 5
PAINLEVEL_OUTOF10: 5
PAINLEVEL_OUTOF10: 0

## 2024-07-15 ASSESSMENT — PAIN DESCRIPTION - ORIENTATION
ORIENTATION: ANTERIOR
ORIENTATION: LEFT
ORIENTATION: ANTERIOR
ORIENTATION: ANTERIOR
ORIENTATION: RIGHT;LEFT

## 2024-07-15 ASSESSMENT — PAIN DESCRIPTION - DESCRIPTORS
DESCRIPTORS: ACHING

## 2024-07-15 ASSESSMENT — PAIN SCALES - WONG BAKER
WONGBAKER_NUMERICALRESPONSE: HURTS LITTLE MORE
WONGBAKER_NUMERICALRESPONSE: HURTS LITTLE MORE

## 2024-07-15 NOTE — PLAN OF CARE
Problem: Pain  Goal: Verbalizes/displays adequate comfort level or baseline comfort level  Recent Flowsheet Documentation  Taken 7/15/2024 0800 by Deloris Weathers RN  Verbalizes/displays adequate comfort level or baseline comfort level:   Encourage patient to monitor pain and request assistance   Assess pain using appropriate pain scale  7/15/2024 0659 by Tori Sapp RN  Outcome: Progressing     Problem: Skin/Tissue Integrity  Goal: Absence of new skin breakdown  Description: 1.  Monitor for areas of redness and/or skin breakdown  2.  Assess vascular access sites hourly  3.  Every 4-6 hours minimum:  Change oxygen saturation probe site  4.  Every 4-6 hours:  If on nasal continuous positive airway pressure, respiratory therapy assess nares and determine need for appliance change or resting period.  7/15/2024 0659 by Tori Sapp RN  Outcome: Progressing     Problem: ABCDS Injury Assessment  Goal: Absence of physical injury  Recent Flowsheet Documentation  Taken 7/15/2024 1155 by Holli Burgos RN  Absence of Physical Injury: Implement safety measures based on patient assessment  7/15/2024 0659 by Tori Sapp RN  Outcome: Progressing     Problem: Safety - Adult  Goal: Free from fall injury  Recent Flowsheet Documentation  Taken 7/15/2024 1155 by Holli Burgos RN  Free From Fall Injury: Instruct family/caregiver on patient safety  7/15/2024 0659 by Tori Sapp RN  Outcome: Progressing     Problem: Physical Therapy - Adult  Goal: By Discharge: Performs mobility at highest level of function for planned discharge setting.  See evaluation for individualized goals.  Description: FUNCTIONAL STATUS PRIOR TO ADMISSION: Patient was independent and active without use of DME. The patient  was independent for basic and instrumental ADLs. Drives at baseline.    HOME SUPPORT PRIOR TO ADMISSION: The patient lived with son and DIL but did not require assistance. Patient is with her daughter mostly during  the day, but sleeps at her son's house.    Physical Therapy Goals  Initiated 7/12/2024  1.  Patient will move from supine to sit and sit to supine, scoot up and down, and roll side to side in bed with contact guard assist within 7 day(s).    2.  Patient will perform sit to stand with contact guard assist within 7 day(s).  3.  Patient will transfer from bed to chair and chair to bed with contact guard assist using the least restrictive device within 7 day(s).  4.  Patient will ambulate with contact guard assist for 50 feet with the least restrictive device within 7 day(s).   5.  Patient will ascend/descend 3 stairs with 1 handrail(s) with minimal assistance within 7 day(s).   7/15/2024 1322 by Yu Mendoza, PT  Outcome: Progressing     Problem: Occupational Therapy - Adult  Goal: By Discharge: Performs self-care activities at highest level of function for planned discharge setting.  See evaluation for individualized goals.  Description: FUNCTIONAL STATUS PRIOR TO ADMISSION:  Patient was ambulatory, performing all ADLs and IADLs.     HOME SUPPORT: Patient lived with her son but did not require assistance.      Occupational Therapy Goals  Initiated 7/12/2024   1.  Patient will don/doff arm sling prn at Standby Assist level within 7 days.  2.  Patient will perform upper-body dressing at Standby Assist within 7 days.   3.  Patient will perform lower-body dressing at Moderate Assist within 7 days.   4.  Patient will perform toilet transfer at Minimal Assist within 7 days.  5.  Patient will perform all aspects of toileting at Minimal Assist within 7 days.   6.  Patient will participate in therapeutic activity/exercise as cleared in prep for ADLs with Supervision within 7 days.   7.  Patient will perform seated grooming/self-feeding using compensatory strategies prn with Standby Assist within 7 days.   7/15/2024 1322 by Crystal Caraballo, OT  Outcome: Progressing

## 2024-07-15 NOTE — PLAN OF CARE
Problem: Occupational Therapy - Adult  Goal: By Discharge: Performs self-care activities at highest level of function for planned discharge setting.  See evaluation for individualized goals.  Description: FUNCTIONAL STATUS PRIOR TO ADMISSION:  Patient was ambulatory, performing all ADLs and IADLs.     HOME SUPPORT: Patient lived with her son but did not require assistance.      Occupational Therapy Goals  Initiated 7/12/2024   1.  Patient will don/doff arm sling prn at Standby Assist level within 7 days.  2.  Patient will perform upper-body dressing at Standby Assist within 7 days.   3.  Patient will perform lower-body dressing at Moderate Assist within 7 days.   4.  Patient will perform toilet transfer at Minimal Assist within 7 days.  5.  Patient will perform all aspects of toileting at Minimal Assist within 7 days.   6.  Patient will participate in therapeutic activity/exercise as cleared in prep for ADLs with Supervision within 7 days.   7.  Patient will perform seated grooming/self-feeding using compensatory strategies prn with Standby Assist within 7 days.   Outcome: Progressing   OCCUPATIONAL THERAPY TREATMENT  Patient: Bebe Burnett (89 y.o. female)  Date: 7/15/2024  Primary Diagnosis: Fracture [T14.8XXA]  Closed fracture of proximal end of left humerus, unspecified fracture morphology, initial encounter [S42.202A]  Closed fracture of sacrum, unspecified portion of sacrum, initial encounter ([facility]) [S32.10XA]  Closed fracture of multiple rami of left pubis, initial encounter ([facility]) [S32.592A]       Precautions: Weight Bearing, Fall Risk, Bed Alarm Right Lower Extremity Weight Bearing: Weight Bearing As Tolerated Left Lower Extremity Weight Bearing: Weight Bearing As Tolerated   Left Upper Extremity Weight Bearing: Non Weight Bearing        Chart, occupational therapy assessment, plan of care, and goals were reviewed.    ASSESSMENT  Patient continues to benefit from skilled OT services and is slowly  Training  Rolling: Maximum assistance;Total assistance;Assist X2  Supine to Sit: Total assistance;Maximum assistance;Assist X2  Scooting: Maximum assistance;Assist X2     Transfers:   Transfer Training  Transfer Training: Yes  Interventions: Verbal cues;Safety awareness training;Manual cues;Tactile cues  Sit to Stand: Moderate assistance;Maximum assistance;Assist X2  Stand to Sit: Moderate assistance;Maximum assistance;Assist X2  Bed to Chair: Moderate assistance;Maximum assistance;Assist X2           Balance:     Balance  Sitting: Intact;Without support  Sitting - Static: Good (unsupported)  Sitting - Dynamic: Fair (occasional)  Standing: Impaired;With support  Standing - Static: Poor;Constant support  Standing - Dynamic: Poor;Constant support      ADL Intervention:         Feeding: Setup   Feeding Skilled Clinical Factors: setup of tray, sitting up in chair she does much better with self feeding     Grooming: Minimal assistance;Stand by assistance;Increased time to complete   Grooming Skilled Clinical Factors: sitting in chair    UE Bathing: Moderate assistance;Maximum assistance  UE Bathing Skilled Clinical Factors: due to fx left humerus    LE Bathing: Maximum assistance  LE Bathing Skilled Clinical Factors: she was able to stand with min of 2 and clean veronica are after voiding    UE Dressing: Maximum assistance;Moderate assistance       LE Dressing: Dependent/Total;Maximum assistance       Activity Tolerance:   Fair   Please refer to the flowsheet for vital signs taken during this treatment.    After treatment:   Patient left in no apparent distress sitting up in chair, Call bell within reach, and Bed/ chair alarm activated    COMMUNICATION/EDUCATION:   The patient's plan of care was discussed with: physical therapist and registered nurse    Patient Education  Education Given To: Patient  Education Provided: Role of Therapy;Plan of Care;Precautions;Transfer Training;Fall Prevention Strategies  Education Method:  Verbal;Demonstration  Barriers to Learning: Other (Comment) (lethargic/processing slow)  Education Outcome: Continued education needed    Thank you for this referral.  Crystal Caraballo OT  Minutes: 52

## 2024-07-15 NOTE — PROGRESS NOTES
Hospitalist Progress Note    NAME: Bbee Burnett   :  10/21/1934   MRN:  [id number]            Subjective:     Chief Complaint / Reason for Physician Visit Fall  Patient seen and evaluated at bedside, overnight events reviewed.  Discussed with RN events overnight. Tolerating PT/OT     Review of Systems:  Symptom Y/N Comments  Symptom Y/N Comments   Fever/Chills N   Chest Pain N    Poor Appetite Y   Edema N    Cough N   Abdominal Pain N    Sputum N   Joint Pain N    SOB/WYATT N   Pruritis/Rash N    Nausea/vomit N   Tolerating PT/OT NA    Diarrhea N   Tolerating Diet Y    Constipation N   Other       Could NOT obtain due to:      Objective:     VITALS:   Last 24hrs VS reviewed since prior progress note. Most recent are:  [unfilled]    Intake/Output Summary (Last 24 hours) at 7/15/2024 1410  Last data filed at 7/15/2024 0600  Gross per 24 hour   Intake 370 ml   Output 850 ml   Net -480 ml        PHYSICAL EXAM:  General: Patient appears comfortable    EENT:  EOMI. Anicteric sclerae. MMM  Resp:  CTA bilaterally, no wheezing or rales.  No accessory muscle use  CV:  Regular  rhythm, s1/s2 no m/r/g No edema  GI:  Soft, Non distended, Non tender.  +Bowel sounds  Neurologic:  Alert and oriented X 3, normal speech,   Psych:   Good insight. Not anxious nor agitated  Skin:  No rashes.  No jaundice    Procedures: see electronic medical records for all procedures/Xrays and details which were not copied into this note but were reviewed prior to creation of Plan.      LABS:  I reviewed today's most current labs and imaging studies.  Pertinent labs include:  Recent Labs     24  0850 24  0509 07/15/24  0404   WBC 10.0 7.9 8.4   HGB 10.2* 8.4* 8.2*   HCT 31.6* 26.0* 25.3*    131* 159     Recent Labs     24  0850 24  1549 24  0509 24  1349 07/15/24  0404   *   < > 130* 131* 131*   K 4.6  --  4.8  --  4.8   CL 98  --  99  --  99   CO2 28  --  29  --  28   BUN 14  --  18  --  15    < > =    Essential hypertension: continue to hold antihypertensive medications  Hyperlipidemia:  Paroxysmal atrial fibrillation:  Coronary artery disease:  GERD:  Hypothyroidism:  Ascending aortic aneurysm:  Chronic Hyponatremia. HCTZ contraindicated. Monitor BMP, Prone for SIADH.   -On hold  lisinopril, hydrochlorothiazide, clonidine,   - Continue with Lipitor, Synthroid, Zoloft.  -Continue Lovenox, transition to oral AC when ok by Cardiology.    18.5 - 24.9 Normal weight / Body mass index is 21.87 kg/m².    Code status: Full code  Prophylaxis: Lovenox  Recommended Disposition: SNF placement, stable for discharge     ________________________________________________________________________  Care Plan discussed with:    Comments   Patient x    Family  x    RN x    Care Manager x    Consultant  x                     x Multidiciplinary team rounds were held today with , nursing, pharmacist and clinical coordinator.  Patient's plan of care was discussed; medications were reviewed and discharge planning was addressed.     ________________________________________________________________________  Total NON critical care TIME:  51  Minutes      Comments   >50% of visit spent in counseling and coordination of care x    ________________________________________________________________________  Maximo Murphy MD

## 2024-07-15 NOTE — PROGRESS NOTES
TRANSFER - OUT REPORT:    Verbal report given to Medical Surgical floor on Bebe Burnett  being transferred to [facility] for routine progression of patient care       Report consisted of patient's Situation, Background, Assessment and   Recommendations(SBAR).     Information from the following report(s) Nurse Handoff Report was reviewed with the receiving nurse.           Lines:   Peripheral IV 07/13/24 Right;Anterior Cephalic (Active)   Site Assessment Clean, dry & intact 07/15/24 1702   Line Status Capped 07/15/24 1702   Line Care Connections checked and tightened 07/15/24 1702   Phlebitis Assessment No symptoms 07/15/24 1702   Infiltration Assessment 0 07/15/24 1702   Alcohol Cap Used Yes 07/15/24 1702   Dressing Status Clean, dry & intact 07/15/24 1702   Dressing Type Transparent 07/15/24 1702   Dressing Intervention Other (Comment) 07/15/24 0400        Opportunity for questions and clarification was provided.      Patient transported with:  Tech

## 2024-07-15 NOTE — PLAN OF CARE
Problem: Physical Therapy - Adult  Goal: By Discharge: Performs mobility at highest level of function for planned discharge setting.  See evaluation for individualized goals.  Description: FUNCTIONAL STATUS PRIOR TO ADMISSION: Patient was independent and active without use of DME. The patient  was independent for basic and instrumental ADLs. Drives at baseline.    HOME SUPPORT PRIOR TO ADMISSION: The patient lived with son and DIL but did not require assistance. Patient is with her daughter mostly during the day, but sleeps at her son's house.    Physical Therapy Goals  Initiated 7/12/2024  1.  Patient will move from supine to sit and sit to supine, scoot up and down, and roll side to side in bed with contact guard assist within 7 day(s).    2.  Patient will perform sit to stand with contact guard assist within 7 day(s).  3.  Patient will transfer from bed to chair and chair to bed with contact guard assist using the least restrictive device within 7 day(s).  4.  Patient will ambulate with contact guard assist for 50 feet with the least restrictive device within 7 day(s).   5.  Patient will ascend/descend 3 stairs with 1 handrail(s) with minimal assistance within 7 day(s).   Outcome: Progressing   PHYSICAL THERAPY TREATMENT    Patient: Bebe Burnett (89 y.o. female)  Date: 7/15/2024  Diagnosis: Fracture [T14.8XXA]  Closed fracture of proximal end of left humerus, unspecified fracture morphology, initial encounter [S42.202A]  Closed fracture of sacrum, unspecified portion of sacrum, initial encounter ([facility]) [S32.10XA]  Closed fracture of multiple rami of left pubis, initial encounter ([facility]) [S32.592A] Fracture      Precautions: Weight Bearing, Fall Risk, Bed Alarm Right Lower Extremity Weight Bearing: Weight Bearing As Tolerated Left Lower Extremity Weight Bearing: Weight Bearing As Tolerated   Left Upper Extremity Weight Bearing: Non Weight Bearing              ASSESSMENT:  Patient continues to benefit from  skilled PT services and is slowly progressing towards goals. She continues to demonstrate difficulty with LE weight bearing and weight shifting. Patient requires Max Ax2 to maintain upright standing posture including tactile cues to tuck pelvis and shift weight anteriorly. She requires Max A to weight shift and manual assistance to clear each LE. Variety of methods are used to assist with weight shifting in order to increase patient independence. She would likely benefit from hemiwalker or quad cane. BP is soft but stable and patient denies symptoms during mobility          PLAN:  Patient continues to benefit from skilled intervention to address the above impairments.  Continue treatment per established plan of care.        Recommendation for discharge: (in order for the patient to meet his/her long term goals): Therapy 3 hours/day 5-7 days/week    Other factors to consider for discharge: available support system works or is unable to provide adequate supervision and the patient would be alone, high risk for falls, not safe to be alone, and concern for safely navigating or managing the home environment    IF patient discharges home will need the following DME: continuing to assess with progress       SUBJECTIVE:   Patient stated, \".\"    OBJECTIVE DATA SUMMARY:   Critical Behavior:  Orientation  Orientation Level: Oriented to place;Oriented to situation;Oriented to person  Cognition  Overall Cognitive Status: Exceptions  Arousal/Alertness: Inconsistent responses to stimuli (lethargic today)  Following Commands: Follows one step commands with increased time;Follows one step commands with repetition  Attention Span: Impaired  Safety Judgement: Appears intact  Problem Solving: Assistance required to identify errors made;Assistance required to correct errors made  Insights: Decreased awareness of deficits  Initiation: Requires cues for some  Sequencing: Requires cues for some    Functional Mobility Training:  Bed

## 2024-07-15 NOTE — PROGRESS NOTES
EP/ ARRHYTHMIA Progress Note    Patient ID:  Patient: Bebe Burnett  MRN: [id number]  Age: 89 y.o.  : 10/21/1934    Date of  Admission: 2024  2:54 PM   PCP:  Patricia Hoffmann APRN - NP  Usual cardiologist:  Dionte Torrez MD    Assessment:   Paroxsymal atrial fibrillation, currently in sinus rhythm/sinus bradycardia.  First degree AV block.  Sinus with blocked PAC's on admission ECG.  Doesn't need rate control prophylaxis for atrial fibrillation.  No unstable arrhythmias.  No evidence of unstable coronary issue.  No ischemia or infarct on nuclear test in 2021.  No history of CHF or evidence of decompensated CHF.  Echo EF 55-60%, mild LVH, mild AR/MR/TR.  No preload dependent cardiac disease.  Hypotension likely secondary to pain medication.  Thoracic aortic aneurysm without rupture.  PVD.  Full code.    Plan:     I feel the risk is acceptable to proceed to orthopedic surgery.  Eliquis is being held.  She met criteria for reduced dosing based on age, weight.  BP meds are being held.  Agree with IVF, she'll tolerate this.    All questions answered for the patient and family.  Patient is OK with Rodolfo Ordoñez [phone] getting cardiac updates if needed.     update:  I reviewed the tele.  No bradycardia since .  SINUS RHYTHM with PAC's, first degree AV block.  BP better.  Remains off Eliquis.  Defer to ortho for the plan.    7/15 update:  No change.  Antihypertensives still held.  Question if all of these meds are needed.     [x]       High complexity decision making was performed in this patient at high risk for decompensation    Bebe Burnett is a 89 y.o. female with a history of a mechanical ground level fall found to have L shoulder dislocation and pelvic fractures.  Anticipated L shoulder surgery with Dr. Mathis.  She denies chest, jaw, neck, shoulder, or arm pain with exertion.  No CHF symptoms.  No TIA or stroke symptoms.    TODAY:  L arm in a

## 2024-07-15 NOTE — PROGRESS NOTES
Caller: patient     Doctor: Stacey    Reason for call: patient decided to care for her foot first, not interested in continuing treatment for her knee at this time.      Call back#: [phone]       LABS:  Recent Labs     07/14/24  0509 07/15/24  0404   WBC 7.9 8.4   HGB 8.4* 8.2*   HCT 26.0* 25.3*   * 159     Recent Labs     07/13/24  0850 07/13/24  1549 07/14/24  0509 07/14/24  1349 07/15/24  0404   *   < > 130* 131* 131*   K 4.6  --  4.8  --  4.8   CL 98  --  99  --  99   CO2 28  --  29  --  28   BUN 14  --  18  --  15    < > = values in this interval not displayed.     No results for input(s): \"TP\", \"GLOB\", \"GGT\" in the last 72 hours.    Invalid input(s): \"SGOT\", \"GPT\", \"AP\", \"TBIL\", \"ALB\", \"AML\", \"AMYP\", \"LPSE\", \"HLPSE\"    No results for input(s): \"INR\", \"APTT\" in the last 72 hours.    Invalid input(s): \"PTP\"     No results for input(s): \"TIBC\" in the last 72 hours.    Invalid input(s): \"FE\", \"PSAT\", \"FERR\"   No results found for: \"RBCF\"   No results for input(s): \"PH\", \"PCO2\", \"PO2\" in the last 72 hours.  No results for input(s): \"CPK\", \"CKMB\", \"TROPONINI\" in the last 72 hours.  No components found for: \"GLPOC\"  @labua@    MEDICATIONS:  Current Facility-Administered Medications   Medication Dose Route Frequency    [Held by provider] cloNIDine (CATAPRES) tablet 0.1 mg  0.1 mg Oral BID    [Held by provider] lisinopril (PRINIVIL;ZESTRIL) tablet 20 mg  20 mg Oral Daily    And    [Held by provider] hydroCHLOROthiazide (HYDRODIURIL) tablet 12.5 mg  12.5 mg Oral Daily    lidocaine 4 % external patch 2 patch  2 patch TransDERmal Daily    calcium carbonate (TUMS) chewable tablet 500 mg  500 mg Oral TID PRN    cyclobenzaprine (FLEXERIL) tablet 10 mg  10 mg Oral TID PRN    acetaminophen (TYLENOL) tablet 650 mg  650 mg Oral Q4H PRN    sodium chloride flush 0.9 % injection 5-40 mL  5-40 mL IntraVENous 2 times per day    sodium chloride flush 0.9 % injection 5-40 mL  5-40 mL IntraVENous PRN    0.9 % sodium chloride infusion   IntraVENous PRN    ondansetron (ZOFRAN-ODT) disintegrating tablet 4 mg  4 mg Oral Q8H PRN    Or    ondansetron (ZOFRAN) injection 4 mg  4 mg IntraVENous Q6H PRN    acetaminophen  (TYLENOL) tablet 650 mg  650 mg Oral Q6H PRN    Or    acetaminophen (TYLENOL) suppository 650 mg  650 mg Rectal Q6H PRN    atorvastatin (LIPITOR) tablet 40 mg  40 mg Oral Nightly    brimonidine (ALPHAGAN) 0.2 % ophthalmic solution 1 drop  1 drop Both Eyes BID    latanoprost (XALATAN) 0.005 % ophthalmic solution 1 drop  1 drop Both Eyes Nightly    levothyroxine (SYNTHROID) tablet 88 mcg  88 mcg Oral Daily    sertraline (ZOLOFT) tablet 25 mg  25 mg Oral Nightly    enoxaparin (LOVENOX) injection 60 mg  1 mg/kg SubCUTAneous BID    polyethylene glycol (GLYCOLAX) packet 17 g  17 g Oral Daily    HYDROcodone-acetaminophen (NORCO)  MG per tablet 1 tablet  1 tablet Oral Q6H PRN    morphine (PF) injection 2 mg  2 mg IntraVENous Q4H PRN

## 2024-07-16 LAB
ANION GAP SERPL CALC-SCNC: 6 MMOL/L (ref 5–15)
BASOPHILS # BLD: 0 K/UL (ref 0–0.1)
BASOPHILS NFR BLD: 1 % (ref 0–1)
BUN SERPL-MCNC: 13 MG/DL (ref 6–20)
BUN/CREAT SERPL: 27 (ref 12–20)
CALCIUM SERPL-MCNC: 8.6 MG/DL (ref 8.5–10.1)
CHLORIDE SERPL-SCNC: 98 MMOL/L (ref 97–108)
CO2 SERPL-SCNC: 26 MMOL/L (ref 21–32)
CREAT SERPL-MCNC: 0.48 MG/DL (ref 0.55–1.02)
DIFFERENTIAL METHOD BLD: ABNORMAL
EOSINOPHIL # BLD: 0.4 K/UL (ref 0–0.4)
EOSINOPHIL NFR BLD: 5 % (ref 0–7)
ERYTHROCYTE [DISTWIDTH] IN BLOOD BY AUTOMATED COUNT: 13.7 % (ref 11.5–14.5)
GLUCOSE SERPL-MCNC: 135 MG/DL (ref 65–100)
HCT VFR BLD AUTO: 25.2 % (ref 35–47)
HGB BLD-MCNC: 8.4 G/DL (ref 11.5–16)
IMM GRANULOCYTES # BLD AUTO: 0.1 K/UL (ref 0–0.04)
IMM GRANULOCYTES NFR BLD AUTO: 2 % (ref 0–0.5)
LYMPHOCYTES # BLD: 1.2 K/UL (ref 0.8–3.5)
LYMPHOCYTES NFR BLD: 15 % (ref 12–49)
MCH RBC QN AUTO: 28.5 PG (ref 26–34)
MCHC RBC AUTO-ENTMCNC: 33.3 G/DL (ref 30–36.5)
MCV RBC AUTO: 85.4 FL (ref 80–99)
MONOCYTES # BLD: 0.9 K/UL (ref 0–1)
MONOCYTES NFR BLD: 12 % (ref 5–13)
NEUTS SEG # BLD: 5.1 K/UL (ref 1.8–8)
NEUTS SEG NFR BLD: 65 % (ref 32–75)
NRBC # BLD: 0.04 K/UL (ref 0–0.01)
NRBC BLD-RTO: 0.5 PER 100 WBC
PLATELET # BLD AUTO: 173 K/UL (ref 150–400)
PMV BLD AUTO: 9.9 FL (ref 8.9–12.9)
POTASSIUM SERPL-SCNC: 4.7 MMOL/L (ref 3.5–5.1)
RBC # BLD AUTO: 2.95 M/UL (ref 3.8–5.2)
SODIUM SERPL-SCNC: 130 MMOL/L (ref 136–145)
SODIUM SERPL-SCNC: 130 MMOL/L (ref 136–145)
WBC # BLD AUTO: 7.7 K/UL (ref 3.6–11)

## 2024-07-16 PROCEDURE — 85025 COMPLETE CBC W/AUTO DIFF WBC: CPT

## 2024-07-16 PROCEDURE — 6370000000 HC RX 637 (ALT 250 FOR IP): Performed by: INTERNAL MEDICINE

## 2024-07-16 PROCEDURE — 80048 BASIC METABOLIC PNL TOTAL CA: CPT

## 2024-07-16 PROCEDURE — 2580000003 HC RX 258: Performed by: STUDENT IN AN ORGANIZED HEALTH CARE EDUCATION/TRAINING PROGRAM

## 2024-07-16 PROCEDURE — 6360000002 HC RX W HCPCS: Performed by: STUDENT IN AN ORGANIZED HEALTH CARE EDUCATION/TRAINING PROGRAM

## 2024-07-16 PROCEDURE — 36415 COLL VENOUS BLD VENIPUNCTURE: CPT

## 2024-07-16 PROCEDURE — 84295 ASSAY OF SERUM SODIUM: CPT

## 2024-07-16 PROCEDURE — 97530 THERAPEUTIC ACTIVITIES: CPT

## 2024-07-16 PROCEDURE — 1100000000 HC RM PRIVATE

## 2024-07-16 PROCEDURE — 6370000000 HC RX 637 (ALT 250 FOR IP): Performed by: STUDENT IN AN ORGANIZED HEALTH CARE EDUCATION/TRAINING PROGRAM

## 2024-07-16 RX ADMIN — LATANOPROST 1 DROP: 50 SOLUTION OPHTHALMIC at 21:40

## 2024-07-16 RX ADMIN — SODIUM CHLORIDE, PRESERVATIVE FREE 10 ML: 5 INJECTION INTRAVENOUS at 21:40

## 2024-07-16 RX ADMIN — SODIUM CHLORIDE, PRESERVATIVE FREE 10 ML: 5 INJECTION INTRAVENOUS at 10:12

## 2024-07-16 RX ADMIN — BRIMONIDINE TARTRATE 1 DROP: 2 SOLUTION OPHTHALMIC at 10:16

## 2024-07-16 RX ADMIN — HYDROCODONE BITARTRATE AND ACETAMINOPHEN 1 TABLET: 10; 325 TABLET ORAL at 10:11

## 2024-07-16 RX ADMIN — LEVOTHYROXINE SODIUM 88 MCG: 88 TABLET ORAL at 06:18

## 2024-07-16 RX ADMIN — BRIMONIDINE TARTRATE 1 DROP: 2 SOLUTION OPHTHALMIC at 21:40

## 2024-07-16 RX ADMIN — ENOXAPARIN SODIUM 60 MG: 100 INJECTION SUBCUTANEOUS at 21:30

## 2024-07-16 RX ADMIN — ATORVASTATIN CALCIUM 40 MG: 40 TABLET, FILM COATED ORAL at 21:39

## 2024-07-16 RX ADMIN — POLYETHYLENE GLYCOL 3350 17 G: 17 POWDER, FOR SOLUTION ORAL at 10:11

## 2024-07-16 RX ADMIN — ENOXAPARIN SODIUM 60 MG: 100 INJECTION SUBCUTANEOUS at 10:12

## 2024-07-16 RX ADMIN — SERTRALINE HYDROCHLORIDE 25 MG: 25 TABLET ORAL at 21:39

## 2024-07-16 RX ADMIN — HYDROCODONE BITARTRATE AND ACETAMINOPHEN 1 TABLET: 10; 325 TABLET ORAL at 23:35

## 2024-07-16 ASSESSMENT — PAIN DESCRIPTION - DESCRIPTORS: DESCRIPTORS: ACHING

## 2024-07-16 ASSESSMENT — PAIN SCALES - GENERAL
PAINLEVEL_OUTOF10: 6
PAINLEVEL_OUTOF10: 0
PAINLEVEL_OUTOF10: 4

## 2024-07-16 ASSESSMENT — PAIN DESCRIPTION - ORIENTATION
ORIENTATION: LEFT
ORIENTATION: LEFT

## 2024-07-16 ASSESSMENT — PAIN DESCRIPTION - LOCATION
LOCATION: SHOULDER
LOCATION: LEG;ARM;PELVIS

## 2024-07-16 NOTE — PROGRESS NOTES
Physical Therapy    Received repeat PT order. Pt is on caseload and is being followed. Will continue.    Chantell Hammond PT

## 2024-07-16 NOTE — PLAN OF CARE
Problem: Physical Therapy - Adult  Goal: By Discharge: Performs mobility at highest level of function for planned discharge setting.  See evaluation for individualized goals.  Description: FUNCTIONAL STATUS PRIOR TO ADMISSION: Patient was independent and active without use of DME. The patient  was independent for basic and instrumental ADLs. Drives at baseline.    HOME SUPPORT PRIOR TO ADMISSION: The patient lived with son and DIL but did not require assistance. Patient is with her daughter mostly during the day, but sleeps at her son's house.    Physical Therapy Goals  Initiated 7/12/2024  1.  Patient will move from supine to sit and sit to supine, scoot up and down, and roll side to side in bed with contact guard assist within 7 day(s).    2.  Patient will perform sit to stand with contact guard assist within 7 day(s).  3.  Patient will transfer from bed to chair and chair to bed with contact guard assist using the least restrictive device within 7 day(s).  4.  Patient will ambulate with contact guard assist for 50 feet with the least restrictive device within 7 day(s).   5.  Patient will ascend/descend 3 stairs with 1 handrail(s) with minimal assistance within 7 day(s).   Outcome: Progressing   PHYSICAL THERAPY TREATMENT    Patient: Bebe Burnett (89 y.o. female)  Date: 7/16/2024  Diagnosis: Fracture [T14.8XXA]  Closed fracture of proximal end of left humerus, unspecified fracture morphology, initial encounter [S42.202A]  Closed fracture of sacrum, unspecified portion of sacrum, initial encounter ([facility]) [S32.10XA]  Closed fracture of multiple rami of left pubis, initial encounter ([facility]) [S32.592A] Fracture      Precautions: Weight Bearing, Fall Risk, Bed Alarm Right Lower Extremity Weight Bearing: Weight Bearing As Tolerated Left Lower Extremity Weight Bearing: Weight Bearing As Tolerated   Left Upper Extremity Weight Bearing: Non Weight Bearing              ASSESSMENT:  Patient continues to benefit from  skilled PT services and is slowly progressing towards goals. She was able to work on BSC transfers today. She is very slow in movement, does not rate pain but moves in small ranges to avoid significant hip range; LUE in sling NWB. WBAT BLE, decreased tolerance moreso on L. She is max A of 1 to come to the edge of the bed from HOB partially raised. She shows good static balance and fair/good dynamic. She stood initially with max A of 2 from the EOB. She took very slow, small steps to turn to BSC and needed assist for final part of turn to sit. When standing for hygiene, she did much better pulling up and holding on to bar on back of locked recliner and was able to stand with min A of 1. Bed was brought behind pt and pt needed max A to scoot toward HOB.   Pt was fully independent prior to her fall without device, she has good family support and is motivated to work toward PLOF. She has had some bradycardia during this admission and hx of afib and AAA. She would benefit from the consistency and medical oversight of inpatient rehab.          PLAN:  Patient continues to benefit from skilled intervention to address the above impairments.  Continue treatment per established plan of care.    Recommendation for discharge: (in order for the patient to meet his/her long term goals): Therapy 3 hours/day 5-7 days/week    Other factors to consider for discharge: patient's current support system is unable to meet their requirements for physical assistance, high risk for falls, not safe to be alone, concern for safely navigating or managing the home environment, and new weight bearing restrictions limiting activity or patient is unable to maintain    IF patient discharges home will need the following DME: continuing to assess with progress       SUBJECTIVE:   Patient stated, \"Don't go too fast.\"    OBJECTIVE DATA SUMMARY:   Critical Behavior:  Orientation  Orientation Level: Oriented to place;Oriented to situation;Oriented to  registered nurse    Patient Education  Education Given To: Patient  Education Provided: Role of Therapy;Plan of Care;Transfer Training;Fall Prevention Strategies  Education Method: Verbal;Demonstration;Teach Back  Barriers to Learning: Other (Comment) (anticipation of pain)  Education Outcome: Continued education needed      Katrin Hammond, PT  Minutes: 34

## 2024-07-16 NOTE — PROGRESS NOTES
End of Shift Note    Bedside shift change report given to BETHANY Way (oncoming nurse) by TRACEY SIMPSON RN (offgoing nurse).  Report included the following information SBAR REPORTS LIST: SBAR, Kardex, Intake/Output, MAR, and Recent Results    Shift worked:  1781-1212   Shift summary and any significant changes:    Patient had an uneventful night. She took 250ml of water already for today.    Concerns for physician to address:  Patient's arm is still swollen and is extending to the fingers     Zone phone for oncoming shift:   [phone]       Activity:  Activity: bed rest  Number times ambulated in hallways past shift: 0  Number of times OOB to chair past shift: 0    Cardiac:   Cardiac Monitoring: YES / NO: No        Access:  Current line(s): IV ACCESS: - Peripheral IV - site  R Cephalic, insertion date: 07/13/2024    Genitourinary:   Urinary status: Urinary status: Patient is voiding without difficulty., Patient is voiding with difficulty., Indwelling catheter is draining well., Indwelling catheter is not draining well., Urine appearance is bloody., Urine appearance is bloody with clots., Indwelling catheter has been removed and patient has not voided yet., Indwelling catheter has been removed and patient is voiding well.    Respiratory:   oxygen delivery: room air  Chronic home O2 use?: YES / NO: No  Incentive spirometer at bedside: YES / NO: No      GI:  Current diet:  DIET: regular  Passing flatus: YES / NO: Yes  Tolerating current diet: YES / NO: Yes      Pain Management:   Patient states pain is manageable on current regimen: YES / NO: Yes    Skin:    Interventions: CLEO SCALE: 17    Patient Safety:  Fall Score: FALL RISK ASSESSMENT: Not at risk for falls.  Interventions: Fall Interventions Provided: Implemented/recommended use of non-skid footwear, Implemented/recommended use of fall risk identification flag to all team members, Implemented/recommended assistive devices and encouraged their use,  Implemented/recommended resources for alarm system (personal alarm, bed alarm, call bell, etc.) , Implemented/recommended environmental changes (remove hazards, lower bed, improve lighting, etc.), and Implemented/recommended increased supervision/assistance      Length of Stay:  Expected LOS: 6  Actual LOS: 5      TRACEY SIMPSON RN

## 2024-07-16 NOTE — PLAN OF CARE
Problem: Pain  Goal: Verbalizes/displays adequate comfort level or baseline comfort level  Outcome: Progressing     Problem: Skin/Tissue Integrity  Goal: Absence of new skin breakdown  Description: 1.  Monitor for areas of redness and/or skin breakdown  2.  Assess vascular access sites hourly  3.  Every 4-6 hours minimum:  Change oxygen saturation probe site  4.  Every 4-6 hours:  If on nasal continuous positive airway pressure, respiratory therapy assess nares and determine need for appliance change or resting period.  Outcome: Progressing     Problem: ABCDS Injury Assessment  Goal: Absence of physical injury  Outcome: Progressing  Flowsheets (Taken 7/15/2024 1155 by Holli Burgos, RN)  Absence of Physical Injury: Implement safety measures based on patient assessment     Problem: Safety - Adult  Goal: Free from fall injury  Outcome: Progressing  Flowsheets (Taken 7/15/2024 1155 by Holli Burgos, RN)  Free From Fall Injury: Instruct family/caregiver on patient safety     Problem: Physical Therapy - Adult  Goal: By Discharge: Performs mobility at highest level of function for planned discharge setting.  See evaluation for individualized goals.  Description: FUNCTIONAL STATUS PRIOR TO ADMISSION: Patient was independent and active without use of DME. The patient  was independent for basic and instrumental ADLs. Drives at baseline.    HOME SUPPORT PRIOR TO ADMISSION: The patient lived with son and DIL but did not require assistance. Patient is with her daughter mostly during the day, but sleeps at her son's house.    Physical Therapy Goals  Initiated 7/12/2024  1.  Patient will move from supine to sit and sit to supine, scoot up and down, and roll side to side in bed with contact guard assist within 7 day(s).    2.  Patient will perform sit to stand with contact guard assist within 7 day(s).  3.  Patient will transfer from bed to chair and chair to bed with contact guard assist using the least restrictive device  within 7 day(s).  4.  Patient will ambulate with contact guard assist for 50 feet with the least restrictive device within 7 day(s).   5.  Patient will ascend/descend 3 stairs with 1 handrail(s) with minimal assistance within 7 day(s).   7/15/2024 1322 by Yu Mendoza PT  Outcome: Progressing     Problem: Occupational Therapy - Adult  Goal: By Discharge: Performs self-care activities at highest level of function for planned discharge setting.  See evaluation for individualized goals.  Description: FUNCTIONAL STATUS PRIOR TO ADMISSION:  Patient was ambulatory, performing all ADLs and IADLs.     HOME SUPPORT: Patient lived with her son but did not require assistance.      Occupational Therapy Goals  Initiated 7/12/2024   1.  Patient will don/doff arm sling prn at Standby Assist level within 7 days.  2.  Patient will perform upper-body dressing at Standby Assist within 7 days.   3.  Patient will perform lower-body dressing at Moderate Assist within 7 days.   4.  Patient will perform toilet transfer at Minimal Assist within 7 days.  5.  Patient will perform all aspects of toileting at Minimal Assist within 7 days.   6.  Patient will participate in therapeutic activity/exercise as cleared in prep for ADLs with Supervision within 7 days.   7.  Patient will perform seated grooming/self-feeding using compensatory strategies prn with Standby Assist within 7 days.   7/15/2024 1322 by Crystal Caraballo, OT  Outcome: Progressing     Problem: Discharge Planning  Goal: Discharge to home or other facility with appropriate resources  Outcome: Progressing

## 2024-07-16 NOTE — PROGRESS NOTES
[facility]     Progress Note      7/16/2024 3:26 PM  NAME: Bebe Burnett   MRN:  [id number]   Admit Diagnosis: Fracture [T14.8XXA]  Closed fracture of proximal end of left humerus, unspecified fracture morphology, initial encounter [S42.202A]  Closed fracture of sacrum, unspecified portion of sacrum, initial encounter ([facility]) [S32.10XA]  Closed fracture of multiple rami of left pubis, initial encounter ([facility]) [S32.592A]     Problem List:     --Paroxsymal atrial fibrillation, currently in sinus rhythm/sinus bradycardia.  First degree AV block.  Sinus with blocked PAC's on admission ECG.  Doesn't need rate control prophylaxis for atrial fibrillation.  No unstable arrhythmias.  No evidence of unstable coronary issue.  No ischemia or infarct on nuclear test in 4/2021.  No history of CHF or evidence of decompensated CHF.  Echo EF 55-60%, mild LVH, mild AR/MR/TR.  No preload dependent cardiac disease.  Hypotension likely secondary to pain medication.  Thoracic aortic aneurysm without rupture.  PVD.  Full code.      Assessment/Plan:     --I feel the risk is acceptable to proceed to orthopedic surgery.  Eliquis is being held.  She met criteria for reduced dosing based on age, weight.  BP meds are being held.  Agree with IVF, she'll tolerate this.     All questions answered for the patient and family.  Patient is OK with [facility] Ordoñez [phone] getting cardiac updates if needed.     7/14 update:  I reviewed the tele.  No bradycardia since 7/12.  SINUS RHYTHM with PAC's, first degree AV block.  BP better.  Remains off Eliquis.  Defer to ortho for the plan.     7/15 update:  No change.  Antihypertensives still held.  Question if all of these meds are needed.    7/16/2024 update: Patient continues to be normotensive off her meds.(Clonidine/Lisinopril) Weigh patient please. I and O negative last 24. Continues with hyponatemia - Would avoid HCTZ!! Remains anemic. Awaiting placement        [x]       High

## 2024-07-16 NOTE — PROGRESS NOTES
Patient transferred from [facility] at shift change. Q6hrs sodium last completed at 04:04 prior to patient transfer. Patient oriented to room, call and other safety precautions. Bed alarm activated for safety.  Bedside shift change report given to BETHANY Alvarado (oncoming nurse) by BETHANY Ng (offgoing nurse). Report included the following information Nurse Handoff Report, Index, ED Encounter Summary, ED SBAR, Adult Overview, Intake/Output, MAR, Recent Results, Med Rec Status, Quality Measures, and Neuro Assessment.  Patient reported pain in affected site and pain medication administered as ordered. Patient awake , alert and oriented during bedside shift change report. Vitals completed WNL.Night shift RN aware of transfer orders.

## 2024-07-16 NOTE — PROGRESS NOTES
Occupational Therapy  Duplicate orders received.  Pt is already on OT Caseload.  Will continue to follow.

## 2024-07-16 NOTE — PLAN OF CARE
Problem: Pain  Goal: Verbalizes/displays adequate comfort level or baseline comfort level  7/16/2024 1312 by Katelyn Sharp RN  Outcome: Progressing  7/16/2024 0152 by Jenny Villegas RN  Outcome: Progressing     Problem: Skin/Tissue Integrity  Goal: Absence of new skin breakdown  Description: 1.  Monitor for areas of redness and/or skin breakdown  2.  Assess vascular access sites hourly  3.  Every 4-6 hours minimum:  Change oxygen saturation probe site  4.  Every 4-6 hours:  If on nasal continuous positive airway pressure, respiratory therapy assess nares and determine need for appliance change or resting period.  7/16/2024 1312 by Katelyn Sharp RN  Outcome: Progressing  7/16/2024 0152 by Jenny Villegas RN  Outcome: Progressing     Problem: ABCDS Injury Assessment  Goal: Absence of physical injury  7/16/2024 1312 by Katelyn Sharp RN  Outcome: Progressing  7/16/2024 0152 by Jenny Villegas RN  Outcome: Progressing  Flowsheets (Taken 7/15/2024 1155 by Holli Burgos, RN)  Absence of Physical Injury: Implement safety measures based on patient assessment     Problem: Safety - Adult  Goal: Free from fall injury  7/16/2024 1312 by Katelyn Sharp RN  Outcome: Progressing  7/16/2024 0152 by Jenny Villegas RN  Outcome: Progressing  Flowsheets (Taken 7/15/2024 1155 by Holli Burgos, RN)  Free From Fall Injury: Instruct family/caregiver on patient safety     Problem: Discharge Planning  Goal: Discharge to home or other facility with appropriate resources  7/16/2024 1312 by Katelyn Sharp RN  Outcome: Progressing  7/16/2024 0152 by Jenny Villegas RN  Outcome: Progressing

## 2024-07-16 NOTE — PROGRESS NOTES
Hospitalist Progress Note    NAME: Bebe Burnett   :  10/21/1934   MRN:  [id number]            Subjective:     Chief Complaint / Reason for Physician Visit Fall  Patient seen and evaluated at bedside, overnight events reviewed.  Discussed with RN events overnight. Tolerating PT/OT     Review of Systems:  Symptom Y/N Comments  Symptom Y/N Comments   Fever/Chills N   Chest Pain N    Poor Appetite Y   Edema N    Cough N   Abdominal Pain N    Sputum N   Joint Pain N    SOB/WYATT N   Pruritis/Rash N    Nausea/vomit N   Tolerating PT/OT NA    Diarrhea N   Tolerating Diet Y    Constipation N   Other       Could NOT obtain due to:      Objective:     VITALS:   Last 24hrs VS reviewed since prior progress note. Most recent are:  [unfilled]    Intake/Output Summary (Last 24 hours) at 2024 1241  Last data filed at 2024 0602  Gross per 24 hour   Intake 790 ml   Output 920 ml   Net -130 ml        PHYSICAL EXAM:  General: Patient appears comfortable    EENT:  EOMI. Anicteric sclerae. MMM  Resp:  CTA bilaterally, no wheezing or rales.  No accessory muscle use  CV:  Regular  rhythm, s1/s2 no m/r/g No edema  GI:  Soft, Non distended, Non tender.  +Bowel sounds  Neurologic:  Alert and oriented X 3, normal speech,   Psych:   Good insight. Not anxious nor agitated  Skin:  No rashes.  No jaundice    Procedures: see electronic medical records for all procedures/Xrays and details which were not copied into this note but were reviewed prior to creation of Plan.      LABS:  I reviewed today's most current labs and imaging studies.  Pertinent labs include:  Recent Labs     24  0509 07/15/24  0404 24  0354   WBC 7.9 8.4 7.7   HGB 8.4* 8.2* 8.4*   HCT 26.0* 25.3* 25.2*   * 159 173     Recent Labs     24  0509 24  1349 07/15/24  0404 07/15/24  2101 24  0354 24  1020   *   < > 131* 128* 130* 130*   K 4.8  --  4.8  --  4.7  --    CL 99  --  99  --  98  --    CO2 29  --  28  --  26  --

## 2024-07-16 NOTE — ADT AUTH CERT
Progress Notes by Giorgi Pyle MD at 7/15/2024  1:21 PM    Author: Giorgi Pyle MD Service: Cardiology Author Type: Physician   Filed: 7/15/2024  2:31 PM Date of Service: 7/15/2024  1:21 PM Status: Signed   : Giorgi Pyle MD (Physician)                               EP/ ARRHYTHMIA Progress Note     Patient ID:  Patient: Bebe Burnett  MRN: [id number]  Age: 89 y.o.  : 10/21/1934     Date of  Admission: 2024  2:54 PM          PCP:  Patricia Hoffmann APRN - NP  Usual cardiologist:  Dionte Torrez MD     Assessment:   Paroxsymal atrial fibrillation, currently in sinus rhythm/sinus bradycardia.  First degree AV block.  Sinus with blocked PAC's on admission ECG.  Doesn't need rate control prophylaxis for atrial fibrillation.  No unstable arrhythmias.  No evidence of unstable coronary issue.  No ischemia or infarct on nuclear test in 2021.  No history of CHF or evidence of decompensated CHF.  Echo EF 55-60%, mild LVH, mild AR/MR/TR.  No preload dependent cardiac disease.  Hypotension likely secondary to pain medication.  Thoracic aortic aneurysm without rupture.  PVD.  Full code.     Plan:      I feel the risk is acceptable to proceed to orthopedic surgery.  Eliquis is being held.  She met criteria for reduced dosing based on age, weight.  BP meds are being held.  Agree with IVF, she'll tolerate this.     All questions answered for the patient and family.  Patient is OK with Rodolfo Ordoñez [phone] getting cardiac updates if needed.      update:  I reviewed the tele.  No bradycardia since .  SINUS RHYTHM with PAC's, first degree AV block.  BP better.  Remains off Eliquis.  Defer to ortho for the plan.     7/15 update:  No change.  Antihypertensives still held.  Question if all of these meds are needed.      [x]       High complexity decision making was performed in this patient at high risk for decompensation     Bebe Burnett is a 89 y.o. female with a  history of a mechanical ground level fall found to have L shoulder dislocation and pelvic fractures.  Anticipated L shoulder surgery with Dr. Mathis.  She denies chest, jaw, neck, shoulder, or arm pain with exertion.  No CHF symptoms.  No TIA or stroke symptoms.     TODAY:  L arm in a sling.  She denies chest, jaw, neck, shoulder, or arm pain with exertion.  No CHF symptoms.  No TIA or stroke symptoms.           Allergies   Allergen Reactions    Luouq-Notnd-Zugvvga-Pramoxine Other (See Comments)       Unknown antibiotic begins with a \"C\"                   Current Facility-Administered Medications   Medication Dose Route Frequency    lidocaine 4 % external patch 2 patch  2 patch TransDERmal Daily    calcium carbonate (TUMS) chewable tablet 500 mg  500 mg Oral TID PRN    cyclobenzaprine (FLEXERIL) tablet 10 mg  10 mg Oral TID PRN    acetaminophen (TYLENOL) tablet 650 mg  650 mg Oral Q4H PRN    sodium chloride flush 0.9 % injection 5-40 mL  5-40 mL IntraVENous 2 times per day    sodium chloride flush 0.9 % injection 5-40 mL  5-40 mL IntraVENous PRN    0.9 % sodium chloride infusion   IntraVENous PRN    ondansetron (ZOFRAN-ODT) disintegrating tablet 4 mg  4 mg Oral Q8H PRN     Or    ondansetron (ZOFRAN) injection 4 mg  4 mg IntraVENous Q6H PRN    acetaminophen (TYLENOL) tablet 650 mg  650 mg Oral Q6H PRN     Or    acetaminophen (TYLENOL) suppository 650 mg  650 mg Rectal Q6H PRN    [Held by provider] amLODIPine (NORVASC) tablet 5 mg  5 mg Oral Daily    atorvastatin (LIPITOR) tablet 40 mg  40 mg Oral Nightly    brimonidine (ALPHAGAN) 0.2 % ophthalmic solution 1 drop  1 drop Both Eyes BID    [Held by provider] cloNIDine (CATAPRES) tablet 0.1 mg  0.1 mg Oral BID    latanoprost (XALATAN) 0.005 % ophthalmic solution 1 drop  1 drop Both Eyes Nightly    levothyroxine (SYNTHROID) tablet 88 mcg  88 mcg Oral Daily    sertraline (ZOLOFT) tablet 25 mg  25 mg Oral Nightly    enoxaparin (LOVENOX) injection 60 mg  1 mg/kg SubCUTAneous    Giorgi Pyle MD 07/15/24 1:21 PM          Electronically signed by Giorgi Pyle MD at 7/15/2024  2:31 PM     Progress Notes by Maximo Murphy MD at 2024 12:41 PM    Author: Maximo Murphy MD Service: Internal Medicine Author Type: Physician   Filed: 2024 12:46 PM Date of Service: 2024 12:41 PM Status: Signed   : Maximo Murphy MD (Physician)   Hospitalist Progress Note     NAME:            Bebe Burnett   :               10/21/1934   MRN:               [id number]              Subjective:      Chief Complaint / Reason for Physician Visit Fall  Patient seen and evaluated at bedside, overnight events reviewed.  Discussed with RN events overnight. Tolerating PT/OT      Review of Systems:            Symptom Y/N Comments   Symptom Y/N Comments   Fever/Chills N     Chest Pain N     Poor Appetite Y     Edema N     Cough N     Abdominal Pain N     Sputum N     Joint Pain N     SOB/WYATT N     Pruritis/Rash N     Nausea/vomit N     Tolerating PT/OT NA     Diarrhea N     Tolerating Diet Y     Constipation N     Other          Could NOT obtain due to:        Objective:      VITALS:   Last 24hrs VS reviewed since prior progress note. Most recent are:  [unfilled]     Intake/Output Summary (Last 24 hours) at 2024 1241  Last data filed at 2024 0602      Gross per 24 hour   Intake 790 ml   Output 920 ml   Net -130 ml         PHYSICAL EXAM:  General:          Patient appears comfortable    EENT:              EOMI. Anicteric sclerae. MMM  Resp:               CTA bilaterally, no wheezing or rales.  No accessory muscle use  CV:                  Regular  rhythm, s1/s2 no m/r/g No edema  GI:                   Soft, Non distended, Non tender.  +Bowel sounds  Neurologic:       Alert and oriented X 3, normal speech,   Psych:   Good insight. Not anxious nor agitated  Skin:                No rashes.  No jaundice     Procedures: see electronic medical records for all procedures/Xrays and

## 2024-07-16 NOTE — PROGRESS NOTES
NAME: Bebe Burnett        :  10/21/1934        MRN:  [id number]                   Assessment   :                                               Plan:  Hyponatremia, chronic  HTN --> now hypotension  DM2  Afib  S/p fall  Pubic fx/left humerus fx  Bradycardia Sodium  ~ 130  Thiazide contraindiated  For surgery   Continue monitoring BMP for acute SIADH which she is At high risk  Spoke to son         Subjective:     Chief Complaint:   poor historian.  Resting.  Spoke to son who is by bedside    Review of Systems:    Symptom Y/N Comments  Symptom Y/N Comments   Fever/Chills    Chest Pain     Poor Appetite    Edema     Cough    Abdominal Pain     Sputum    Joint Pain     SOB/WYATT    Pruritis/Rash     Nausea/vomit    Tolerating PT/OT     Diarrhea    Tolerating Diet     Constipation    Other       Could not obtain due to: lethargy     Objective:     VITALS:   Last 24hrs VS reviewed since prior progress note. Most recent are:  Vitals:    24 1041   BP:    Pulse:    Resp: 18   Temp:    SpO2:        Intake/Output Summary (Last 24 hours) at 2024 1636  Last data filed at 2024 0602  Gross per 24 hour   Intake 670 ml   Output 920 ml   Net -250 ml      Telemetry Reviewed:     PHYSICAL EXAM:  General: NAD  No edema      Lab Data Reviewed: (see below)    Medications Reviewed: (see below)    PMH/SH reviewed - no change compared to H&P  ________________________________________________________________________  Care Plan discussed with:  Patient x    Family  x    RN     Care Manager                    Consultant:          Comments   >50% of visit spent in counseling and coordination of care       ________________________________________________________________________  Marvin Duran MD     Procedures: see electronic medical records for all procedures/Xrays and details which  were not copied into this note but were reviewed prior to creation

## 2024-07-17 PROCEDURE — 6370000000 HC RX 637 (ALT 250 FOR IP): Performed by: STUDENT IN AN ORGANIZED HEALTH CARE EDUCATION/TRAINING PROGRAM

## 2024-07-17 PROCEDURE — 97535 SELF CARE MNGMENT TRAINING: CPT | Performed by: OCCUPATIONAL THERAPIST

## 2024-07-17 PROCEDURE — 6360000002 HC RX W HCPCS: Performed by: STUDENT IN AN ORGANIZED HEALTH CARE EDUCATION/TRAINING PROGRAM

## 2024-07-17 PROCEDURE — 6370000000 HC RX 637 (ALT 250 FOR IP): Performed by: INTERNAL MEDICINE

## 2024-07-17 PROCEDURE — 97110 THERAPEUTIC EXERCISES: CPT

## 2024-07-17 PROCEDURE — 1100000000 HC RM PRIVATE

## 2024-07-17 PROCEDURE — 6370000000 HC RX 637 (ALT 250 FOR IP): Performed by: GENERAL ACUTE CARE HOSPITAL

## 2024-07-17 PROCEDURE — 2580000003 HC RX 258: Performed by: STUDENT IN AN ORGANIZED HEALTH CARE EDUCATION/TRAINING PROGRAM

## 2024-07-17 RX ORDER — LIDOCAINE 4 G/G
2 PATCH TOPICAL DAILY PRN
Qty: 15 EACH | Refills: 1 | Status: SHIPPED | OUTPATIENT
Start: 2024-07-17

## 2024-07-17 RX ORDER — HYDROCODONE BITARTRATE AND ACETAMINOPHEN 10; 325 MG/1; MG/1
1 TABLET ORAL EVERY 6 HOURS PRN
Qty: 10 TABLET | Refills: 0 | Status: SHIPPED | OUTPATIENT
Start: 2024-07-17 | End: 2024-07-19

## 2024-07-17 RX ORDER — SENNA AND DOCUSATE SODIUM 50; 8.6 MG/1; MG/1
2 TABLET, FILM COATED ORAL 2 TIMES DAILY
Qty: 60 TABLET | Refills: 0 | Status: SHIPPED | OUTPATIENT
Start: 2024-07-17

## 2024-07-17 RX ORDER — POLYETHYLENE GLYCOL 3350 17 G/17G
17 POWDER, FOR SOLUTION ORAL DAILY PRN
Qty: 16 EACH | Refills: 1 | Status: SHIPPED | OUTPATIENT
Start: 2024-07-17 | End: 2024-08-16

## 2024-07-17 RX ADMIN — ATORVASTATIN CALCIUM 40 MG: 40 TABLET, FILM COATED ORAL at 21:25

## 2024-07-17 RX ADMIN — SERTRALINE HYDROCHLORIDE 25 MG: 25 TABLET ORAL at 21:25

## 2024-07-17 RX ADMIN — BRIMONIDINE TARTRATE 1 DROP: 2 SOLUTION OPHTHALMIC at 21:26

## 2024-07-17 RX ADMIN — LATANOPROST 1 DROP: 50 SOLUTION OPHTHALMIC at 21:26

## 2024-07-17 RX ADMIN — ACETAMINOPHEN 650 MG: 325 TABLET ORAL at 17:46

## 2024-07-17 RX ADMIN — MORPHINE SULFATE 2 MG: 2 INJECTION, SOLUTION INTRAMUSCULAR; INTRAVENOUS at 21:25

## 2024-07-17 RX ADMIN — MORPHINE SULFATE 2 MG: 2 INJECTION, SOLUTION INTRAMUSCULAR; INTRAVENOUS at 13:09

## 2024-07-17 RX ADMIN — LEVOTHYROXINE SODIUM 88 MCG: 88 TABLET ORAL at 06:36

## 2024-07-17 RX ADMIN — BRIMONIDINE TARTRATE 1 DROP: 2 SOLUTION OPHTHALMIC at 10:33

## 2024-07-17 RX ADMIN — SODIUM CHLORIDE, PRESERVATIVE FREE 10 ML: 5 INJECTION INTRAVENOUS at 10:27

## 2024-07-17 RX ADMIN — SODIUM CHLORIDE, PRESERVATIVE FREE 10 ML: 5 INJECTION INTRAVENOUS at 21:26

## 2024-07-17 RX ADMIN — ENOXAPARIN SODIUM 60 MG: 100 INJECTION SUBCUTANEOUS at 09:28

## 2024-07-17 RX ADMIN — POLYETHYLENE GLYCOL 3350 17 G: 17 POWDER, FOR SOLUTION ORAL at 09:28

## 2024-07-17 RX ADMIN — APIXABAN 2.5 MG: 2.5 TABLET, FILM COATED ORAL at 21:25

## 2024-07-17 ASSESSMENT — PAIN SCALES - GENERAL
PAINLEVEL_OUTOF10: 0
PAINLEVEL_OUTOF10: 0
PAINLEVEL_OUTOF10: 8
PAINLEVEL_OUTOF10: 3
PAINLEVEL_OUTOF10: 1
PAINLEVEL_OUTOF10: 6
PAINLEVEL_OUTOF10: 4
PAINLEVEL_OUTOF10: 9

## 2024-07-17 ASSESSMENT — PAIN DESCRIPTION - ORIENTATION
ORIENTATION: RIGHT;LEFT
ORIENTATION: LEFT

## 2024-07-17 ASSESSMENT — PAIN DESCRIPTION - DESCRIPTORS: DESCRIPTORS: ACHING

## 2024-07-17 ASSESSMENT — PAIN DESCRIPTION - LOCATION
LOCATION: HEAD
LOCATION: HEAD
LOCATION: ARM

## 2024-07-17 NOTE — PROGRESS NOTES
Spiritual Care Assessment/Progress Note  [facility]    Name: Bebe Burnett MRN: [id number]    Age: 89 y.o.     Sex: female   Language: English     Date: 7/17/2024            Total Time Calculated: 10 min              Spiritual Assessment begun in [facility] 3 MED TELE  Service Provided For: Patient  Referral/Consult From: Rounding  Encounter Overview/Reason: Initial Encounter    Spiritual beliefs:      [x] Involved in a narinder tradition/spiritual practice: Protestant     [x] Supported by a narinder community: [employer] witness     [] Claims no spiritual orientation:      [] Seeking spiritual identity:           [] Adheres to an individual form of spirituality:      [] Not able to assess:                Identified resources for coping and support system:   Support System: Children, Family members, Mormonism/narinder community       [] Prayer                  [] Devotional reading               [] Music                  [] Guided Imagery     [] Pet visits                                        [] Other: (COMMENT)     Specific area/focus of visit   Encounter:    Crisis:    Spiritual/Emotional needs:    Ritual, Rites and Sacraments:    Grief, Loss, and Adjustments:    Ethics/Mediation:    Behavioral Health:    Palliative Care:    Advance Care Planning:      Plan/Referrals: Other (Comment) (Contact Spiritual Care for further consults.)    Narrative:  visited patient on rounds in Med Tele Unit.  Patient was present during the visit.  Patient shared that was being hospitalized for a fall about a week ago. Patient indicated that she was doing well and had no complaints. Patient talked about her large support system - 9 children! - and supportive friends from her narinder community. Patient stated that she was looking forward to returning home to live with her son and daughter-in-law.  provided active listening and affirmations throughout the visit. Advised of further  availability.

## 2024-07-17 NOTE — PLAN OF CARE
Problem: Pain  Goal: Verbalizes/displays adequate comfort level or baseline comfort level  7/17/2024 0024 by Jenny Villegas RN  Outcome: Progressing  7/16/2024 1312 by Katelyn Sharp RN  Outcome: Progressing     Problem: Skin/Tissue Integrity  Goal: Absence of new skin breakdown  Description: 1.  Monitor for areas of redness and/or skin breakdown  2.  Assess vascular access sites hourly  3.  Every 4-6 hours minimum:  Change oxygen saturation probe site  4.  Every 4-6 hours:  If on nasal continuous positive airway pressure, respiratory therapy assess nares and determine need for appliance change or resting period.  7/17/2024 0024 by Jenny Villegas RN  Outcome: Progressing  7/16/2024 1312 by Katelyn Sharp RN  Outcome: Progressing     Problem: ABCDS Injury Assessment  Goal: Absence of physical injury  7/17/2024 0024 by Jenny Villegas RN  Outcome: Progressing  7/16/2024 1312 by Katelyn Sharp RN  Outcome: Progressing     Problem: Safety - Adult  Goal: Free from fall injury  7/17/2024 0024 by Jenny Villegas RN  Outcome: Progressing  7/16/2024 1312 by Katelyn Sharp RN  Outcome: Progressing     Problem: Physical Therapy - Adult  Goal: By Discharge: Performs mobility at highest level of function for planned discharge setting.  See evaluation for individualized goals.  Description: FUNCTIONAL STATUS PRIOR TO ADMISSION: Patient was independent and active without use of DME. The patient  was independent for basic and instrumental ADLs. Drives at baseline.    HOME SUPPORT PRIOR TO ADMISSION: The patient lived with son and DIL but did not require assistance. Patient is with her daughter mostly during the day, but sleeps at her son's house.    Physical Therapy Goals  Initiated 7/12/2024  1.  Patient will move from supine to sit and sit to supine, scoot up and down, and roll side to side in bed with contact guard assist within 7 day(s).    2.  Patient will perform sit to stand with contact guard assist within 7 day(s).  3.  Patient will  transfer from bed to chair and chair to bed with contact guard assist using the least restrictive device within 7 day(s).  4.  Patient will ambulate with contact guard assist for 50 feet with the least restrictive device within 7 day(s).   5.  Patient will ascend/descend 3 stairs with 1 handrail(s) with minimal assistance within 7 day(s).   7/16/2024 1716 by Katrin Hammond, PT  Outcome: Progressing     Problem: Discharge Planning  Goal: Discharge to home or other facility with appropriate resources  7/17/2024 0024 by Jenny Villegas, RN  Outcome: Progressing  7/16/2024 1312 by Katelyn Sharp, RN  Outcome: Progressing

## 2024-07-17 NOTE — PROGRESS NOTES
[facility]     Progress Note      7/17/2024 9:48 AM  NAME: Bebe Burnett   MRN:  [id number]   Admit Diagnosis: Fracture [T14.8XXA]  Closed fracture of proximal end of left humerus, unspecified fracture morphology, initial encounter [S42.202A]  Closed fracture of sacrum, unspecified portion of sacrum, initial encounter ([facility]) [S32.10XA]  Closed fracture of multiple rami of left pubis, initial encounter ([facility]) [S32.592A]     Problem List:     --Paroxsymal atrial fibrillation, currently in sinus rhythm/sinus bradycardia.  First degree AV block.  Sinus with blocked PAC's on admission ECG.  Doesn't need rate control prophylaxis for atrial fibrillation.  No unstable arrhythmias.  No evidence of unstable coronary issue.  No ischemia or infarct on nuclear test in 4/2021.  No history of CHF or evidence of decompensated CHF.  Echo EF 55-60%, mild LVH, mild AR/MR/TR.  No preload dependent cardiac disease.  Hypotension likely secondary to pain medication.  Thoracic aortic aneurysm without rupture.  PVD.  Full code.      Assessment/Plan:     --I feel the risk is acceptable to proceed to orthopedic surgery.  Eliquis is being held.  She met criteria for reduced dosing based on age, weight.  BP meds are being held.  Agree with IVF, she'll tolerate this.     All questions answered for the patient and family.  Patient is OK with Rodolfo Ordoñez [phone] getting cardiac updates if needed.     7/14 update:  I reviewed the tele.  No bradycardia since 7/12.  SINUS RHYTHM with PAC's, first degree AV block.  BP better.  Remains off Eliquis.  Defer to ortho for the plan.     7/15 update:  No change.  Antihypertensives still held.  Question if all of these meds are needed.    7/16 update: Patient continues to be normotensive off her meds.(Clonidine/Lisinopril) Weigh patient please. I and O negative last 24. Continues with hyponatemia - Would avoid HCTZ!! Remains anemic. Awaiting placement    7/17 update: Patient  sounds are active.  Extremities: No edema bilaterally.  Skin: Warm and dry.    PMH/SH reviewed - no change compared to H&P    Data Review    Telemetry: normal sinus rhythm     Lab Data Personally Reviewed:    Recent Labs     07/15/24  0404 07/16/24  0354   WBC 8.4 7.7   HGB 8.2* 8.4*   HCT 25.3* 25.2*    173       No results for input(s): \"INR\", \"APTT\" in the last 72 hours.    Invalid input(s): \"PTP\"   Recent Labs     07/15/24  0404 07/15/24  2101 07/16/24  0354 07/16/24  1020   * 128* 130* 130*   K 4.8  --  4.7  --    CL 99  --  98  --    CO2 28  --  26  --    BUN 15  --  13  --        No results for input(s): \"CPK\" in the last 72 hours.    Invalid input(s): \"CPKMB\", \"CKNDX\", \"TROIQ\"  No results found for: \"CHOL\", \"CHLST\", \"CHOLV\", \"HDL\", \"HDLC\", \"LDL\"    No results for input(s): \"TP\", \"GLOB\", \"GGT\" in the last 72 hours.    Invalid input(s): \"SGOT\", \"GPT\", \"AP\", \"TBIL\", \"ALB\", \"AML\", \"AMYP\", \"LPSE\", \"HLPSE\"  No results for input(s): \"PH\", \"PCO2\", \"PO2\" in the last 72 hours.    Medications Personally Reviewed:    Current Facility-Administered Medications   Medication Dose Route Frequency    [Held by provider] lisinopril (PRINIVIL;ZESTRIL) tablet 20 mg  20 mg Oral Daily    acetaminophen (TYLENOL) tablet 650 mg  650 mg Oral Q4H PRN    Or    acetaminophen (TYLENOL) suppository 650 mg  650 mg Rectal Q6H PRN    lidocaine 4 % external patch 2 patch  2 patch TransDERmal Daily    calcium carbonate (TUMS) chewable tablet 500 mg  500 mg Oral TID PRN    cyclobenzaprine (FLEXERIL) tablet 10 mg  10 mg Oral TID PRN    sodium chloride flush 0.9 % injection 5-40 mL  5-40 mL IntraVENous 2 times per day    sodium chloride flush 0.9 % injection 5-40 mL  5-40 mL IntraVENous PRN    0.9 % sodium chloride infusion   IntraVENous PRN    ondansetron (ZOFRAN-ODT) disintegrating tablet 4 mg  4 mg Oral Q8H PRN    Or    ondansetron (ZOFRAN) injection 4 mg  4 mg IntraVENous Q6H PRN    atorvastatin (LIPITOR) tablet 40 mg  40 mg Oral  Nightly    brimonidine (ALPHAGAN) 0.2 % ophthalmic solution 1 drop  1 drop Both Eyes BID    latanoprost (XALATAN) 0.005 % ophthalmic solution 1 drop  1 drop Both Eyes Nightly    levothyroxine (SYNTHROID) tablet 88 mcg  88 mcg Oral Daily    sertraline (ZOLOFT) tablet 25 mg  25 mg Oral Nightly    enoxaparin (LOVENOX) injection 60 mg  1 mg/kg SubCUTAneous BID    polyethylene glycol (GLYCOLAX) packet 17 g  17 g Oral Daily    HYDROcodone-acetaminophen (NORCO)  MG per tablet 1 tablet  1 tablet Oral Q6H PRN    morphine (PF) injection 2 mg  2 mg IntraVENous Q4H PRN         Giorgi Pyle MD

## 2024-07-17 NOTE — PROGRESS NOTES
End of Shift Note    Bedside shift change report given to            (oncoming nurse) by TRACEY SIMPSON RN (offgoing nurse).  Report included the following information SBAR REPORTS LIST: SBAR, Kardex, Intake/Output, and MAR    Shift worked:  [id number]     Shift summary and any significant changes:     Patient,  c/o constant pain on the left leg. Norco was given as  prescribed.     Concerns for physician to address:  None       Zone phone for oncoming shift:          Activity:  Activity: bed rest  Cardiac:   Cardiac Monitoring: YES / NO: No        Access:  Current line(s): IV ACCESS: - Peripheral IV - site  R Antecubital, insertion date: 7/13/2024    Genitourinary:   Urinary status: Urinary status: Patient is voiding without difficulty.    Respiratory:   oxygen delivery: room air  Chronic home O2 use?: YES / NO: No        Pain Management:   Patient states pain is manageable on current regimen: Yes      Skin:    Interventions: CLEO SCALE: 17    Patient Safety:  Fall Score: FALL RISK ASSESSMENT: Not at risk for falls.  Interventions: Fall Interventions Provided: Implemented/recommended use of non-skid footwear, Implemented/recommended use of fall risk identification flag to all team members, Implemented/recommended assistive devices and encouraged their use, Implemented/recommended resources for alarm system (personal alarm, bed alarm, call bell, etc.) , Implemented/recommended environmental changes (remove hazards, lower bed, improve lighting, etc.), and Implemented/recommended increased supervision/assistance      Length of Stay:  Expected LOS: 7  Actual LOS: 6      TRACEY SIMPSON RN

## 2024-07-17 NOTE — PROGRESS NOTES
[facility]     Progress Note      7/17/2024 9:17 AM  NAME: Bebe Burnett   MRN:  [id number]   Admit Diagnosis: Fracture [T14.8XXA]  Closed fracture of proximal end of left humerus, unspecified fracture morphology, initial encounter [S42.202A]  Closed fracture of sacrum, unspecified portion of sacrum, initial encounter ([facility]) [S32.10XA]  Closed fracture of multiple rami of left pubis, initial encounter ([facility]) [S32.592A]     Problem List:     --Paroxsymal atrial fibrillation, currently in sinus rhythm/sinus bradycardia.  First degree AV block.  Sinus with blocked PAC's on admission ECG.  Doesn't need rate control prophylaxis for atrial fibrillation.  No unstable arrhythmias.  No evidence of unstable coronary issue.  No ischemia or infarct on nuclear test in 4/2021.  No history of CHF or evidence of decompensated CHF.  Echo EF 55-60%, mild LVH, mild AR/MR/TR.  No preload dependent cardiac disease.  Hypotension likely secondary to pain medication.  Thoracic aortic aneurysm without rupture.  PVD.  Full code.      Assessment/Plan:     --I feel the risk is acceptable to proceed to orthopedic surgery.  Eliquis is being held.  She met criteria for reduced dosing based on age, weight.  BP meds are being held.  Agree with IVF, she'll tolerate this.     All questions answered for the patient and family.  Patient is OK with Rodolfo Ordoñez [phone] getting cardiac updates if needed.     7/14 update:  I reviewed the tele.  No bradycardia since 7/12.  SINUS RHYTHM with PAC's, first degree AV block.  BP better.  Remains off Eliquis.  Defer to ortho for the plan.     7/15 update:  No change.  Antihypertensives still held.  Question if all of these meds are needed.    7/16/2024 update: Patient continues to be normotensive off her meds.(Clonidine/Lisinopril) Weigh patient please. I and O negative last 24. Continues with hyponatemia - Would avoid HCTZ!! Remains anemic. Awaiting placement    7/17/2024 update:  reviewed - no change compared to H&P    Data Review    Telemetry: normal sinus rhythm     Lab Data Personally Reviewed:    Recent Labs     07/15/24  0404 07/16/24  0354   WBC 8.4 7.7   HGB 8.2* 8.4*   HCT 25.3* 25.2*    173       No results for input(s): \"INR\", \"APTT\" in the last 72 hours.    Invalid input(s): \"PTP\"   Recent Labs     07/15/24  0404 07/15/24  2101 07/16/24  0354 07/16/24  1020   * 128* 130* 130*   K 4.8  --  4.7  --    CL 99  --  98  --    CO2 28  --  26  --    BUN 15  --  13  --        No results for input(s): \"CPK\" in the last 72 hours.    Invalid input(s): \"CPKMB\", \"CKNDX\", \"TROIQ\"  No results found for: \"CHOL\", \"CHLST\", \"CHOLV\", \"HDL\", \"HDLC\", \"LDL\"    No results for input(s): \"TP\", \"GLOB\", \"GGT\" in the last 72 hours.    Invalid input(s): \"SGOT\", \"GPT\", \"AP\", \"TBIL\", \"ALB\", \"AML\", \"AMYP\", \"LPSE\", \"HLPSE\"  No results for input(s): \"PH\", \"PCO2\", \"PO2\" in the last 72 hours.    Medications Personally Reviewed:    Current Facility-Administered Medications   Medication Dose Route Frequency    [Held by provider] cloNIDine (CATAPRES) tablet 0.1 mg  0.1 mg Oral BID    [Held by provider] lisinopril (PRINIVIL;ZESTRIL) tablet 20 mg  20 mg Oral Daily    And    [Held by provider] hydroCHLOROthiazide (HYDRODIURIL) tablet 12.5 mg  12.5 mg Oral Daily    acetaminophen (TYLENOL) tablet 650 mg  650 mg Oral Q4H PRN    Or    acetaminophen (TYLENOL) suppository 650 mg  650 mg Rectal Q6H PRN    lidocaine 4 % external patch 2 patch  2 patch TransDERmal Daily    calcium carbonate (TUMS) chewable tablet 500 mg  500 mg Oral TID PRN    cyclobenzaprine (FLEXERIL) tablet 10 mg  10 mg Oral TID PRN    sodium chloride flush 0.9 % injection 5-40 mL  5-40 mL IntraVENous 2 times per day    sodium chloride flush 0.9 % injection 5-40 mL  5-40 mL IntraVENous PRN    0.9 % sodium chloride infusion   IntraVENous PRN    ondansetron (ZOFRAN-ODT) disintegrating tablet 4 mg  4 mg Oral Q8H PRN    Or    ondansetron (ZOFRAN)

## 2024-07-17 NOTE — DISCHARGE SUMMARY
Discharge Summary    Name: Bebe Burnett  [id number]  YOB: 1934 (Age: 89 y.o.)   Date of Admission: 7/11/2024  Date of Discharge: 7/17/2024  Attending Physician: Elba Singh MD      Discharge Diagnosis:   Acute left superior and inferior pubic ramus fractures:  Subacute to chronic appearing bilateral sacral insufficiency fractures:  Bradycardia  Hyperlipidemia:  Paroxysmal atrial fibrillation:  Coronary artery disease:  GERD:  Hypothyroidism:  Ascending aortic aneurysm:  Essential hypertension:   Chronic Hyponatremia.    Consultations:  IP CONSULT TO ORTHOPEDIC SURGERY  IP CONSULT TO NEPHROLOGY  IP CONSULT TO CARDIOLOGY      Brief Admission History/Reason for Admission Per Eder Roger MD:   She is 89-year-old lady with past medical history significant for essential hypertension, hyperlipidemia, paroxysmal atrial fibrillation on Eliquis, ascending aortic aneurysm, coronary artery disease, GERD, hypothyroidism was brought to the hospital by family members after patient experienced fall.  Patient had a mechanical fall after tripping.  Ever since then patient has been experiencing pelvic pain and inability to walk.     In the ER, vital signs within the normal limits.  Labs within the normal limits.      CT imaging showed acute left superior and inferior pubic ramus fractures.  Subacute to chronic appearing bilateral sacral insufficiency fractures.      Brief Hospital Course by Main Problems:   Acute left superior and inferior pubic ramus fractures:  Subacute to chronic appearing bilateral sacral insufficiency fractures:  -Nonweightbearing on the left shoulder for 6 weeks.    -As per Ortho, For pelvic structures should heal on their own in about 6 weeks.  Mobilization as tolerated.  Weightbearing as tolerated on the lower extremity.    -Follow-up in 2 weeks with x-ray repeat at orthopedic clinic.  -Pain medications ordered.  -Case management to work on    polyethylene glycol 17 g packet  Commonly known as: GLYCOLAX  Take 1 packet by mouth daily as needed for Constipation     sennosides-docusate sodium 8.6-50 MG tablet  Commonly known as: SENOKOT-S  Take 2 tablets by mouth in the morning and at bedtime            CONTINUE taking these medications      atorvastatin 40 MG tablet  Commonly known as: LIPITOR     brimonidine 0.2 % ophthalmic solution  Commonly known as: ALPHAGAN     CENTRUM SILVER 50+MEN PO     Eliquis 5 MG Tabs tablet  Generic drug: apixaban     latanoprost 0.005 % ophthalmic solution  Commonly known as: XALATAN     levothyroxine 88 MCG tablet  Commonly known as: SYNTHROID     sertraline 25 MG tablet  Commonly known as: ZOLOFT            STOP taking these medications      amLODIPine 5 MG tablet  Commonly known as: NORVASC     cloNIDine 0.1 MG tablet  Commonly known as: CATAPRES     lisinopril-hydroCHLOROthiazide 20-12.5 MG per tablet  Commonly known as: PRINZIDE;ZESTORETIC               Where to Get Your Medications        These medications were sent to [facility]/pharmacy #[id number] - [place], [place] - [address] [phone] - F [phone]  [address] [address]      Hours: 24-hours Phone: [phone]   lidocaine 4 % external patch  polyethylene glycol 17 g packet  sennosides-docusate sodium 8.6-50 MG tablet       Information about where to get these medications is not yet available    Ask your nurse or doctor about these medications  HYDROcodone-acetaminophen  MG per tablet             DISPOSITION:    Home:       IPR:              x      SNF/LTC:                     AMA:                     Other:                         Code status: Full Code    Recommended diet: ADULT ORAL NUTRITION SUPPLEMENT; Breakfast, Dinner; Low Calorie/High Protein Oral Supplement  ADULT DIET; Regular; 1200 ml     Recommended activity: -Nonweightbearing on the left shoulder for 6 weeks.    -Mobilization as tolerated.  Weightbearing as  tolerated on the lower extremity.      Wound care: None    Follow up with:    Please follow up with your Patricia Hoffmann APRN - NP in one week  No follow-up provider specified.        Total time in minutes spent coordinating this discharge (includes going over instructions, follow-up, prescriptions, and preparing report for sign off to her PCP) :  40 minutes    Elba Singh MD

## 2024-07-17 NOTE — PLAN OF CARE
Problem: Physical Therapy - Adult  Goal: By Discharge: Performs mobility at highest level of function for planned discharge setting.  See evaluation for individualized goals.  Description: FUNCTIONAL STATUS PRIOR TO ADMISSION: Patient was independent and active without use of DME. The patient  was independent for basic and instrumental ADLs. Drives at baseline.    HOME SUPPORT PRIOR TO ADMISSION: The patient lived with son and DIL but did not require assistance. Patient is with her daughter mostly during the day, but sleeps at her son's house.    Physical Therapy Goals  Initiated 7/12/2024  1.  Patient will move from supine to sit and sit to supine, scoot up and down, and roll side to side in bed with contact guard assist within 7 day(s).    2.  Patient will perform sit to stand with contact guard assist within 7 day(s).  3.  Patient will transfer from bed to chair and chair to bed with contact guard assist using the least restrictive device within 7 day(s).  4.  Patient will ambulate with contact guard assist for 50 feet with the least restrictive device within 7 day(s).   5.  Patient will ascend/descend 3 stairs with 1 handrail(s) with minimal assistance within 7 day(s).   Outcome: Progressing   PHYSICAL THERAPY TREATMENT    Patient: Bebe Burnett (89 y.o. female)  Date: 7/17/2024  Diagnosis: Fracture [T14.8XXA]  Closed fracture of proximal end of left humerus, unspecified fracture morphology, initial encounter [S42.202A]  Closed fracture of sacrum, unspecified portion of sacrum, initial encounter ([facility]) [S32.10XA]  Closed fracture of multiple rami of left pubis, initial encounter ([facility]) [S32.592A] Fracture      Precautions: Weight Bearing, Fall Risk, Bed Alarm Right Lower Extremity Weight Bearing: Weight Bearing As Tolerated Left Lower Extremity Weight Bearing: Weight Bearing As Tolerated   Left Upper Extremity Weight Bearing: Non Weight Bearing              ASSESSMENT:  Patient continues to benefit from  skilled PT services and is slowly progressing towards goals. The patient was lying in bed when PT arrived. Started on bed exercises to warm up LE's prior to mobilizing out of bed. She was able to minimally tolerate ankle pumps, quad sets and glut sets due to pain in L hip. Unable to progress to heel slides and hip abd due to severe pain. Noted in chart her last Pain med was last night at 2335. Will coordinate with nursing next session to make sure pain med is on board before working with patient. Strongly recommend IPR rehab due to high risk for skin breakdown if the patient goes into a SNF setting, she is unable to reposition herself due to L humerus comminuted fracture and pain in L pelvis pain with minimal movement. Will need close medical management of her pain medications, chronic hyponatremia and CHF.      The patient demonstrates that she can tolerate 3 hours/day or 15 hours/week of therapy. The patient needs medical interdisciplinary team approach to monitor her care and progression. Patient requires at least two therapies, including physical and/or occupational therapy.     Patient reported lightheadedness during the session while in bed: VSS      07/17/24 0945   Vital Signs   Pulse 74   BP (!) 149/71   MAP (Calculated) 97   MAP (mmHg) 89   BP Location Right lower arm   Patient Position Supine        PLAN:  Patient continues to benefit from skilled intervention to address the above impairments.  Continue treatment per established plan of care.    Recommend with staff: therapy recommendations for staff: Recommend mobility with staff assist x2 using gait belt and Bing Stedy. and Recommend toileting using  recommended toilet device: a bedside commode and a bed pan.    Recommend for next PT session: coordinate pain medications with nursing prior to seeing patient, sit to stand transfers, bed to bedside chair transfers, lateral transfers to a chair, scooting towards head of bed, and further progression of gait

## 2024-07-17 NOTE — PROGRESS NOTES
Comprehensive Nutrition Assessment    Type and Reason for Visit:  Reassess    Nutrition Recommendations/Plan:   Continue regular diet  Discontinue ensure high protein, try magic cups BID     Malnutrition Assessment:  Malnutrition Status:  At risk for malnutrition (Comment) (07/17/24 1353)      Nutrition Assessment:    Chart reviewed; patient receiving a regular diet. Patient reports a decreased appetite today but had been eating fairly well prior to yesterday. She dislikes the ensure supplements and prefers boost. Agreeable to trying magic cups at this time. Discharge planning noted. Encouraged intake of meals.     Patient Vitals for the past 120 hrs:   PO Meals Eaten (%)   07/15/24 1700 76 - 100%   07/15/24 1300 51 - 75%   07/15/24 0900 1 - 25%   07/14/24 1811 76 - 100%     Nutrition Related Findings:    Na 130, K+ 4.7,    BM 7/16   Atorvastatin, Synthroid, Glycolax, Zoloft  Wound Type: None       Current Nutrition Intake & Therapies:    Average Meal Intake:  (varying PO)     ADULT ORAL NUTRITION SUPPLEMENT; Breakfast, Dinner; Low Calorie/High Protein Oral Supplement  ADULT DIET; Regular; 1200 ml    Anthropometric Measures:  Height: 160 cm (5' 3\")  Ideal Body Weight (IBW): 115 lbs (52 kg)       Current Body Weight: 56 kg (123 lb 7.3 oz),   IBW.    Current BMI (kg/m2): 21.9                          BMI Categories: Normal Weight (BMI 18.5-24.9)    Estimated Daily Nutrient Needs:  Energy Requirements Based On: Kcal/kg  Weight Used for Energy Requirements: Current  Energy (kcal/day): 1400 kcals (25 kcals/kg bw)  Weight Used for Protein Requirements: Current  Protein (g/day): 67g (1.2 g/kg bw)  Method Used for Fluid Requirements: 1 ml/kcal  Fluid (ml/day): 1400 mL    Nutrition Diagnosis:   No nutrition diagnosis at this time    Nutrition Interventions:   Food and/or Nutrient Delivery: Continue Current Diet, Modify Oral Nutrition Supplement  Nutrition Education/Counseling: No recommendation at this  time  Coordination of Nutrition Care: Continue to monitor while inpatient       Goals:  Previous Goal Met: Progressing toward Goal(s)  Goals: PO intake 50% or greater, PO intake 75% or greater, by next RD assessment       Nutrition Monitoring and Evaluation:   Behavioral-Environmental Outcomes: None Identified  Food/Nutrient Intake Outcomes: Food and Nutrient Intake, Supplement Intake  Physical Signs/Symptoms Outcomes: Biochemical Data, Weight    Discharge Planning:    Continue current diet     Yvette Arambula RD  Contact: ext [phone]

## 2024-07-17 NOTE — CARE COORDINATION
Care Management Initial Assessment       RUR: 14%  Readmission? No  1st IM letter given? Yes   1st  letter given: No     Chart reviewed. Assessment completed with pt and daughter at bedside on 7/16. Verified contact information and demographics. Pt lives with her son (Dagoberto) in a one level home with 3-4 MONICA. Pt is typically independent with ADLs and ambulatory with a cane. Pt has 9 children. No prior hx of SNF/rehab or HHC reported. Preferred pharmacy is [facility] on [address]. Pt is a [facility] patient, [facility]. Last seen within 3 months ago.     PT/OT recommending IPR- CM spoke with [facility]  and confirmed that [facility] are the only IPRs in [facility]. Referrals sent on 7/17 and pt has been denied IPR placement- recommending SNF at this time.    NICHO spoke with son, Rodolfo, ([phone]) who works for [employer]. He is currently considering SNF choice. In network SNFs include: [facility], [facility], [facility], [place], [place], [place], [place], and [facility].    Pt will require insurance authorization once SNF choice is obtained from family.     Will continue to follow.       07/16/24 1622   Service Assessment   Patient Orientation Alert and Oriented   Cognition Alert   History Provided By Patient;Child/Family   Primary Caregiver Self   Support Systems Children;Family Members   Patient's Healthcare Decision Maker is: Legal Next of Kin   PCP Verified by CM Yes  ([address] office)   Last Visit to PCP Within last 3 months   Prior Functional Level Independent in ADLs/IADLs   Current Functional Level Assistance with the following:;Bathing;Dressing;Toileting;Cooking;Housework;Shopping;Mobility   Can patient return to prior living arrangement Unknown at present   Ability to make needs known: Good   Family able to assist with home care needs: Yes   Would you like for me to discuss the discharge  plan with any other family members/significant others, and if so, who? Yes  (Rodolfo (son) [phone])   Financial Resources Medicare   Social/Functional History   Lives With Son   Type of Home House   Home Layout One level   Home Access Stairs to enter with rails   Entrance Stairs - Number of Steps 3   Bathroom Equipment Grab bars in shower;Shower chair   Home Equipment Cane   Receives Help From Family   ADL Assistance Needs assistance   Homemaking Assistance Needs assistance   Ambulation Assistance Needs assistance   Transfer Assistance Needs assistance   Active  No   Occupation Retired   Discharge Planning   Type of Residence House   Living Arrangements Children   Current Services Prior To Admission None   Potential Assistance Needed Skilled Nursing Facility;Transportation   DME Ordered? No   Potential Assistance Purchasing Medications No   Type of Home Care Services None   Patient expects to be discharged to: Skilled nursing facility       Advance Care Planning     General Advance Care Planning (ACP) Conversation    Date of Conversation: 7/17/2024  Conducted with: Patient with Decision Making Capacity  Other persons present: Daughter Lia ([phone])    Healthcare Decision Maker: No healthcare decision makers have been documented.  Click here to complete HealthCare Decision Makers including selection of the Healthcare Decision Maker Relationship (ie \"Primary\")   Today we documented Decision Maker(s) consistent with Legal Next of Kin hierarchy.    Content/Action Overview:  DECLINED ACP Conversation - will revisit periodically  Reviewed DNR/DNI and patient elects Full Code (Attempt Resuscitation)    Pt has 9 children.     Length of Voluntary ACP Conversation in minutes:  <16 minutes (Non-Billable)    Caridad Arreguin, MSW   Care Manager, [facility]  [phone]

## 2024-07-17 NOTE — PLAN OF CARE
Problem: Occupational Therapy - Adult  Goal: By Discharge: Performs self-care activities at highest level of function for planned discharge setting.  See evaluation for individualized goals.  Description: FUNCTIONAL STATUS PRIOR TO ADMISSION:  Patient was ambulatory, performing all ADLs and IADLs.     HOME SUPPORT: Patient lived with her son but did not require assistance.      Occupational Therapy Goals  Initiated 7/12/2024   1.  Patient will don/doff arm sling prn at Standby Assist level within 7 days.  2.  Patient will perform upper-body dressing at Standby Assist within 7 days.   3.  Patient will perform lower-body dressing at Moderate Assist within 7 days.   4.  Patient will perform toilet transfer at Minimal Assist within 7 days.  5.  Patient will perform all aspects of toileting at Minimal Assist within 7 days.   6.  Patient will participate in therapeutic activity/exercise as cleared in prep for ADLs with Supervision within 7 days.   7.  Patient will perform seated grooming/self-feeding using compensatory strategies prn with Standby Assist within 7 days.   Outcome: Progressing--limited due to pain    OCCUPATIONAL THERAPY TREATMENT  Patient: Bebe Burnett (89 y.o. female)  Date: 7/17/2024  Primary Diagnosis: Fracture [T14.8XXA]  Closed fracture of proximal end of left humerus, unspecified fracture morphology, initial encounter [S42.202A]  Closed fracture of sacrum, unspecified portion of sacrum, initial encounter ([facility]) [S32.10XA]  Closed fracture of multiple rami of left pubis, initial encounter ([facility]) [S32.592A]       Precautions: Weight Bearing, Fall Risk, Bed Alarm Right Lower Extremity Weight Bearing: Weight Bearing As Tolerated Left Lower Extremity Weight Bearing: Weight Bearing As Tolerated   Left Upper Extremity Weight Bearing: Non Weight Bearing        LUE:  No ROM of L shoulder.  LUE:  AROM allowed elbow wrist and hand only.   (Per Dc)   Chart, occupational therapy assessment, plan of  care, and goals were reviewed.    ASSESSMENT  Patient continues to benefit from skilled OT services and is not progressing towards goals this session due to pain.  Pt needed encouragement to try to  participate and was advised of plan of care.  Reviewed precautions (non WB L UE//wear sling) and pt is unaware of any ROM restriction . Pt reported that she had not had any pain medication yet this a.m.    She had 0 pain in semifowlers at bed level at rest.  When attempting moving, pt very painful and requested medication prior to further mobility attempts.  Assisted with gentle positioning of LUE/shoulder with slight anterior position of shoulder away from mattress a few degrees, using folded sheet.  Pt was painful with even the slightest movement.  She feels most comfortable holding her LUE with her dominant R UE.  Pt was educated on the benefits of wearing a soft T shirt under her sling for comfort.  She was encouraged to eat well for healing her fractures and having energy for adls/mobility; she reports that she does not like the vanilla Ensure provided on her breakfast tray.  Will check with orthopedics re: AROM of LUE.                PLAN :  Patient continues to benefit from skilled intervention to address the above impairments.  Continue treatment per established plan of care to address goals.    Recommend with staff: encourage upright at bed level and use of sling at all times  (no ROM L shoulder)    Recommend next OT session: POC    Recommendation for discharge: (in order for the patient to meet his/her long term goals): Therapy 3 hours/day 5-7 days/week    Other factors to consider for discharge: lives alone, high risk for falls, not safe to be alone, concern for safely navigating or managing the home environment, and new weight bearing restrictions limiting activity or patient is unable to maintain    IF patient discharges home will need the following DME:  needs rehab       SUBJECTIVE:   Patient stated “I  can't.”   (too painful)    OBJECTIVE DATA SUMMARY:   Cognitive/Behavioral Status:  Orientation  Overall Orientation Status: Within Normal Limits  Orientation Level: Oriented to place;Oriented to situation;Oriented to person  Cognition  Arousal/Alertness: Appears intact  Following Commands: Follows one step commands with increased time;Follows one step commands with repetition  Safety Judgement: Appears intact  Initiation: Requires cues for some  Sequencing: Requires cues for some    Functional Mobility and Transfers for ADLs:  Unable to perform due to severe pain       ADL Intervention:             Feeding Skilled Clinical Factors: limited desire to eat breakfast, does not like vanilla ensure                                                 Toileting Skilled Clinical Factors: using pure wick                                     Pain Rating:  Did not rate pain/10 reported high level of pain  Pain Intervention(s):         Activity Tolerance:   Poor due to pain.    Please refer to the flowsheet for vital signs taken during this treatment.    After treatment:   Patient left in no apparent distress in bed, Call bell within reach, Bed/ chair alarm activated, Caregiver / family present, Side rails x3, Heels elevated for pressure relief, and nurse informed    COMMUNICATION/EDUCATION:   The patient's plan of care was discussed with: physical therapist and registered nurse    ALIRIO Mayo advised no ROM to L shoulder.  ROM elbow, wrist, hand permitted.    Patient Education  Education Given To: Patient  Education Provided: Role of Therapy;Plan of Care;Precautions;ADL Adaptive Strategies;Transfer Training;Mobility Training  Education Method: Verbal;Demonstration  Barriers to Learning: None  Education Outcome: Continued education needed    Thank you for this referral.  Georgina Wyman OTR/L  Minutes: 16

## 2024-07-18 PROCEDURE — 6370000000 HC RX 637 (ALT 250 FOR IP): Performed by: INTERNAL MEDICINE

## 2024-07-18 PROCEDURE — 6360000002 HC RX W HCPCS: Performed by: STUDENT IN AN ORGANIZED HEALTH CARE EDUCATION/TRAINING PROGRAM

## 2024-07-18 PROCEDURE — 6370000000 HC RX 637 (ALT 250 FOR IP): Performed by: GENERAL ACUTE CARE HOSPITAL

## 2024-07-18 PROCEDURE — 97530 THERAPEUTIC ACTIVITIES: CPT

## 2024-07-18 PROCEDURE — 6370000000 HC RX 637 (ALT 250 FOR IP): Performed by: STUDENT IN AN ORGANIZED HEALTH CARE EDUCATION/TRAINING PROGRAM

## 2024-07-18 PROCEDURE — 1100000000 HC RM PRIVATE

## 2024-07-18 PROCEDURE — 2580000003 HC RX 258: Performed by: STUDENT IN AN ORGANIZED HEALTH CARE EDUCATION/TRAINING PROGRAM

## 2024-07-18 RX ADMIN — POLYETHYLENE GLYCOL 3350 17 G: 17 POWDER, FOR SOLUTION ORAL at 09:14

## 2024-07-18 RX ADMIN — ATORVASTATIN CALCIUM 40 MG: 40 TABLET, FILM COATED ORAL at 21:13

## 2024-07-18 RX ADMIN — APIXABAN 2.5 MG: 2.5 TABLET, FILM COATED ORAL at 09:14

## 2024-07-18 RX ADMIN — APIXABAN 2.5 MG: 2.5 TABLET, FILM COATED ORAL at 21:13

## 2024-07-18 RX ADMIN — SODIUM CHLORIDE, PRESERVATIVE FREE 10 ML: 5 INJECTION INTRAVENOUS at 09:14

## 2024-07-18 RX ADMIN — LATANOPROST 1 DROP: 50 SOLUTION OPHTHALMIC at 21:13

## 2024-07-18 RX ADMIN — BRIMONIDINE TARTRATE 1 DROP: 2 SOLUTION OPHTHALMIC at 09:14

## 2024-07-18 RX ADMIN — SODIUM CHLORIDE, PRESERVATIVE FREE 10 ML: 5 INJECTION INTRAVENOUS at 21:13

## 2024-07-18 RX ADMIN — HYDROCODONE BITARTRATE AND ACETAMINOPHEN 1 TABLET: 10; 325 TABLET ORAL at 11:46

## 2024-07-18 RX ADMIN — SERTRALINE HYDROCHLORIDE 25 MG: 25 TABLET ORAL at 21:13

## 2024-07-18 RX ADMIN — MORPHINE SULFATE 2 MG: 2 INJECTION, SOLUTION INTRAMUSCULAR; INTRAVENOUS at 18:41

## 2024-07-18 RX ADMIN — CYCLOBENZAPRINE 10 MG: 10 TABLET, FILM COATED ORAL at 03:15

## 2024-07-18 RX ADMIN — LEVOTHYROXINE SODIUM 88 MCG: 88 TABLET ORAL at 06:51

## 2024-07-18 RX ADMIN — BRIMONIDINE TARTRATE 1 DROP: 2 SOLUTION OPHTHALMIC at 21:13

## 2024-07-18 ASSESSMENT — PAIN SCALES - GENERAL
PAINLEVEL_OUTOF10: 9
PAINLEVEL_OUTOF10: 5
PAINLEVEL_OUTOF10: 3
PAINLEVEL_OUTOF10: 0
PAINLEVEL_OUTOF10: 4

## 2024-07-18 ASSESSMENT — PAIN SCALES - WONG BAKER: WONGBAKER_NUMERICALRESPONSE: HURTS A LITTLE BIT

## 2024-07-18 ASSESSMENT — PAIN DESCRIPTION - ORIENTATION: ORIENTATION: LEFT

## 2024-07-18 NOTE — CARE COORDINATION
Transition of Care Plan:    RUR: 14%  Prior Level of Functioning: independent prior to fall   Disposition: SNF- referrals pending   If SNF or IPR: Date FOC offered: 7/16/24  Date FOC received: 7/17  Accepting facility: SNF pending- IPR was denied   Date authorization started with reference number: will need AUTH for placement  Date authorization received and expires:   Follow up appointments: after rehab   DME needed: available at facility   Transportation at discharge: medical transport   IM/IMM Medicare/ letter given: to be given   Is patient a [address] and connected with [facility]? no   If yes, was [address] transfer form completed and [facility] notified?   Caregiver Contact: Rodolfo (son)   Discharge Caregiver contacted prior to discharge? yes  Care Conference needed? no  Barriers to discharge:  placement, ins auth    Update 3:11 PM  [facility] has accepted pt for SNF placement and is to start insurance authorization today. Ceas[facility] has been updated. Will continue to follow.    Chart reviewed. CM aware of discharge order. IPR referrals submitted on 7/17 to [facility]- both have declined and recommend SNF level of care. Obtained SNF choice this AM from Rodolfo guerra. Referrals have been sent to [facility] and [facility]- pending review. Once accepted, pt will require insurance authorization for placement. Will continue to follow.    CLAIRE Gallegos   Care Manager, [facility]  [phone]

## 2024-07-18 NOTE — PLAN OF CARE
Problem: Physical Therapy - Adult  Goal: By Discharge: Performs mobility at highest level of function for planned discharge setting.  See evaluation for individualized goals.  Description: FUNCTIONAL STATUS PRIOR TO ADMISSION: Patient was independent and active without use of DME. The patient  was independent for basic and instrumental ADLs. Drives at baseline.    HOME SUPPORT PRIOR TO ADMISSION: The patient lived with son and DIL but did not require assistance. Patient is with her daughter mostly during the day, but sleeps at her son's house.    Physical Therapy Goals  Initiated 7/12/2024  1.  Patient will move from supine to sit and sit to supine, scoot up and down, and roll side to side in bed with contact guard assist within 7 day(s).    2.  Patient will perform sit to stand with contact guard assist within 7 day(s).  3.  Patient will transfer from bed to chair and chair to bed with contact guard assist using the least restrictive device within 7 day(s).  4.  Patient will ambulate with contact guard assist for 50 feet with the least restrictive device within 7 day(s).   5.  Patient will ascend/descend 3 stairs with 1 handrail(s) with minimal assistance within 7 day(s).     Physical Therapy Goals  Revised 7/18/2024  1.  Patient will move from supine to sit and sit to supine, scoot up and down, and roll side to side in bed with contact guard assist within 7 day(s).    2.  Patient will perform sit to stand with minimal assistance within 7 day(s).  3.  Patient will transfer from bed to chair and chair to bed with minimal assistance using the least restrictive device within 7 day(s).  4.  Patient will ambulate with minimal assistance for 5 feet with the least restrictive device within 7 day(s).   Outcome: Progressing   PHYSICAL THERAPY TREATMENT: WEEKLY REASSESSMENT     Patient: Bebe Burnett (89 y.o. female)  Date: 7/18/2024  Diagnosis: Fracture [T14.8XXA]  Closed fracture of proximal end of left humerus,  rest, and repositioning    Activity Tolerance:   Fair     After treatment:   Patient left in no apparent distress in bed, Call bell within reach, Bed/ chair alarm activated, Side rails x3, Heels elevated for pressure relief, and Updated patient's board on functional status and mobility recommendations      COMMUNICATION/EDUCATION:   The patient's plan of care was discussed with: occupational therapist and registered nurse    Patient Education  Education Given To: Patient;Family  Education Provided: Role of Therapy;Plan of Care;Transfer Training;Fall Prevention Strategies  Education Provided Comments: bed positioning  Education Method: Verbal;Demonstration;Teach Back  Barriers to Learning: None  Education Outcome: Continued education needed      Katrin Hammond, PT  Minutes: 49

## 2024-07-18 NOTE — PLAN OF CARE
Problem: Occupational Therapy - Adult  Goal: By Discharge: Performs self-care activities at highest level of function for planned discharge setting.  See evaluation for individualized goals.  Description: FUNCTIONAL STATUS PRIOR TO ADMISSION:  Patient was ambulatory, performing all ADLs and IADLs.     HOME SUPPORT: Patient lived with her son but did not require assistance.      Occupational Therapy Goals  Initiated 7/12/2024   1.  Patient will don/doff arm sling prn at Standby Assist level within 7 days.  2.  Patient will perform upper-body dressing at Standby Assist within 7 days.   3.  Patient will perform lower-body dressing at Moderate Assist within 7 days.   4.  Patient will perform toilet transfer at Minimal Assist within 7 days.  5.  Patient will perform all aspects of toileting at Minimal Assist within 7 days.   6.  Patient will participate in therapeutic activity/exercise as cleared in prep for ADLs with Supervision within 7 days.   7.  Patient will perform seated grooming/self-feeding using compensatory strategies prn with Standby Assist within 7 days.   7/17/2024 1419 by Georgina Wyman, OTR/L  Outcome: Not Progressing     Problem: Occupational Therapy - Adult  Goal: By Discharge: Performs self-care activities at highest level of function for planned discharge setting.  See evaluation for individualized goals.  Description: FUNCTIONAL STATUS PRIOR TO ADMISSION:  Patient was ambulatory, performing all ADLs and IADLs.     HOME SUPPORT: Patient lived with her son but did not require assistance.      Occupational Therapy Goals  Initiated 7/12/2024   1.  Patient will don/doff arm sling prn at Standby Assist level within 7 days.  2.  Patient will perform upper-body dressing at Standby Assist within 7 days.   3.  Patient will perform lower-body dressing at Moderate Assist within 7 days.   4.  Patient will perform toilet transfer at Minimal Assist within 7 days.  5.  Patient will perform all aspects of

## 2024-07-18 NOTE — PROGRESS NOTES
Ortho-    Patient should follow up in my office in about 7-10 days for her fractures, and continue NWB on her left upper extremity.    Jose Ramon Mathis M.D.

## 2024-07-18 NOTE — DISCHARGE INSTRUCTIONS
HOSPITALIST DISCHARGE INSTRUCTIONS    It is important that you take the medication exactly as they are prescribed.   Keep your medication in the bottles provided by the pharmacist and keep a list of the medication names, dosages, and times to be taken in your wallet.   Do not take other medications without consulting your doctor.     What to do at Home  Recommended diet:   ADULT DIET; Regular; 1200 ml  ADULT ORAL NUTRITION SUPPLEMENT; Dinner, Lunch; Frozen Oral Supplement    Recommended activity: -Nonweightbearing on the left shoulder for 6 weeks.    -Weightbearing as tolerated on the lower extremity.      If you have questions regarding the hospital related prescriptions or hospital related issues please call [email or web address]' office at . You can always direct your questions to your primary care doctor if you are unable to reach your hospital physician; your PCP works as an extension of your hospital doctor just like your hospital doctor is an extension of your PCP for your time at the hospital ([facility])    If you experience any of the following symptoms then please call your primary care physician or return to the emergency room if you cannot get hold of your doctor:    Fever, chills, nausea, vomiting, or persistent diarrhea  Worsening weakness or new problems with your speech or balance  Dark stools or visible blood in your stools  New Leg swelling or shortness of breath as these could be signs of a clot    Additional Instructions:    Please follow up with your PCP in one week.   Bring these papers with you to your follow up appointments. The papers will help your doctors be sure to continue the care plan from the hospital.        Information obtained by :  I understand that if any problems occur once I am at home I am to contact my physician.    I understand and acknowledge receipt of the instructions indicated above.                                                                                                                                            Physician's or R.N.'s Signature                                                                  Date/Time                                                                                                                                              Patient or Representative Signature                          Humerus Fracture: Care Instructions  Overview     Your humerus is a bone in your upper arm. It extends from your shoulder to your elbow, and it is the largest bone in your arm. This bone may break (fracture) during sports, a fall, or other accidents. It may happen when your arm or shoulder is hit or used to protect you in a fall.  Fractures can range from a small, hairline crack to a bone or bones broken into two or more pieces. Your treatment depends on how bad the break is.  Your doctor may have put your arm in a cast, splint, or sling to allow it to heal or to keep it stable until you see another doctor. It may take weeks or months for your arm to heal. You can help your arm heal with some care at home.  You heal best when you take good care of yourself. Eat a variety of healthy foods, and don't smoke.  Follow-up care is a key part of your treatment and safety. Be sure to make and go to all appointments, and call your doctor if you are having problems. It's also a good idea to know your test results and keep a list of the medicines you take.  How can you care for yourself at home?  Put ice or a cold pack on your arm for 10 to 20 minutes at a time. Try to do this every 1 to 2 hours for the next 3 days (when you are awake). Put a thin cloth between the ice and your cast or splint. Keep the cast or splint dry. If you do not have a splint or cast, use a cloth between the ice and your skin.  Follow the care instructions your doctor gives you. If you have a sling, do not take it off unless your doctor tells you to.  Be safe with medicines. Take pain

## 2024-07-18 NOTE — PROGRESS NOTES
Bedside, Verbal, Recorded, and Written shift change report given to LONI Barros (oncoming nurse) by BETHANY Mcmanus (offgoing nurse). Report included the following information Nurse Handoff Report, ED SBAR, MAR, Recent Results, and Quality Measures.   The patient had a fair night complained of pain at night and Morphine 2 mg IV was given

## 2024-07-18 NOTE — DISCHARGE SUMMARY
Discharge Summary    Name: Bebe Burnett  [id number]  YOB: 1934 (Age: 89 y.o.)   Date of Admission: 7/11/2024  Date of Discharge: 7/18/2024  Attending Physician: Elba Singh MD      Discharge Diagnosis:   Acute left superior and inferior pubic ramus fractures:  Acute comminuted fracture of the left humeral neck with inferior dislocation of the humeral head relative to the glenoid.  Subacute to chronic appearing bilateral sacral insufficiency fractures:  Bradycardia  Hyperlipidemia:  Paroxysmal atrial fibrillation:  Coronary artery disease:  GERD:  Hypothyroidism:  Ascending aortic aneurysm:  Essential hypertension:   Chronic Hyponatremia.    Consultations:  IP CONSULT TO ORTHOPEDIC SURGERY  IP CONSULT TO NEPHROLOGY  IP CONSULT TO CARDIOLOGY      Brief Admission History/Reason for Admission Per Eder Roger MD:   She is 89-year-old lady with past medical history significant for essential hypertension, hyperlipidemia, paroxysmal atrial fibrillation on Eliquis, ascending aortic aneurysm, coronary artery disease, GERD, hypothyroidism was brought to the hospital by family members after patient experienced fall.  Patient had a mechanical fall after tripping.  Ever since then patient has been experiencing pelvic pain and inability to walk.     In the ER, vital signs within the normal limits.  Labs within the normal limits.      CT imaging showed acute left superior and inferior pubic ramus fractures.  Subacute to chronic appearing bilateral sacral insufficiency fractures.      Brief Hospital Course by Main Problems:   Acute left superior and inferior pubic ramus fractures  Acute comminuted fracture of the left humeral neck with inferior dislocation of the humeral head relative to the glenoid.  Subacute to chronic appearing bilateral sacral insufficiency fractures:  Extensive ecchymosis on left upper back/left chest/breast.  -Xray: Acute comminuted  Phone: [phone]   lidocaine 4 % external patch  polyethylene glycol 17 g packet  sennosides-docusate sodium 8.6-50 MG tablet       Information about where to get these medications is not yet available    Ask your nurse or doctor about these medications  HYDROcodone-acetaminophen  MG per tablet             DISPOSITION:    Home:       IPR:                  SNF/LTC:             x        AMA:                     Other:                         Code status: Full Code    Recommended diet: ADULT DIET; Regular; 1200 ml  ADULT ORAL NUTRITION SUPPLEMENT; Dinner, Lunch; Frozen Oral Supplement     Recommended activity: -Nonweightbearing on the left shoulder for 6 weeks.    -Weightbearing as tolerated on the lower extremity.      Wound care: None    Follow up with:    Please follow up with your Patricia Hoffmann APRN - NP in one week  [facility] ([facility]  [address] [facility] [address] [phone]        Jose Ramon Mathis MD  [address] [facility] [address] [phone]    Follow up in 1 week(s)      Dionte Torrez MD  [address] [address] [phone]    Follow up in 2 week(s)          Total time in minutes spent coordinating this discharge (includes going over instructions, follow-up, prescriptions, and preparing report for sign off to her PCP) :  40 minutes    Elba Singh MD

## 2024-07-18 NOTE — PROGRESS NOTES
Pt;s son (Rodolfo) was upset because he wanted to know if his mother was going to have surgery and needed a call. Staff listened to his frustration and has pass to Nurse Alvarado to have Ortho MD in am call Rodolfo at [phone].

## 2024-07-18 NOTE — PLAN OF CARE
Problem: Occupational Therapy - Adult  Goal: By Discharge: Performs self-care activities at highest level of function for planned discharge setting.  See evaluation for individualized goals.  Description: FUNCTIONAL STATUS PRIOR TO ADMISSION:  Patient was ambulatory, performing all ADLs and IADLs.     HOME SUPPORT: Patient lived with her son but did not require assistance.      Occupational Therapy Goals  Initiated 7/12/2024   1.  Patient will don/doff arm sling prn at Standby Assist level within 7 days.  2.  Patient will perform upper-body dressing at Standby Assist within 7 days.   3.  Patient will perform lower-body dressing at Moderate Assist within 7 days.   4.  Patient will perform toilet transfer at Minimal Assist within 7 days.  5.  Patient will perform all aspects of toileting at Minimal Assist within 7 days.   6.  Patient will participate in therapeutic activity/exercise as cleared in prep for ADLs with Supervision within 7 days.   7.  Patient will perform seated grooming/self-feeding using compensatory strategies prn with Standby Assist within 7 days.   Outcome: Progressing    OCCUPATIONAL THERAPY TREATMENT  Patient: Bebe Burnett (89 y.o. female)  Date: 7/18/2024  Primary Diagnosis: Fracture [T14.8XXA]  Closed fracture of proximal end of left humerus, unspecified fracture morphology, initial encounter [S42.202A]  Closed fracture of sacrum, unspecified portion of sacrum, initial encounter ([facility]) [S32.10XA]  Closed fracture of multiple rami of left pubis, initial encounter ([facility]) [S32.592A]       Precautions: Weight Bearing, Fall Risk, Bed Alarm Right Lower Extremity Weight Bearing: Weight Bearing As Tolerated Left Lower Extremity Weight Bearing: Weight Bearing As Tolerated   Left Upper Extremity Weight Bearing: Non Weight Bearing        Chart, occupational therapy assessment, plan of care, and goals were reviewed.    ASSESSMENT  Patient continues to benefit from skilled OT services and is slowly  progressing towards goals. Pt noted with better controlled pain this date, reporting 4/10 LUE/LLE pain s/p completion of session.  Pt educated on AROM distal to L elbow, with good engagement with forearm supination/pronation, as well as, digital flexion/extension.  Pt progressing with activity tolerance and mobility/balance, evidenced by good engagement with bed mobility and standing weight shifting.  Family reports desires for ortho follow up to further clarify LUE POC, with RN aware and following.  Continue skilled OT during acute hospitalization to address functional deficits.       PLAN :  Patient continues to benefit from skilled intervention to address the above impairments.  Continue treatment per established plan of care to address goals.    Recommend with staff: Frequent positional changes, bed level ADL engagement    Recommend next OT session: POC progression    Recommendation for discharge: (in order for the patient to meet his/her long term goals): Therapy up to 5 days/week in Skilled nursing facility    Other factors to consider for discharge: available support system works or is unable to provide adequate supervision and the patient would be alone, high risk for falls, and new weight bearing restrictions limiting activity or patient is unable to maintain    IF patient discharges home will need the following DME:  TBD       SUBJECTIVE:   Patient stated “Will you two be coming back tomorrow?  I like yall.”    OBJECTIVE DATA SUMMARY:   Cognitive/Behavioral Status:  Orientation  Overall Orientation Status: Within Normal Limits  Orientation Level: Oriented X4  Cognition  Overall Cognitive Status: WNL    Functional Mobility and Transfers for ADLs:  Bed Mobility:  Bed Mobility Training  Bed Mobility Training: Yes  Rolling: Maximum assistance;Assist X1  Supine to Sit: Maximum assistance;Assist X2  Sit to Supine: Maximum assistance;Assist X2  Scooting: Maximum assistance     Transfers:   Transfer  Training  Transfer Training: Yes  Sit to Stand: Moderate assistance;Assist X2  Stand to Sit: Moderate assistance;Assist X2           Balance:     Balance  Sitting - Static: Good (unsupported)  Sitting - Dynamic: Fair (occasional)  Standing: Impaired  Standing - Static: Poor;Constant support  Standing - Dynamic: Poor;Constant support      ADL Intervention:  UE Dressing: Dependent/Total  UE Dressing Skilled Clinical Factors: for sling management    Therapeutic Activities:  Completed standing weight shifting thera ac, with RUE pulling up on armchair, with Max Ax2 and Max A to facilitate lateral and anterior/posterior weight shift.    Pain Ratin/10; LUE/LLE  Pain Intervention(s):   nursing notified and addressing, rest, elevation, and repositioning      Activity Tolerance:   Fair , Poor, and requires frequent rest breaks  Please refer to the flowsheet for vital signs taken during this treatment.    After treatment:   Patient left in no apparent distress in bed, Call bell within reach, Bed/ chair alarm activated, Caregiver / family present, and Side rails x3    COMMUNICATION/EDUCATION:   The patient's plan of care was discussed with: physical therapist, registered nurse, and     Patient Education  Education Given To: Patient;Family  Education Provided: ADL Adaptive Strategies  Education Provided Comments: educated on modified dressing techniques  Education Method: Verbal  Barriers to Learning: None  Education Outcome: Verbalized understanding    Thank you for this referral.  Matthew Kerley, OT  Minutes: 39

## 2024-07-19 VITALS
TEMPERATURE: 98.1 F | RESPIRATION RATE: 18 BRPM | DIASTOLIC BLOOD PRESSURE: 65 MMHG | OXYGEN SATURATION: 99 % | BODY MASS INDEX: 22.59 KG/M2 | WEIGHT: 127.5 LBS | HEIGHT: 63 IN | SYSTOLIC BLOOD PRESSURE: 126 MMHG | HEART RATE: 78 BPM

## 2024-07-19 LAB
ANION GAP SERPL CALC-SCNC: 5 MMOL/L (ref 5–15)
BUN SERPL-MCNC: 14 MG/DL (ref 6–20)
BUN/CREAT SERPL: 27 (ref 12–20)
CALCIUM SERPL-MCNC: 8.6 MG/DL (ref 8.5–10.1)
CHLORIDE SERPL-SCNC: 99 MMOL/L (ref 97–108)
CO2 SERPL-SCNC: 26 MMOL/L (ref 21–32)
CREAT SERPL-MCNC: 0.52 MG/DL (ref 0.55–1.02)
ERYTHROCYTE [DISTWIDTH] IN BLOOD BY AUTOMATED COUNT: 17 % (ref 11.5–14.5)
GLUCOSE SERPL-MCNC: 142 MG/DL (ref 65–100)
HCT VFR BLD AUTO: 26.6 % (ref 35–47)
HGB BLD-MCNC: 8.5 G/DL (ref 11.5–16)
MCH RBC QN AUTO: 28.6 PG (ref 26–34)
MCHC RBC AUTO-ENTMCNC: 32 G/DL (ref 30–36.5)
MCV RBC AUTO: 89.6 FL (ref 80–99)
NRBC # BLD: 0.02 K/UL (ref 0–0.01)
NRBC BLD-RTO: 0.2 PER 100 WBC
PLATELET # BLD AUTO: 210 K/UL (ref 150–400)
PMV BLD AUTO: 9.6 FL (ref 8.9–12.9)
POTASSIUM SERPL-SCNC: 4.2 MMOL/L (ref 3.5–5.1)
RBC # BLD AUTO: 2.97 M/UL (ref 3.8–5.2)
SODIUM SERPL-SCNC: 130 MMOL/L (ref 136–145)
WBC # BLD AUTO: 9.5 K/UL (ref 3.6–11)

## 2024-07-19 PROCEDURE — 6370000000 HC RX 637 (ALT 250 FOR IP): Performed by: INTERNAL MEDICINE

## 2024-07-19 PROCEDURE — 36415 COLL VENOUS BLD VENIPUNCTURE: CPT

## 2024-07-19 PROCEDURE — 80048 BASIC METABOLIC PNL TOTAL CA: CPT

## 2024-07-19 PROCEDURE — 6360000002 HC RX W HCPCS: Performed by: STUDENT IN AN ORGANIZED HEALTH CARE EDUCATION/TRAINING PROGRAM

## 2024-07-19 PROCEDURE — 85027 COMPLETE CBC AUTOMATED: CPT

## 2024-07-19 PROCEDURE — 6370000000 HC RX 637 (ALT 250 FOR IP): Performed by: GENERAL ACUTE CARE HOSPITAL

## 2024-07-19 PROCEDURE — 6370000000 HC RX 637 (ALT 250 FOR IP): Performed by: STUDENT IN AN ORGANIZED HEALTH CARE EDUCATION/TRAINING PROGRAM

## 2024-07-19 PROCEDURE — 2580000003 HC RX 258: Performed by: STUDENT IN AN ORGANIZED HEALTH CARE EDUCATION/TRAINING PROGRAM

## 2024-07-19 RX ORDER — HYDROCODONE BITARTRATE AND ACETAMINOPHEN 10; 325 MG/1; MG/1
1 TABLET ORAL EVERY 6 HOURS PRN
Qty: 10 TABLET | Refills: 0 | Status: SHIPPED | OUTPATIENT
Start: 2024-07-19 | End: 2024-07-22

## 2024-07-19 RX ADMIN — HYDROCODONE BITARTRATE AND ACETAMINOPHEN 1 TABLET: 10; 325 TABLET ORAL at 17:55

## 2024-07-19 RX ADMIN — BRIMONIDINE TARTRATE 1 DROP: 2 SOLUTION OPHTHALMIC at 09:15

## 2024-07-19 RX ADMIN — LEVOTHYROXINE SODIUM 88 MCG: 88 TABLET ORAL at 05:31

## 2024-07-19 RX ADMIN — POLYETHYLENE GLYCOL 3350 17 G: 17 POWDER, FOR SOLUTION ORAL at 09:15

## 2024-07-19 RX ADMIN — SODIUM CHLORIDE, PRESERVATIVE FREE 10 ML: 5 INJECTION INTRAVENOUS at 09:38

## 2024-07-19 RX ADMIN — APIXABAN 2.5 MG: 2.5 TABLET, FILM COATED ORAL at 09:14

## 2024-07-19 RX ADMIN — MORPHINE SULFATE 2 MG: 2 INJECTION, SOLUTION INTRAMUSCULAR; INTRAVENOUS at 06:37

## 2024-07-19 ASSESSMENT — PAIN SCALES - GENERAL
PAINLEVEL_OUTOF10: 9
PAINLEVEL_OUTOF10: 6
PAINLEVEL_OUTOF10: 2
PAINLEVEL_OUTOF10: 0

## 2024-07-19 ASSESSMENT — PAIN DESCRIPTION - DESCRIPTORS: DESCRIPTORS: ACHING

## 2024-07-19 ASSESSMENT — PAIN DESCRIPTION - ORIENTATION: ORIENTATION: LEFT

## 2024-07-19 ASSESSMENT — PAIN SCALES - WONG BAKER: WONGBAKER_NUMERICALRESPONSE: NO HURT

## 2024-07-19 ASSESSMENT — PAIN DESCRIPTION - LOCATION: LOCATION: ARM

## 2024-07-19 NOTE — PROGRESS NOTES
Discharge order received, facility name and report number confirmed from assigned nurse. Discharge report given to LONI Gonzalez (receiving nurse) of [facility] by BETHANY Ng (discharging nurse). Report included the following information Nurse Handoff Report, Index, ED Encounter Summary, ED SBAR, Adult Overview, Intake/Output, MAR, Recent Results, Med Rec Status, Quality Measures, Neuro Assessment, and Event Log.  Opportunity for questions offered and clarification provided as required. Receiving nurse verbalized understanding of discharge instructions and medications.

## 2024-07-19 NOTE — PLAN OF CARE
Problem: Pain  Goal: Verbalizes/displays adequate comfort level or baseline comfort level  7/18/2024 1611 by Fiorella Palomares LPN  Outcome: Adequate for Discharge     Problem: Skin/Tissue Integrity  Goal: Absence of new skin breakdown  Description: 1.  Monitor for areas of redness and/or skin breakdown  2.  Assess vascular access sites hourly  3.  Every 4-6 hours minimum:  Change oxygen saturation probe site  4.  Every 4-6 hours:  If on nasal continuous positive airway pressure, respiratory therapy assess nares and determine need for appliance change or resting period.  7/18/2024 1611 by Fiorella Palomares LPN  Outcome: Adequate for Discharge     Problem: ABCDS Injury Assessment  Goal: Absence of physical injury  7/18/2024 1611 by Fiorella Palomares LPN  Outcome: Adequate for Discharge     Problem: Safety - Adult  Goal: Free from fall injury  7/18/2024 1611 by Fiorella Palomares LPN  Outcome: Adequate for Discharge     Problem: Discharge Planning  Goal: Discharge to home or other facility with appropriate resources  7/18/2024 1611 by Fiorella Palomares LPN  Outcome: Adequate for Discharge

## 2024-07-19 NOTE — PROGRESS NOTES
NAME: Bebe Burnett        :  10/21/1934        MRN:  [id number]                   Assessment   :                                               Plan:  Hyponatremia, chronic  HTN --> now hypotension  DM2  Afib  S/p fall  Pubic fx/left humerus fx  Bradycardia Sodium  ~ 130  Thiazide contraindiated    Will see him here if she is still there on monday             Subjective:     Chief Complaint:   awake and alert x 3 .     Review of Systems:    Symptom Y/N Comments  Symptom Y/N Comments   Fever/Chills    Chest Pain     Poor Appetite    Edema     Cough    Abdominal Pain     Sputum    Joint Pain     SOB/WYATT    Pruritis/Rash     Nausea/vomit    Tolerating PT/OT     Diarrhea    Tolerating Diet     Constipation    Other       Could not obtain due to: lethargy     Objective:     VITALS:   Last 24hrs VS reviewed since prior progress note. Most recent are:  Vitals:    24 0900   BP: 126/65   Pulse: 78   Resp: 18   Temp: 98.1 °F (36.7 °C)   SpO2: 99%       Intake/Output Summary (Last 24 hours) at 2024 1504  Last data filed at 2024 0643  Gross per 24 hour   Intake 800 ml   Output 1200 ml   Net -400 ml      Telemetry Reviewed:     PHYSICAL EXAM:  General: NAD  No edema      Lab Data Reviewed: (see below)    Medications Reviewed: (see below)    PMH/SH reviewed - no change compared to H&P  ________________________________________________________________________  Care Plan discussed with:  Patient x    Family  x    RN     Care Manager                    Consultant:          Comments   >50% of visit spent in counseling and coordination of care       ________________________________________________________________________  Marvin Duran MD     Procedures: see electronic medical records for all procedures/Xrays and details which  were not copied into this note but were reviewed prior to creation of Plan.      LABS:  Recent Labs      25 mg  25 mg Oral Nightly    polyethylene glycol (GLYCOLAX) packet 17 g  17 g Oral Daily    HYDROcodone-acetaminophen (NORCO)  MG per tablet 1 tablet  1 tablet Oral Q6H PRN    morphine (PF) injection 2 mg  2 mg IntraVENous Q4H PRN

## 2024-07-19 NOTE — PLAN OF CARE
Problem: Pain  Goal: Verbalizes/displays adequate comfort level or baseline comfort level  Outcome: Progressing     Problem: Pain  Goal: Verbalizes/displays adequate comfort level or baseline comfort level  7/18/2024 1611 by Fiorella Palomares LPN  Outcome: Adequate for Discharge     Problem: Skin/Tissue Integrity  Goal: Absence of new skin breakdown  Description: 1.  Monitor for areas of redness and/or skin breakdown  2.  Assess vascular access sites hourly  3.  Every 4-6 hours minimum:  Change oxygen saturation probe site  4.  Every 4-6 hours:  If on nasal continuous positive airway pressure, respiratory therapy assess nares and determine need for appliance change or resting period.  7/18/2024 1611 by Firoella Palomares LPN  Outcome: Adequate for Discharge     Problem: ABCDS Injury Assessment  Goal: Absence of physical injury  7/18/2024 1611 by Fiorella Palomares LPN  Outcome: Adequate for Discharge     Problem: Safety - Adult  Goal: Free from fall injury  7/18/2024 1611 by Fiorella Palomares LPN  Outcome: Adequate for Discharge     Problem: Discharge Planning  Goal: Discharge to home or other facility with appropriate resources  7/18/2024 1611 by Fiorella Palomares LPN  Outcome: Adequate for Discharge

## 2024-07-19 NOTE — DISCHARGE SUMMARY
Discharge Summary    Name: Bebe Burnett  [id number]  YOB: 1934 (Age: 89 y.o.)   Date of Admission: 7/11/2024  Date of Discharge: 7/19/2024  Attending Physician: Elba Singh MD      Discharge Diagnosis:   Acute left superior and inferior pubic ramus fractures:  Acute comminuted fracture of the left humeral neck with inferior dislocation of the humeral head relative to the glenoid.  Subacute to chronic appearing bilateral sacral insufficiency fractures:  Bradycardia  Hyperlipidemia:  Paroxysmal atrial fibrillation:  Coronary artery disease:  GERD:  Hypothyroidism:  Ascending aortic aneurysm:  Essential hypertension:   Chronic Hyponatremia.    Consultations:  IP CONSULT TO ORTHOPEDIC SURGERY  IP CONSULT TO NEPHROLOGY  IP CONSULT TO CARDIOLOGY      Brief Admission History/Reason for Admission Per Eder Roger MD:   She is 89-year-old lady with past medical history significant for essential hypertension, hyperlipidemia, paroxysmal atrial fibrillation on Eliquis, ascending aortic aneurysm, coronary artery disease, GERD, hypothyroidism was brought to the hospital by family members after patient experienced fall.  Patient had a mechanical fall after tripping.  Ever since then patient has been experiencing pelvic pain and inability to walk.     In the ER, vital signs within the normal limits.  Labs within the normal limits.      CT imaging showed acute left superior and inferior pubic ramus fractures.  Subacute to chronic appearing bilateral sacral insufficiency fractures.      Brief Hospital Course by Main Problems:   Acute left superior and inferior pubic ramus fractures  Acute comminuted fracture of the left humeral neck with inferior dislocation of the humeral head relative to the glenoid.  Subacute to chronic appearing bilateral sacral insufficiency fractures:  Extensive ecchymosis on left upper back/left chest/breast.  -Xray: Acute comminuted      07/19/24  0534   *   K 4.2   CL 99   CO2 26   BUN 14   CREATININE 0.52*   GLUCOSE 142*   CALCIUM 8.6       Recent Labs     07/19/24  0534   HGB 8.5*   HCT 26.6*   WBC 9.5          No results found for this or any previous visit.    Results       ** No results found for the last 336 hours. **                Discharge Medications:     Medication List        START taking these medications      HYDROcodone-acetaminophen  MG per tablet  Commonly known as: NORCO  Take 1 tablet by mouth every 6 hours as needed for Pain for up to 3 days. Max Daily Amount: 4 tablets     lidocaine 4 % external patch  Place 2 patches onto the skin daily as needed (pain)     polyethylene glycol 17 g packet  Commonly known as: GLYCOLAX  Take 1 packet by mouth daily as needed for Constipation     sennosides-docusate sodium 8.6-50 MG tablet  Commonly known as: SENOKOT-S  Take 2 tablets by mouth in the morning and at bedtime            CONTINUE taking these medications      atorvastatin 40 MG tablet  Commonly known as: LIPITOR     brimonidine 0.2 % ophthalmic solution  Commonly known as: ALPHAGAN     CENTRUM SILVER 50+MEN PO     Eliquis 5 MG Tabs tablet  Generic drug: apixaban     latanoprost 0.005 % ophthalmic solution  Commonly known as: XALATAN     levothyroxine 88 MCG tablet  Commonly known as: SYNTHROID     sertraline 25 MG tablet  Commonly known as: ZOLOFT            STOP taking these medications      amLODIPine 5 MG tablet  Commonly known as: NORVASC     cloNIDine 0.1 MG tablet  Commonly known as: CATAPRES     lisinopril-hydroCHLOROthiazide 20-12.5 MG per tablet  Commonly known as: PRINZIDE;ZESTORETIC               Where to Get Your Medications        These medications were sent to [facility][id number] - [place], [place] - [address] - P [phone] - F [phone]  [address] [facility] [address]      Hours: 24-hours Phone: [phone]   lidocaine 4 % external patch  polyethylene glycol 17  g packet  sennosides-docusate sodium 8.6-50 MG tablet       Information about where to get these medications is not yet available    Ask your nurse or doctor about these medications  HYDROcodone-acetaminophen  MG per tablet             DISPOSITION:    Home:       IPR:                  SNF/LTC:             x        AMA:                     Other:                         Code status: Full Code    Recommended diet: ADULT DIET; Regular; 1200 ml  ADULT ORAL NUTRITION SUPPLEMENT; Dinner, Lunch; Frozen Oral Supplement     Recommended activity: -Nonweightbearing on the left shoulder for 6 weeks.    -Weightbearing as tolerated on the lower extremity.      Wound care: None    Follow up with:    Please follow up with your Patricia Hoffmann APRN - NP in one week  [facility] (Link)  [address] [facility] [address] [phone]        Jose Ramon Mathis MD  [address] [facility] [address] [phone]    Follow up in 1 week(s)      Dionte Torrez MD  [address] [address] [phone]    Follow up in 2 week(s)          Total time in minutes spent coordinating this discharge (includes going over instructions, follow-up, prescriptions, and preparing report for sign off to her PCP) :  40 minutes    Elba Singh MD

## 2024-07-19 NOTE — PLAN OF CARE
Problem: Physical Therapy - Adult  Goal: By Discharge: Performs mobility at highest level of function for planned discharge setting.  See evaluation for individualized goals.  Description: FUNCTIONAL STATUS PRIOR TO ADMISSION: Patient was independent and active without use of DME. The patient  was independent for basic and instrumental ADLs. Drives at baseline.    HOME SUPPORT PRIOR TO ADMISSION: The patient lived with son and DIL but did not require assistance. Patient is with her daughter mostly during the day, but sleeps at her son's house.    Physical Therapy Goals  Initiated 7/12/2024  1.  Patient will move from supine to sit and sit to supine, scoot up and down, and roll side to side in bed with contact guard assist within 7 day(s).    2.  Patient will perform sit to stand with contact guard assist within 7 day(s).  3.  Patient will transfer from bed to chair and chair to bed with contact guard assist using the least restrictive device within 7 day(s).  4.  Patient will ambulate with contact guard assist for 50 feet with the least restrictive device within 7 day(s).   5.  Patient will ascend/descend 3 stairs with 1 handrail(s) with minimal assistance within 7 day(s).   7/18/2024 1604 by Katrin Hammond, PT  Outcome: Progressing     Problem: Occupational Therapy - Adult  Goal: By Discharge: Performs self-care activities at highest level of function for planned discharge setting.  See evaluation for individualized goals.  Description: FUNCTIONAL STATUS PRIOR TO ADMISSION:  Patient was ambulatory, performing all ADLs and IADLs.     HOME SUPPORT: Patient lived with her son but did not require assistance.      Occupational Therapy Goals  Initiated 7/12/2024   1.  Patient will don/doff arm sling prn at Standby Assist level within 7 days.  2.  Patient will perform upper-body dressing at Standby Assist within 7 days.   3.  Patient will perform lower-body dressing at Moderate Assist within 7 days.   4.  Patient  will perform toilet transfer at Minimal Assist within 7 days.  5.  Patient will perform all aspects of toileting at Minimal Assist within 7 days.   6.  Patient will participate in therapeutic activity/exercise as cleared in prep for ADLs with Supervision within 7 days.   7.  Patient will perform seated grooming/self-feeding using compensatory strategies prn with Standby Assist within 7 days.   7/18/2024 1546 by Kerley, Matthew, OT  Outcome: Progressing

## 2024-07-19 NOTE — ACP (ADVANCE CARE PLANNING)
Advance Care Planning     Advance Care Planning Inpatient Note  [facility] Department    Today's Date: 7/19/2024  Unit: [facility] 3 MED TELE    Received request from IDT Member.  Upon review of chart and communication with care team, patient's decision making abilities are not in question.. Patient, Child/Children, and sister were present in the room during visit.    Goals of ACP Conversation:  Discuss advance care planning documents    Health Care Decision Makers:     No healthcare decision makers have been documented.  Click here to complete HealthCare Decision Makers including selection of the Healthcare Decision Maker Relationship (ie \"Primary\")  Summary:  No Decision Maker named by patient at this time     Advance Care Planning Documents (Patient Wishes):  Healthcare Power of /Advance Directive Appointment of Health Care Agent     Assessment:   responded to request from  for an AMD consult with patient in 3240. Patient was in bed, her son Rodolfo was present in the room. Patient stated she was tired at this time but upon motivation from son she agreed to discuss document, which  reviewed. Patient stated that she would like to talk to her other son before proceeding with document. A copy was left with her and she was advised to contact  when ready complete it.       Interventions:  Encouraged ongoing ACP conversation with future decision makers and loved ones    Care Preferences Communicated:   No    Outcomes/Plan:  ACP Discussion: Completed    Electronically signed by CINDY Toledo on 7/19/2024 at 12:55 PM

## 2024-07-19 NOTE — PLAN OF CARE
Problem: Physical Therapy - Adult  Goal: By Discharge: Performs mobility at highest level of function for planned discharge setting.  See evaluation for individualized goals.  Description: FUNCTIONAL STATUS PRIOR TO ADMISSION: Patient was independent and active without use of DME. The patient  was independent for basic and instrumental ADLs. Drives at baseline.    HOME SUPPORT PRIOR TO ADMISSION: The patient lived with son and DIL but did not require assistance. Patient is with her daughter mostly during the day, but sleeps at her son's house.    Physical Therapy Goals  Initiated 7/12/2024  1.  Patient will move from supine to sit and sit to supine, scoot up and down, and roll side to side in bed with contact guard assist within 7 day(s).    2.  Patient will perform sit to stand with contact guard assist within 7 day(s).  3.  Patient will transfer from bed to chair and chair to bed with contact guard assist using the least restrictive device within 7 day(s).  4.  Patient will ambulate with contact guard assist for 50 feet with the least restrictive device within 7 day(s).   5.  Patient will ascend/descend 3 stairs with 1 handrail(s) with minimal assistance within 7 day(s).   7/18/2024 1604 by Katrin Hammond, PT  Outcome: Progressing     Problem: Occupational Therapy - Adult  Goal: By Discharge: Performs self-care activities at highest level of function for planned discharge setting.  See evaluation for individualized goals.  Description: FUNCTIONAL STATUS PRIOR TO ADMISSION:  Patient was ambulatory, performing all ADLs and IADLs.     HOME SUPPORT: Patient lived with her son but did not require assistance.      Occupational Therapy Goals  Initiated 7/12/2024   1.  Patient will don/doff arm sling prn at Standby Assist level within 7 days.  2.  Patient will perform upper-body dressing at Standby Assist within 7 days.   3.  Patient will perform lower-body dressing at Moderate Assist within 7 days.   4.  Patient

## 2024-07-19 NOTE — PROGRESS NOTES
Bedside, Verbal, Recorded, and Written shift change report given to LONI Barros (oncoming nurse) by BETHANY Mcmanus (offgoing nurse). Report included the following information Recent results, MAR and SBAR.   Had a fair night complained of pain this morning and Morphine 2 mg IV was given

## 2024-07-19 NOTE — CARE COORDINATION
Cleared for D/C from CM standpoint  Transition of Care Plan:     RUR: 14%  Prior Level of Functioning: independent prior to fall   Disposition: SNF- [facility]   If SNF or IPR: Date FOC offered: 7/16/24  Date FOC received: 7/17  Accepting facility: [facility]   Date authorization started with reference number: 7/17  Date authorization received and expires: 7/19  Follow up appointments: after rehab   DME needed: available at facility   Transportation at discharge: H2H ETA 6PM  IM/IMM Medicare/ letter given: given   Is patient a  and connected with VA? no              If yes, was [place] transfer form completed and VA notified?   Caregiver Contact: Rodolfo (son)   Discharge Caregiver contacted prior to discharge? yes  Care Conference needed? no  Barriers to discharge:  none    CM aware of discharge order. Jude Watters has received insurance authorization for SNF placement- able to accept today at 6PM. Pt assigned to room#408. RN to call report to [phone]. CM secured H2H transport, ETA 6PM. 2nd IM given and reviewed. Signed copy to be placed into chart.     Medicaid screening completed on this day with [facility] per request of son (Rodolfo). Pt is not eligible for Medicaid or LTC services at this time as she is over resourced. Rodolfo had asked for a UAI to be completed, however since pt is not eligible for Medicaid he is agreeable to defer UAI screening for now.     Referred pt to  this AM for ACP discussion.     No further CM needs identified.       07/19/24 9247   Services At/After Discharge   Transition of Care Consult (CM Consult) Discharge Planning   Services At/After Discharge Skilled Nursing Facility (SNF);Transport    Resource Information Provided? No   Mode of Transport at Discharge BLS  (H2H)   Hospital Transport Time of Discharge 1800   Confirm Follow Up Transport Family   Condition of Participation: Discharge Planning   The Plan for

## 2024-07-19 NOTE — PROGRESS NOTES
responded to request from  for an AMD consult with patient in 3240. Patient was in bed, her son Rodolfo was present in the room. Patient stated she was tired at this time but upon motivation from son she agreed to discuss document, which  reviewed. Patient stated that she would like to talk to her other son before proceeding with document. A copy was left with her and she was advised to contact  when ready complete it.      provided supportive presence and listened with affirmation as pt's son discussed her medical condition and also shared family stories/memories and other concerns. They have a large supportive family and narinder is very important to patient and family. No need expressed when  inquired from patient. Spiritual health is available for further referrals.     Visited by: Chaplain Danielito Hunt M.Div., [facility].   Paging Service: [phone])

## 2024-07-21 NOTE — PROGRESS NOTES
Physician Progress Note      PATIENT:               REINIER DIETRICH  CSN #:                  [id number]  :                       10/21/1934  ADMIT DATE:       2024 2:54 PM  DISCH DATE:        2024 6:08 PM  RESPONDING  PROVIDER #:        Eder Jernigan MD          QUERY TEXT:    Pt admitted with Acute left superior and inferior pubic ramus fractures. Pt   noted to have osteopenia in XR Hip . If possible, please document in   progress notes and discharge summary if you are evaluating and/or treating any   of the following:    The medical record reflects the following:  Risk Factors: HTN, HLD, pt age is 89Yrs F.  Clinical Indicators: H&P -Patient had a mechanical fall after tripping.    Ever since then patient has been experiencing pelvic pain and inability to   walk. XR Hip -IMPRESSION: Diffuse osteopenia evaluation. Possible left   superior pubic ramus fracture.  Treatment: Multiple Vitamins-Minerals, Orthopedic surgery consulted.  Options provided:  -- Pathological Acute left superior and inferior pubic ramus due to osteopenia   following fall which would not usually break a normal, healthy bone  -- Traumatic Acute left superior and inferior pubic ramus  fracture  -- Other - I will add my own diagnosis  -- Disagree - Not applicable / Not valid  -- Disagree - Clinically unable to determine / Unknown  -- Refer to Clinical Documentation Reviewer    PROVIDER RESPONSE TEXT:    This patient has a pathological Acute left superior and inferior pubic ramus   due to osteopenia following fall which would not usually break a normal,   healthy bone.    Query created by: WILDA JOLLY on 2024 12:21 PM      Electronically signed by:  Eder Jernigan MD 2024 5:28 PM

## 2024-08-07 NOTE — PROGRESS NOTES
Physician Progress Note      PATIENT:               REINIER DIETRICH  CSN #:                  [id number]  :                       10/21/1934  ADMIT DATE:       2024 2:54 PM  DISCH DATE:        2024 6:08 PM  RESPONDING  PROVIDER #:        Elba Singh MD          QUERY TEXT:    Patient admitted with Acute left superior and inferior pubic ramus fractures,   noted to have Paroxysmal atrial fibrillation and is maintained on Eliquis. If   possible, please document in progress notes and discharge summary if you are   evaluating and/or treating any of the following:?  ?  The medical record reflects the following:  Risk Factors: 89-year-old female, DM, HTN    Clinical Indicators: Cardiology PN  \"Paroxysmal atrial fibrillation,   currently in sinus rhythm/sinus bradycardia\".    Treatment: Eliquis, Cardiology consult    Thank you  MELBA Paz CDS  Options provided:  -- Secondary hypercoagulable state in a patient with atrial fibrillation  -- Other - I will add my own diagnosis  -- Disagree - Not applicable / Not valid  -- Disagree - Clinically unable to determine / Unknown  -- Refer to Clinical Documentation Reviewer    PROVIDER RESPONSE TEXT:    This patient has secondary hypercoagulable state in a patient with atrial   fibrillation.    Query created by: Luiz Chapman on 2024 12:32 AM      Electronically signed by:  Elba Singh MD 2024 11:40 PM

## 2024-11-26 NOTE — PROGRESS NOTES
Neurology Note    Patient ID:  Bebe Burnett  [id number]  89 y.o.  10/21/1934      Date of Consultation:  December 2, 2024    Referring Physician: Dr. Montanez    Reason for Consultation:  memory loss      Assessment:  The patient is a pleasant 89-year-old female with progressive memory concerns.  Her examination reveals a Akbar cognitive assessment score of 22 out of 30.  There is no focal sensory or motor deficits     Assessment & Plan  1.  Progressive memory loss:  This is most consistent with a dementia (Alzheimer's type versus vascular type)  Head CT from 2018 with mild chronic microvascular ischemic changes  I will order a brain MRI for further evaluation  TSH and vitamin B12 level will be checked to look for reversible causes  I will start the patient on Aricept 5 mg a day.  Depending the results of the MRI, may consider increasing or stopping the medication  Side effects and toxicity were discussed with the patient and her family  She will continue to work on vascular risk factor modification with her primary care doctor    I discussed with the patient and family members in regards to the patient's cognitive limitations and need to ensure patient safety.  Attempts to minimize opportunities of harm is important.   Activities to be monitored, or avoided, would include cooking, ironing clothes, financial bill payments.   Having structure to a day is important for the patient.  Cognitive and physical activities should be considered daily.       Subjective: memory       History of Present Illness:   Bebe Burnett is a 89 y.o. female who was referred to the neurology clinic at [facility] for an evaluation.  History of Present Illness   She is accompanied by her son and daughter who provide additional information during the history taking.    The patient feels that her memory issues are not beyond what she perceives as normal for her age. She has not been misplacing items around the

## 2024-12-02 ENCOUNTER — OFFICE VISIT (OUTPATIENT)
Age: 88
End: 2024-12-02
Payer: MEDICARE

## 2024-12-02 VITALS
SYSTOLIC BLOOD PRESSURE: 116 MMHG | DIASTOLIC BLOOD PRESSURE: 80 MMHG | HEART RATE: 65 BPM | BODY MASS INDEX: 19.66 KG/M2 | WEIGHT: 111 LBS

## 2024-12-02 DIAGNOSIS — R41.3 MEMORY LOSS: Primary | ICD-10-CM

## 2024-12-02 DIAGNOSIS — F03.A18 MILD DEMENTIA WITH OTHER BEHAVIORAL DISTURBANCE, UNSPECIFIED DEMENTIA TYPE (HCC): ICD-10-CM

## 2024-12-02 DIAGNOSIS — R41.3 MEMORY LOSS: ICD-10-CM

## 2024-12-02 PROCEDURE — 99205 OFFICE O/P NEW HI 60 MIN: CPT | Performed by: PSYCHIATRY & NEUROLOGY

## 2024-12-02 PROCEDURE — 1123F ACP DISCUSS/DSCN MKR DOCD: CPT | Performed by: PSYCHIATRY & NEUROLOGY

## 2024-12-02 PROCEDURE — 1160F RVW MEDS BY RX/DR IN RCRD: CPT | Performed by: PSYCHIATRY & NEUROLOGY

## 2024-12-02 PROCEDURE — 1159F MED LIST DOCD IN RCRD: CPT | Performed by: PSYCHIATRY & NEUROLOGY

## 2024-12-02 RX ORDER — DONEPEZIL HYDROCHLORIDE 5 MG/1
5 TABLET, FILM COATED ORAL NIGHTLY
Qty: 30 TABLET | Refills: 3 | Status: SHIPPED | OUTPATIENT
Start: 2024-12-02 | End: 2024-12-06

## 2024-12-02 ASSESSMENT — PATIENT HEALTH QUESTIONNAIRE - PHQ9
SUM OF ALL RESPONSES TO PHQ QUESTIONS 1-9: 0
SUM OF ALL RESPONSES TO PHQ QUESTIONS 1-9: 0
1. LITTLE INTEREST OR PLEASURE IN DOING THINGS: NOT AT ALL
2. FEELING DOWN, DEPRESSED OR HOPELESS: NOT AT ALL
SUM OF ALL RESPONSES TO PHQ QUESTIONS 1-9: 0
SUM OF ALL RESPONSES TO PHQ9 QUESTIONS 1 & 2: 0
SUM OF ALL RESPONSES TO PHQ QUESTIONS 1-9: 0

## 2024-12-02 ASSESSMENT — ANXIETY QUESTIONNAIRES
7. FEELING AFRAID AS IF SOMETHING AWFUL MIGHT HAPPEN: NOT AT ALL
6. BECOMING EASILY ANNOYED OR IRRITABLE: NOT AT ALL
4. TROUBLE RELAXING: NOT AT ALL
3. WORRYING TOO MUCH ABOUT DIFFERENT THINGS: NOT AT ALL
1. FEELING NERVOUS, ANXIOUS, OR ON EDGE: NOT AT ALL
5. BEING SO RESTLESS THAT IT IS HARD TO SIT STILL: NOT AT ALL
GAD7 TOTAL SCORE: 0
2. NOT BEING ABLE TO STOP OR CONTROL WORRYING: NOT AT ALL

## 2024-12-02 NOTE — PROGRESS NOTES
Daughter stated a couple years she had memory testing, said as time passed she has declined  Tested at facility , Care more in [place]

## 2024-12-03 ENCOUNTER — OFFICE VISIT (OUTPATIENT)
Age: 88
End: 2024-12-03
Payer: MEDICARE

## 2024-12-03 VITALS
HEIGHT: 63 IN | HEART RATE: 67 BPM | WEIGHT: 110 LBS | SYSTOLIC BLOOD PRESSURE: 110 MMHG | DIASTOLIC BLOOD PRESSURE: 80 MMHG | OXYGEN SATURATION: 97 % | BODY MASS INDEX: 19.49 KG/M2

## 2024-12-03 DIAGNOSIS — I35.1 NONRHEUMATIC AORTIC VALVE INSUFFICIENCY: ICD-10-CM

## 2024-12-03 DIAGNOSIS — I10 ESSENTIAL HYPERTENSION, BENIGN: ICD-10-CM

## 2024-12-03 DIAGNOSIS — I71.21 ANEURYSM OF ASCENDING AORTA WITHOUT RUPTURE (HCC): ICD-10-CM

## 2024-12-03 DIAGNOSIS — I48.0 PAROXYSMAL ATRIAL FIBRILLATION (HCC): Primary | ICD-10-CM

## 2024-12-03 DIAGNOSIS — E78.5 DYSLIPIDEMIA: ICD-10-CM

## 2024-12-03 PROCEDURE — 99214 OFFICE O/P EST MOD 30 MIN: CPT | Performed by: SPECIALIST

## 2024-12-03 PROCEDURE — 1123F ACP DISCUSS/DSCN MKR DOCD: CPT | Performed by: SPECIALIST

## 2024-12-03 PROCEDURE — 1159F MED LIST DOCD IN RCRD: CPT | Performed by: SPECIALIST

## 2024-12-03 PROCEDURE — 1126F AMNT PAIN NOTED NONE PRSNT: CPT | Performed by: SPECIALIST

## 2024-12-03 PROCEDURE — 1160F RVW MEDS BY RX/DR IN RCRD: CPT | Performed by: SPECIALIST

## 2024-12-03 RX ORDER — LISINOPRIL 5 MG/1
5 TABLET ORAL DAILY
COMMUNITY
Start: 2024-11-09

## 2024-12-03 RX ORDER — AMLODIPINE BESYLATE 5 MG/1
1 TABLET ORAL DAILY
COMMUNITY
Start: 2024-10-10

## 2024-12-03 RX ORDER — CLONIDINE HYDROCHLORIDE 0.1 MG/1
0.1 TABLET ORAL 2 TIMES DAILY
COMMUNITY

## 2024-12-03 ASSESSMENT — PATIENT HEALTH QUESTIONNAIRE - PHQ9
SUM OF ALL RESPONSES TO PHQ QUESTIONS 1-9: 0
2. FEELING DOWN, DEPRESSED OR HOPELESS: NOT AT ALL
SUM OF ALL RESPONSES TO PHQ QUESTIONS 1-9: 0
SUM OF ALL RESPONSES TO PHQ QUESTIONS 1-9: 0
SUM OF ALL RESPONSES TO PHQ9 QUESTIONS 1 & 2: 0
1. LITTLE INTEREST OR PLEASURE IN DOING THINGS: NOT AT ALL
SUM OF ALL RESPONSES TO PHQ QUESTIONS 1-9: 0

## 2024-12-03 NOTE — PROGRESS NOTES
1. Have you been to the ER, urgent care clinic since your last visit?  Hospitalized since your last visit?No    2. Have you seen or consulted any other health care providers outside of the [facility] since your last visit?  Include any pap smears or colon screening. No   
affected eye(s).      latanoprost (XALATAN) 0.005 % ophthalmic solution 1 drop daily Instill into affected eye(s)      Multiple Vitamins-Minerals (CENTRUM SILVER 50+MEN PO) Take by mouth      sertraline (ZOLOFT) 25 MG tablet Take 1 tablet by mouth nightly as needed      atorvastatin (LIPITOR) 40 MG tablet Take 1 tablet by mouth nightly      levothyroxine (SYNTHROID) 88 MCG tablet Take 1 tablet by mouth daily      ELIQUIS 5 MG TABS tablet Take 0.5 tablets by mouth 2 times daily       No current facility-administered medications for this visit.       ASSESSMENT & PLAN:      We had a long discussion about the risk of anticoagulation and her and it sounds like he favors stopping it as do I however he wants to talk to his mother and sibling some more before they make that decision.  In the meantime organ to prescribe the appropriate dose Eliquis so there is no doubt about what she is actually getting.  Overall she seems to be quite stable from a cardiac standpoint.  Will continue her amlodipine, clonidine, lisinopril and atorvastatin.  We will cross a copy of her recent primary care note and blood work.    Current treatment plan is effective,reviewed diet, exercise and weight control     1. Paroxysmal atrial fibrillation (HCC)  2. Essential hypertension, benign  3. Dyslipidemia  4. Aneurysm of ascending aorta without rupture (HCC)  5. Nonrheumatic aortic valve insufficiency       Future Appointments   Date Time Provider Department Center   6/2/2025  9:30 AM Agueda Prabhakar APRN - NP [id number]        Clifford Foley MD  12/3/2024  Please note that this dictation was completed with [employer], the [employer] voice recognition software.  Quite often unanticipated grammatical, syntax, homophones, and other interpretive errors are inadvertently transcribed by the computer software.  Please disregard these errors.  Please excuse any errors that have escaped final proofreading.  Thank you.

## 2024-12-04 LAB
TSH SERPL DL<=0.005 MIU/L-ACNC: 0.08 UIU/ML (ref 0.45–4.5)
VIT B12 SERPL-MCNC: 573 PG/ML (ref 232–1245)

## 2024-12-05 ENCOUNTER — TELEPHONE (OUTPATIENT)
Age: 88
End: 2024-12-05

## 2024-12-05 NOTE — TELEPHONE ENCOUNTER
----- Message from Dr. Lex Calvin DO sent at 12/4/2024  5:09 PM EST -----  Please let patient know that her tsh level was slightly low and she should follow up with her pcp. Thanks    ----- Message -----  From: Ela Fernando Incoming Amb Ref Lab Orders To [facility]  Sent: 12/4/2024   7:40 AM EST  To: Lex Calvin DO        Called pt and left voice message to call me back regarding lab results.       Faxed lab results to Dr.Charles Montanez office at [phone] and received fax confirmation.  Placed in fast scan.

## 2024-12-06 RX ORDER — DONEPEZIL HYDROCHLORIDE 5 MG/1
5 TABLET, FILM COATED ORAL NIGHTLY
Qty: 90 TABLET | Refills: 1 | Status: SHIPPED | OUTPATIENT
Start: 2024-12-06

## 2024-12-06 NOTE — TELEPHONE ENCOUNTER
Patient's son, Dagoberto (on phi), returned call. Advised him what Dr Calvin stated. Dagoberto verbalized understanding, thanked us and stated he will get with Pt's pcp to discuss next steps for her TSH levels.

## 2024-12-18 ENCOUNTER — HOSPITAL ENCOUNTER (OUTPATIENT)
Age: 88
Discharge: HOME | End: 2024-12-21
Payer: MEDICARE

## 2024-12-18 DIAGNOSIS — R41.3 MEMORY LOSS: ICD-10-CM

## 2024-12-18 PROCEDURE — 70551 MRI BRAIN STEM W/O DYE: CPT

## 2024-12-19 ENCOUNTER — TELEPHONE (OUTPATIENT)
Age: 88
End: 2024-12-19

## 2024-12-19 DIAGNOSIS — R93.0 ABNORMAL MRI OF HEAD: Primary | ICD-10-CM

## 2024-12-19 NOTE — TELEPHONE ENCOUNTER
----- Message from Dr. Lex Calvin DO sent at 12/19/2024  7:47 AM EST -----  Can you the patient and family know that the mri would be consisted with her dementia.   There was a incidental finding of a possible abnormality in her neck which was unclear.  It was recommended that she get a ct scan of her neck.  I have placed the order. Thanks.  ----- Message -----  From: Ela Fernando Incoming Orders Results To [facility]  Sent: 12/18/2024   8:59 PM EST  To: Lex Calvin DO        Called pt and left voice message to call me back regarding test result above.

## 2024-12-19 NOTE — TELEPHONE ENCOUNTER
Son Dagoberto on PHI returned call, relayed MRI result below to him. Gave him central scheduling # to call for scheduling of CT scan. He asked that I fax over the MRI result to pt's PCP. Advised I will do that. Dagoberto verbalized understanding and thanked me for the call.         Faxed MRI result to PCP-Dr.Charles Montanez at [phone] and received fax confirmation.  Placed in fast scan.

## 2024-12-19 NOTE — TELEPHONE ENCOUNTER
Patient son, Dagoberto, calling back to speak with nurse about results. Please call him at . Thank you.

## 2025-01-03 ENCOUNTER — HOSPITAL ENCOUNTER (OUTPATIENT)
Facility: HOSPITAL | Age: 89
Discharge: HOME | End: 2025-01-03
Attending: PSYCHIATRY & NEUROLOGY
Payer: MEDICARE

## 2025-01-03 DIAGNOSIS — R93.0 ABNORMAL MRI OF HEAD: ICD-10-CM

## 2025-01-03 LAB — CREAT BLD-MCNC: 0.7 MG/DL (ref 0.6–1.3)

## 2025-01-03 PROCEDURE — 82565 ASSAY OF CREATININE: CPT

## 2025-01-03 PROCEDURE — 70491 CT SOFT TISSUE NECK W/DYE: CPT

## 2025-01-03 PROCEDURE — 6360000004 HC RX CONTRAST MEDICATION: Performed by: PSYCHIATRY & NEUROLOGY

## 2025-01-03 RX ORDER — IOPAMIDOL 755 MG/ML
100 INJECTION, SOLUTION INTRAVASCULAR
Status: COMPLETED | OUTPATIENT
Start: 2025-01-03 | End: 2025-01-03

## 2025-01-03 RX ADMIN — IOPAMIDOL 100 ML: 755 INJECTION, SOLUTION INTRAVENOUS at 08:38

## 2025-01-14 ENCOUNTER — TELEPHONE (OUTPATIENT)
Age: 89
End: 2025-01-14

## 2025-01-14 NOTE — TELEPHONE ENCOUNTER
Patient son, Dagoberto is asking if we can give them the CT and MRI results and also send a copy to the PCP, Dr. Clifford Montanez. Dr. Roman phone number is .    He will like us to call him as well about those results. Please call him back at .

## 2025-01-15 DIAGNOSIS — F03.A18 MILD DEMENTIA WITH OTHER BEHAVIORAL DISTURBANCE, UNSPECIFIED DEMENTIA TYPE (HCC): Primary | ICD-10-CM

## 2025-01-15 NOTE — TELEPHONE ENCOUNTER
Relayed results to patients son,  MRI- Can you the patient and family know that the mri would be consisted with her dementia. There was a incidental finding of a possible abnormality in her neck which was unclear. It was recommended that she get a ct scan of her neck. I have placed the order. Thanks.   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  I spoke with Dr. Llamas, the neurointerventional physician, last week..  There would be no surgery or intervention recommended and she should take a baby aspirin daily.  This can be discussed in more detail at the follow up visit.     Patient asking if the imaging can be sent to her PCP, he would like the PCP to verify if the aspirin would be appropriate for her.  Relayed to patients son that this recommendation was made with her medication list being known but will fax the PCP both imaging reports    Patients son said she has declined in her cognition and is asking if there is any additional testing to look at her cognitive status. Relayed to patients son that the provider would need to see what testing would be most appropriate for her case

## 2025-01-16 NOTE — TELEPHONE ENCOUNTER
Grayson Tan MA  You16 hours ago (4:01 PM)     AA  Neurcog testing ordered     Grayson Tan MA  pcp, Dr. Montanez [phone] hours ago (3:52 PM)     AA  faxed MRI and CT scan with fax confirmation         Called son Dagoberto Altman on PHI, advised that Neurocognitive testing has been ordered by . advised someone will reach out in regards to scheduling her in about a week. If he does not hear anything in a week to call us back at main office number to schedule. He verbalized understanding.      Put Neuropsychology referral in mail to pt.

## 2025-05-21 RX ORDER — DONEPEZIL HYDROCHLORIDE 5 MG/1
5 TABLET, FILM COATED ORAL NIGHTLY
Qty: 90 TABLET | Refills: 1 | Status: SHIPPED | OUTPATIENT
Start: 2025-05-21

## 2025-07-18 ENCOUNTER — OFFICE VISIT (OUTPATIENT)
Age: 89
End: 2025-07-18
Payer: MEDICARE

## 2025-07-18 VITALS
RESPIRATION RATE: 18 BRPM | HEIGHT: 63 IN | DIASTOLIC BLOOD PRESSURE: 70 MMHG | HEART RATE: 62 BPM | OXYGEN SATURATION: 97 % | SYSTOLIC BLOOD PRESSURE: 136 MMHG | WEIGHT: 110 LBS | BODY MASS INDEX: 19.49 KG/M2

## 2025-07-18 DIAGNOSIS — F03.A0 MILD DEMENTIA WITHOUT BEHAVIORAL DISTURBANCE, PSYCHOTIC DISTURBANCE, MOOD DISTURBANCE, OR ANXIETY, UNSPECIFIED DEMENTIA TYPE (HCC): Primary | ICD-10-CM

## 2025-07-18 DIAGNOSIS — I72.0 CAROTID ANEURYSM, LEFT: ICD-10-CM

## 2025-07-18 PROCEDURE — 1160F RVW MEDS BY RX/DR IN RCRD: CPT | Performed by: NURSE PRACTITIONER

## 2025-07-18 PROCEDURE — 1123F ACP DISCUSS/DSCN MKR DOCD: CPT | Performed by: NURSE PRACTITIONER

## 2025-07-18 PROCEDURE — 1126F AMNT PAIN NOTED NONE PRSNT: CPT | Performed by: NURSE PRACTITIONER

## 2025-07-18 PROCEDURE — 99214 OFFICE O/P EST MOD 30 MIN: CPT | Performed by: NURSE PRACTITIONER

## 2025-07-18 PROCEDURE — 1159F MED LIST DOCD IN RCRD: CPT | Performed by: NURSE PRACTITIONER

## 2025-07-18 ASSESSMENT — PATIENT HEALTH QUESTIONNAIRE - PHQ9
1. LITTLE INTEREST OR PLEASURE IN DOING THINGS: NOT AT ALL
SUM OF ALL RESPONSES TO PHQ QUESTIONS 1-9: 0
1. LITTLE INTEREST OR PLEASURE IN DOING THINGS: NOT AT ALL
2. FEELING DOWN, DEPRESSED OR HOPELESS: NOT AT ALL
SUM OF ALL RESPONSES TO PHQ QUESTIONS 1-9: 0
2. FEELING DOWN, DEPRESSED OR HOPELESS: NOT AT ALL
SUM OF ALL RESPONSES TO PHQ QUESTIONS 1-9: 0

## 2025-07-18 NOTE — PROGRESS NOTES
[facility]  [address]  [address]  [place] [address]  Tel: [phone]  Fax: [phone]      Date:  25     Name:  REINIER DIETRICH  :  10/21/1934  MRN:  [id number]     PCP:  Clifford Montanez Jr., MD    Chief Complaint   Patient presents with    Memory Loss     Follow up -doing well        HISTORY OF PRESENT ILLNESS:  History of Present Illness  The patient is a 89-year-old female here today for a regular follow-up appointment. She was seen as a new patient with Dr. Lex Calvin in 2024 with concerns of progressive memory loss. She completed a MoCA assessment and scored 22 out of 30, leading to the initiation of Aricept 5 mg daily. Labs were ordered, and a brain MRI was completed, revealing an incidental finding of a cerebral aneurysm. This was evaluated by the neurointerventional provider, Dr. Llamas, who did not recommend surgical intervention but advised continued management, monitoring, and medication. Unfortunately, she experienced a fall on 2025 and was seen at [facility] where imaging was repeated, no changes noted to the CTA/aneurysm.. She is accompanied by her son.  He reports that his mother is actually doing well, he feels as though her overall demeanor is much more upbeat than previous months, he does feel as though there is something to do her memory decline, he appreciates our thoroughness and supports any other recommendations that we have to help maintain his mother's health.    She reports no significant issues with her memory and continues to enjoy cooking without any mishaps such as leaving things on or burning them. She has ceased driving and relies on her family for transportation. Her son assists her with medication management. She has been tolerating donepezil well without any side effects.    She occasionally experiences headaches but does not believe they are related to her aneurysm. She is not currently taking baby aspirin.    She

## 2025-07-18 NOTE — PROGRESS NOTES
Chief Complaint   Patient presents with    Memory Loss     Follow up -doing well      1. Have you been to the ER, urgent care clinic since your last visit?  Hospitalized since your last visit? Yes [facility] ER 7/6/25 after a fall     2. Have you seen or consulted any other health care providers outside of the [facility] since your last visit?  Include any pap smears or colon screening.  Yes see above

## (undated) DEVICE — CLIPPING DEVICE: Brand: RESOLUTION CLIP

## (undated) DEVICE — SOLIDIFIER MEDC 1200ML -- CONVERT TO 356117

## (undated) DEVICE — SET ADMIN 16ML TBNG L100IN 2 Y INJ SITE IV PIGGY BK DISP

## (undated) DEVICE — NEEDLE SCLERO 25GA L240CM OD0.51MM ID0.24MM EXTN L4MM SHTH

## (undated) DEVICE — STAIN INDIA INK IN NACL 10ML --

## (undated) DEVICE — BLOCK BITE ENDOSCP AD 21 MM W/ DIL BLU LF DISP

## (undated) DEVICE — Z DISCONTINUED PER MEDLINE LINE GAS SAMPLING O2/CO2 LNG AD 13 FT NSL W/ TBNG FILTERLINE

## (undated) DEVICE — TOWEL 4 PLY TISS 19X30 SUE WHT

## (undated) DEVICE — 1200 GUARD II KIT W/5MM TUBE W/O VAC TUBE: Brand: GUARDIAN

## (undated) DEVICE — Device: Brand: MEDEX

## (undated) DEVICE — NEEDLE HYPO 18GA L1.5IN PNK S STL HUB POLYPR SHLD REG BVL

## (undated) DEVICE — KENDALL RADIOLUCENT FOAM MONITORING ELECTRODE RECTANGULAR SHAPE: Brand: KENDALL

## (undated) DEVICE — BASIN EMESIS 500CC ROSE 250/CS 60/PLT: Brand: MEDEGEN MEDICAL PRODUCTS, LLC

## (undated) DEVICE — (D)SYR 10ML 1/5ML GRAD NSAF -- PKGING CHANGE USE ITEM 338027

## (undated) DEVICE — Device

## (undated) DEVICE — SYR 3ML LL TIP 1/10ML GRAD --

## (undated) DEVICE — MEDI-VAC YANK SUCT HNDL W/TPRD BULBOUS TIP: Brand: CARDINAL HEALTH

## (undated) DEVICE — CLIP INT L235CM WRK CHAN DIA2.8MM OPN 11MM LCK MECHANISM MR